# Patient Record
Sex: FEMALE | Race: WHITE | Employment: UNEMPLOYED | URBAN - METROPOLITAN AREA
[De-identification: names, ages, dates, MRNs, and addresses within clinical notes are randomized per-mention and may not be internally consistent; named-entity substitution may affect disease eponyms.]

---

## 2022-10-14 PROBLEM — E66.01 MORBID OBESITY (HCC): Status: ACTIVE | Noted: 2022-10-14

## 2022-10-14 PROBLEM — E11.9 DM (DIABETES MELLITUS) (HCC): Status: ACTIVE | Noted: 2022-10-14

## 2022-10-27 ENCOUNTER — HOSPITAL ENCOUNTER (INPATIENT)
Age: 52
LOS: 1 days | Discharge: HOME | DRG: 951 | End: 2022-10-29
Attending: EMERGENCY MEDICINE | Admitting: INTERNAL MEDICINE
Payer: MEDICAID

## 2022-10-27 DIAGNOSIS — E66.01 CLASS 3 SEVERE OBESITY WITH SERIOUS COMORBIDITY AND BODY MASS INDEX (BMI) OF 40.0 TO 44.9 IN ADULT, UNSPECIFIED OBESITY TYPE (HCC): ICD-10-CM

## 2022-10-27 DIAGNOSIS — L73.2 HIDRADENITIS SUPPURATIVA: Primary | ICD-10-CM

## 2022-10-27 DIAGNOSIS — L02.91 ABSCESS: ICD-10-CM

## 2022-10-27 DIAGNOSIS — L02.214 GROIN ABSCESS: ICD-10-CM

## 2022-10-27 DIAGNOSIS — E11.649 UNCONTROLLED TYPE 2 DIABETES MELLITUS WITH HYPOGLYCEMIA WITHOUT COMA (HCC): ICD-10-CM

## 2022-10-27 PROCEDURE — 99285 EMERGENCY DEPT VISIT HI MDM: CPT

## 2022-10-27 ASSESSMENT — PAIN DESCRIPTION - PAIN TYPE: TYPE: ACUTE PAIN

## 2022-10-27 ASSESSMENT — PAIN - FUNCTIONAL ASSESSMENT: PAIN_FUNCTIONAL_ASSESSMENT: 0-10

## 2022-10-27 ASSESSMENT — PAIN DESCRIPTION - FREQUENCY: FREQUENCY: CONTINUOUS

## 2022-10-27 ASSESSMENT — PAIN SCALES - GENERAL: PAINLEVEL_OUTOF10: 9

## 2022-10-27 ASSESSMENT — PAIN DESCRIPTION - DESCRIPTORS: DESCRIPTORS: SORE

## 2022-10-28 ENCOUNTER — ANESTHESIA EVENT (OUTPATIENT)
Dept: OPERATING ROOM | Age: 52
DRG: 951 | End: 2022-10-28
Payer: MEDICAID

## 2022-10-28 ENCOUNTER — APPOINTMENT (OUTPATIENT)
Dept: GENERAL RADIOLOGY | Age: 52
DRG: 951 | End: 2022-10-28
Payer: MEDICAID

## 2022-10-28 ENCOUNTER — ANESTHESIA (OUTPATIENT)
Dept: OPERATING ROOM | Age: 52
DRG: 951 | End: 2022-10-28
Payer: MEDICAID

## 2022-10-28 ENCOUNTER — APPOINTMENT (OUTPATIENT)
Dept: CT IMAGING | Age: 52
DRG: 951 | End: 2022-10-28
Payer: MEDICAID

## 2022-10-28 PROBLEM — D72.9 NEUTROPHILIC LEUKOCYTOSIS: Status: ACTIVE | Noted: 2022-10-28

## 2022-10-28 PROBLEM — L73.2 HIDRADENITIS SUPPURATIVA: Status: ACTIVE | Noted: 2022-10-28

## 2022-10-28 LAB
A/G RATIO: 1 (ref 1.1–2.2)
ALBUMIN SERPL-MCNC: 4 G/DL (ref 3.4–5)
ALP BLD-CCNC: 126 U/L (ref 40–129)
ALT SERPL-CCNC: 13 U/L (ref 10–40)
ANION GAP SERPL CALCULATED.3IONS-SCNC: 12 MMOL/L (ref 3–16)
AST SERPL-CCNC: 9 U/L (ref 15–37)
BASE EXCESS VENOUS: -0.5 MMOL/L (ref -3–3)
BASOPHILS ABSOLUTE: 0.1 K/UL (ref 0–0.2)
BASOPHILS RELATIVE PERCENT: 0.4 %
BETA-HYDROXYBUTYRATE: 0.08 MMOL/L (ref 0–0.27)
BILIRUB SERPL-MCNC: 0.4 MG/DL (ref 0–1)
BILIRUBIN URINE: NEGATIVE
BLOOD, URINE: NEGATIVE
BUN BLDV-MCNC: 10 MG/DL (ref 7–20)
CALCIUM SERPL-MCNC: 9.3 MG/DL (ref 8.3–10.6)
CARBOXYHEMOGLOBIN: 1.9 % (ref 0–1.5)
CHLORIDE BLD-SCNC: 97 MMOL/L (ref 99–110)
CLARITY: CLEAR
CO2: 23 MMOL/L (ref 21–32)
COLOR: YELLOW
CREAT SERPL-MCNC: 0.7 MG/DL (ref 0.6–1.1)
EKG ATRIAL RATE: 111 BPM
EKG DIAGNOSIS: NORMAL
EKG P AXIS: 50 DEGREES
EKG P-R INTERVAL: 128 MS
EKG Q-T INTERVAL: 334 MS
EKG QRS DURATION: 78 MS
EKG QTC CALCULATION (BAZETT): 454 MS
EKG R AXIS: -51 DEGREES
EKG T AXIS: 57 DEGREES
EKG VENTRICULAR RATE: 111 BPM
EOSINOPHILS ABSOLUTE: 0.1 K/UL (ref 0–0.6)
EOSINOPHILS RELATIVE PERCENT: 0.4 %
GFR SERPL CREATININE-BSD FRML MDRD: >60 ML/MIN/{1.73_M2}
GLUCOSE BLD-MCNC: 174 MG/DL (ref 70–99)
GLUCOSE BLD-MCNC: 241 MG/DL (ref 70–99)
GLUCOSE BLD-MCNC: 253 MG/DL (ref 70–99)
GLUCOSE BLD-MCNC: 265 MG/DL (ref 70–99)
GLUCOSE BLD-MCNC: 338 MG/DL (ref 70–99)
GLUCOSE URINE: >=1000 MG/DL
HCG(URINE) PREGNANCY TEST: NEGATIVE
HCO3 VENOUS: 23.7 MMOL/L (ref 23–29)
HCT VFR BLD CALC: 40.3 % (ref 36–48)
HEMOGLOBIN: 13.3 G/DL (ref 12–16)
KETONES, URINE: NEGATIVE MG/DL
LACTIC ACID: 1.7 MMOL/L (ref 0.4–2)
LEUKOCYTE ESTERASE, URINE: NEGATIVE
LIPASE: 25 U/L (ref 13–60)
LYMPHOCYTES ABSOLUTE: 2.4 K/UL (ref 1–5.1)
LYMPHOCYTES RELATIVE PERCENT: 18 %
MCH RBC QN AUTO: 26.5 PG (ref 26–34)
MCHC RBC AUTO-ENTMCNC: 33 G/DL (ref 31–36)
MCV RBC AUTO: 80.1 FL (ref 80–100)
METHEMOGLOBIN VENOUS: 0.3 %
MICROSCOPIC EXAMINATION: ABNORMAL
MONOCYTES ABSOLUTE: 1 K/UL (ref 0–1.3)
MONOCYTES RELATIVE PERCENT: 7.2 %
NEUTROPHILS ABSOLUTE: 9.8 K/UL (ref 1.7–7.7)
NEUTROPHILS RELATIVE PERCENT: 74 %
NITRITE, URINE: NEGATIVE
O2 SAT, VEN: 78 %
O2 THERAPY: ABNORMAL
PCO2, VEN: 37.5 MMHG (ref 40–50)
PDW BLD-RTO: 15 % (ref 12.4–15.4)
PERFORMED ON: ABNORMAL
PH UA: 6 (ref 5–8)
PH VENOUS: 7.42 (ref 7.35–7.45)
PLATELET # BLD: 387 K/UL (ref 135–450)
PMV BLD AUTO: 7.4 FL (ref 5–10.5)
PO2, VEN: 41.6 MMHG (ref 25–40)
POTASSIUM REFLEX MAGNESIUM: 3.8 MMOL/L (ref 3.5–5.1)
PROCALCITONIN: 0.07 NG/ML (ref 0–0.15)
PROTEIN UA: NEGATIVE MG/DL
RBC # BLD: 5.03 M/UL (ref 4–5.2)
SARS-COV-2, NAAT: NOT DETECTED
SODIUM BLD-SCNC: 132 MMOL/L (ref 136–145)
SPECIFIC GRAVITY UA: 1.01 (ref 1–1.03)
TCO2 CALC VENOUS: 25 MMOL/L
TOTAL PROTEIN: 7.9 G/DL (ref 6.4–8.2)
TROPONIN: <0.01 NG/ML
URINE REFLEX TO CULTURE: ABNORMAL
URINE TYPE: ABNORMAL
UROBILINOGEN, URINE: 0.2 E.U./DL
WBC # BLD: 13.3 K/UL (ref 4–11)

## 2022-10-28 PROCEDURE — 87076 CULTURE ANAEROBE IDENT EACH: CPT

## 2022-10-28 PROCEDURE — 6370000000 HC RX 637 (ALT 250 FOR IP): Performed by: INTERNAL MEDICINE

## 2022-10-28 PROCEDURE — 87040 BLOOD CULTURE FOR BACTERIA: CPT

## 2022-10-28 PROCEDURE — 70450 CT HEAD/BRAIN W/O DYE: CPT

## 2022-10-28 PROCEDURE — 87077 CULTURE AEROBIC IDENTIFY: CPT

## 2022-10-28 PROCEDURE — 6360000002 HC RX W HCPCS: Performed by: INTERNAL MEDICINE

## 2022-10-28 PROCEDURE — 1200000000 HC SEMI PRIVATE

## 2022-10-28 PROCEDURE — 87635 SARS-COV-2 COVID-19 AMP PRB: CPT

## 2022-10-28 PROCEDURE — 96365 THER/PROPH/DIAG IV INF INIT: CPT

## 2022-10-28 PROCEDURE — 2580000003 HC RX 258: Performed by: SURGERY

## 2022-10-28 PROCEDURE — 56405 I&D VULVA/PERINEAL ABSCESS: CPT | Performed by: SURGERY

## 2022-10-28 PROCEDURE — 2580000003 HC RX 258: Performed by: INTERNAL MEDICINE

## 2022-10-28 PROCEDURE — 3700000000 HC ANESTHESIA ATTENDED CARE: Performed by: SURGERY

## 2022-10-28 PROCEDURE — 87075 CULTR BACTERIA EXCEPT BLOOD: CPT

## 2022-10-28 PROCEDURE — 7100000000 HC PACU RECOVERY - FIRST 15 MIN: Performed by: SURGERY

## 2022-10-28 PROCEDURE — 84703 CHORIONIC GONADOTROPIN ASSAY: CPT

## 2022-10-28 PROCEDURE — 74176 CT ABD & PELVIS W/O CONTRAST: CPT

## 2022-10-28 PROCEDURE — 80053 COMPREHEN METABOLIC PANEL: CPT

## 2022-10-28 PROCEDURE — 2580000003 HC RX 258: Performed by: NURSE ANESTHETIST, CERTIFIED REGISTERED

## 2022-10-28 PROCEDURE — 87015 SPECIMEN INFECT AGNT CONCNTJ: CPT

## 2022-10-28 PROCEDURE — 2500000003 HC RX 250 WO HCPCS: Performed by: NURSE ANESTHETIST, CERTIFIED REGISTERED

## 2022-10-28 PROCEDURE — 96366 THER/PROPH/DIAG IV INF ADDON: CPT

## 2022-10-28 PROCEDURE — 2580000003 HC RX 258: Performed by: PHYSICIAN ASSISTANT

## 2022-10-28 PROCEDURE — 99223 1ST HOSP IP/OBS HIGH 75: CPT | Performed by: SURGERY

## 2022-10-28 PROCEDURE — 85025 COMPLETE CBC W/AUTO DIFF WBC: CPT

## 2022-10-28 PROCEDURE — 0J9M0ZZ DRAINAGE OF LEFT UPPER LEG SUBCUTANEOUS TISSUE AND FASCIA, OPEN APPROACH: ICD-10-PCS | Performed by: SURGERY

## 2022-10-28 PROCEDURE — A4217 STERILE WATER/SALINE, 500 ML: HCPCS | Performed by: SURGERY

## 2022-10-28 PROCEDURE — 3700000001 HC ADD 15 MINUTES (ANESTHESIA): Performed by: SURGERY

## 2022-10-28 PROCEDURE — 87205 SMEAR GRAM STAIN: CPT

## 2022-10-28 PROCEDURE — 71045 X-RAY EXAM CHEST 1 VIEW: CPT

## 2022-10-28 PROCEDURE — 84145 PROCALCITONIN (PCT): CPT

## 2022-10-28 PROCEDURE — 82803 BLOOD GASES ANY COMBINATION: CPT

## 2022-10-28 PROCEDURE — 81003 URINALYSIS AUTO W/O SCOPE: CPT

## 2022-10-28 PROCEDURE — 2500000003 HC RX 250 WO HCPCS: Performed by: INTERNAL MEDICINE

## 2022-10-28 PROCEDURE — 93005 ELECTROCARDIOGRAM TRACING: CPT | Performed by: PHYSICIAN ASSISTANT

## 2022-10-28 PROCEDURE — 87070 CULTURE OTHR SPECIMN AEROBIC: CPT

## 2022-10-28 PROCEDURE — 87102 FUNGUS ISOLATION CULTURE: CPT

## 2022-10-28 PROCEDURE — 3600000012 HC SURGERY LEVEL 2 ADDTL 15MIN: Performed by: SURGERY

## 2022-10-28 PROCEDURE — 6360000002 HC RX W HCPCS: Performed by: ANESTHESIOLOGY

## 2022-10-28 PROCEDURE — 2709999900 HC NON-CHARGEABLE SUPPLY: Performed by: SURGERY

## 2022-10-28 PROCEDURE — 87206 SMEAR FLUORESCENT/ACID STAI: CPT

## 2022-10-28 PROCEDURE — 83690 ASSAY OF LIPASE: CPT

## 2022-10-28 PROCEDURE — 2500000003 HC RX 250 WO HCPCS: Performed by: PHYSICIAN ASSISTANT

## 2022-10-28 PROCEDURE — 87116 MYCOBACTERIA CULTURE: CPT

## 2022-10-28 PROCEDURE — 83605 ASSAY OF LACTIC ACID: CPT

## 2022-10-28 PROCEDURE — 6360000002 HC RX W HCPCS: Performed by: NURSE ANESTHETIST, CERTIFIED REGISTERED

## 2022-10-28 PROCEDURE — 93010 ELECTROCARDIOGRAM REPORT: CPT | Performed by: INTERNAL MEDICINE

## 2022-10-28 PROCEDURE — 3600000002 HC SURGERY LEVEL 2 BASE: Performed by: SURGERY

## 2022-10-28 PROCEDURE — 82010 KETONE BODYS QUAN: CPT

## 2022-10-28 PROCEDURE — 84484 ASSAY OF TROPONIN QUANT: CPT

## 2022-10-28 PROCEDURE — 99221 1ST HOSP IP/OBS SF/LOW 40: CPT | Performed by: INTERNAL MEDICINE

## 2022-10-28 PROCEDURE — 7100000001 HC PACU RECOVERY - ADDTL 15 MIN: Performed by: SURGERY

## 2022-10-28 RX ORDER — SODIUM CHLORIDE 9 MG/ML
25 INJECTION, SOLUTION INTRAVENOUS PRN
Status: DISCONTINUED | OUTPATIENT
Start: 2022-10-28 | End: 2022-10-28 | Stop reason: HOSPADM

## 2022-10-28 RX ORDER — ATORVASTATIN CALCIUM 10 MG/1
10 TABLET, FILM COATED ORAL NIGHTLY
Status: DISCONTINUED | OUTPATIENT
Start: 2022-10-28 | End: 2022-10-29 | Stop reason: HOSPADM

## 2022-10-28 RX ORDER — SODIUM CHLORIDE, SODIUM LACTATE, POTASSIUM CHLORIDE, CALCIUM CHLORIDE 600; 310; 30; 20 MG/100ML; MG/100ML; MG/100ML; MG/100ML
INJECTION, SOLUTION INTRAVENOUS CONTINUOUS PRN
Status: DISCONTINUED | OUTPATIENT
Start: 2022-10-28 | End: 2022-10-30 | Stop reason: SDUPTHER

## 2022-10-28 RX ORDER — OXYCODONE HYDROCHLORIDE 5 MG/1
5 TABLET ORAL PRN
Status: DISCONTINUED | OUTPATIENT
Start: 2022-10-28 | End: 2022-10-28 | Stop reason: HOSPADM

## 2022-10-28 RX ORDER — ACETAMINOPHEN 650 MG/1
650 SUPPOSITORY RECTAL EVERY 4 HOURS PRN
Status: DISCONTINUED | OUTPATIENT
Start: 2022-10-28 | End: 2022-10-29 | Stop reason: HOSPADM

## 2022-10-28 RX ORDER — ONDANSETRON 2 MG/ML
INJECTION INTRAMUSCULAR; INTRAVENOUS PRN
Status: DISCONTINUED | OUTPATIENT
Start: 2022-10-28 | End: 2022-10-30 | Stop reason: SDUPTHER

## 2022-10-28 RX ORDER — SODIUM CHLORIDE 0.9 % (FLUSH) 0.9 %
5-40 SYRINGE (ML) INJECTION PRN
Status: DISCONTINUED | OUTPATIENT
Start: 2022-10-28 | End: 2022-10-28 | Stop reason: HOSPADM

## 2022-10-28 RX ORDER — SODIUM CHLORIDE 9 MG/ML
INJECTION, SOLUTION INTRAVENOUS PRN
Status: DISCONTINUED | OUTPATIENT
Start: 2022-10-28 | End: 2022-10-29 | Stop reason: HOSPADM

## 2022-10-28 RX ORDER — FENTANYL CITRATE 50 UG/ML
INJECTION, SOLUTION INTRAMUSCULAR; INTRAVENOUS PRN
Status: DISCONTINUED | OUTPATIENT
Start: 2022-10-28 | End: 2022-10-30 | Stop reason: SDUPTHER

## 2022-10-28 RX ORDER — SODIUM CHLORIDE 0.9 % (FLUSH) 0.9 %
5-40 SYRINGE (ML) INJECTION EVERY 12 HOURS SCHEDULED
Status: DISCONTINUED | OUTPATIENT
Start: 2022-10-28 | End: 2022-10-28 | Stop reason: HOSPADM

## 2022-10-28 RX ORDER — ONDANSETRON 2 MG/ML
4 INJECTION INTRAMUSCULAR; INTRAVENOUS
Status: DISCONTINUED | OUTPATIENT
Start: 2022-10-28 | End: 2022-10-28 | Stop reason: HOSPADM

## 2022-10-28 RX ORDER — MEPERIDINE HYDROCHLORIDE 50 MG/ML
12.5 INJECTION INTRAMUSCULAR; INTRAVENOUS; SUBCUTANEOUS EVERY 5 MIN PRN
Status: DISCONTINUED | OUTPATIENT
Start: 2022-10-28 | End: 2022-10-28 | Stop reason: HOSPADM

## 2022-10-28 RX ORDER — PROPOFOL 10 MG/ML
INJECTION, EMULSION INTRAVENOUS PRN
Status: DISCONTINUED | OUTPATIENT
Start: 2022-10-28 | End: 2022-10-30 | Stop reason: SDUPTHER

## 2022-10-28 RX ORDER — SODIUM CHLORIDE, SODIUM LACTATE, POTASSIUM CHLORIDE, AND CALCIUM CHLORIDE .6; .31; .03; .02 G/100ML; G/100ML; G/100ML; G/100ML
30 INJECTION, SOLUTION INTRAVENOUS ONCE
Status: COMPLETED | OUTPATIENT
Start: 2022-10-28 | End: 2022-10-28

## 2022-10-28 RX ORDER — CLINDAMYCIN PHOSPHATE 600 MG/50ML
600 INJECTION INTRAVENOUS EVERY 6 HOURS
Status: DISCONTINUED | OUTPATIENT
Start: 2022-10-28 | End: 2022-10-28

## 2022-10-28 RX ORDER — ACETAMINOPHEN 325 MG/1
650 TABLET ORAL EVERY 4 HOURS PRN
Status: DISCONTINUED | OUTPATIENT
Start: 2022-10-28 | End: 2022-10-29 | Stop reason: HOSPADM

## 2022-10-28 RX ORDER — ASPIRIN 81 MG/1
81 TABLET ORAL DAILY
Status: DISCONTINUED | OUTPATIENT
Start: 2022-10-28 | End: 2022-10-29 | Stop reason: HOSPADM

## 2022-10-28 RX ORDER — CEFTRIAXONE 1 G/1
INJECTION, POWDER, FOR SOLUTION INTRAMUSCULAR; INTRAVENOUS PRN
Status: DISCONTINUED | OUTPATIENT
Start: 2022-10-28 | End: 2022-10-30 | Stop reason: SDUPTHER

## 2022-10-28 RX ORDER — LABETALOL HYDROCHLORIDE 5 MG/ML
10 INJECTION, SOLUTION INTRAVENOUS
Status: DISCONTINUED | OUTPATIENT
Start: 2022-10-28 | End: 2022-10-28 | Stop reason: HOSPADM

## 2022-10-28 RX ORDER — POLYETHYLENE GLYCOL 3350 17 G/17G
17 POWDER, FOR SOLUTION ORAL DAILY PRN
Status: DISCONTINUED | OUTPATIENT
Start: 2022-10-28 | End: 2022-10-29 | Stop reason: HOSPADM

## 2022-10-28 RX ORDER — CLINDAMYCIN PHOSPHATE 600 MG/50ML
600 INJECTION INTRAVENOUS ONCE
Status: COMPLETED | OUTPATIENT
Start: 2022-10-28 | End: 2022-10-28

## 2022-10-28 RX ORDER — SODIUM CHLORIDE 0.9 % (FLUSH) 0.9 %
10 SYRINGE (ML) INJECTION PRN
Status: DISCONTINUED | OUTPATIENT
Start: 2022-10-28 | End: 2022-10-29 | Stop reason: HOSPADM

## 2022-10-28 RX ORDER — DIPHENHYDRAMINE HYDROCHLORIDE 50 MG/ML
12.5 INJECTION INTRAMUSCULAR; INTRAVENOUS
Status: DISCONTINUED | OUTPATIENT
Start: 2022-10-28 | End: 2022-10-28 | Stop reason: HOSPADM

## 2022-10-28 RX ORDER — LIDOCAINE HYDROCHLORIDE 20 MG/ML
INJECTION, SOLUTION INFILTRATION; PERINEURAL PRN
Status: DISCONTINUED | OUTPATIENT
Start: 2022-10-28 | End: 2022-10-30 | Stop reason: SDUPTHER

## 2022-10-28 RX ORDER — SODIUM CHLORIDE 0.9 % (FLUSH) 0.9 %
10 SYRINGE (ML) INJECTION EVERY 12 HOURS SCHEDULED
Status: DISCONTINUED | OUTPATIENT
Start: 2022-10-28 | End: 2022-10-29 | Stop reason: HOSPADM

## 2022-10-28 RX ORDER — INSULIN LISPRO 100 [IU]/ML
0-8 INJECTION, SOLUTION INTRAVENOUS; SUBCUTANEOUS
Status: DISCONTINUED | OUTPATIENT
Start: 2022-10-28 | End: 2022-10-29 | Stop reason: HOSPADM

## 2022-10-28 RX ORDER — ONDANSETRON 2 MG/ML
4 INJECTION INTRAMUSCULAR; INTRAVENOUS EVERY 4 HOURS PRN
Status: DISCONTINUED | OUTPATIENT
Start: 2022-10-28 | End: 2022-10-29 | Stop reason: HOSPADM

## 2022-10-28 RX ORDER — ENOXAPARIN SODIUM 100 MG/ML
40 INJECTION SUBCUTANEOUS DAILY
Status: DISCONTINUED | OUTPATIENT
Start: 2022-10-28 | End: 2022-10-29 | Stop reason: HOSPADM

## 2022-10-28 RX ORDER — MAGNESIUM HYDROXIDE 1200 MG/15ML
LIQUID ORAL CONTINUOUS PRN
Status: DISCONTINUED | OUTPATIENT
Start: 2022-10-28 | End: 2022-10-28 | Stop reason: HOSPADM

## 2022-10-28 RX ORDER — DEXTROSE MONOHYDRATE 100 MG/ML
INJECTION, SOLUTION INTRAVENOUS CONTINUOUS PRN
Status: DISCONTINUED | OUTPATIENT
Start: 2022-10-28 | End: 2022-10-29 | Stop reason: HOSPADM

## 2022-10-28 RX ORDER — INSULIN LISPRO 100 [IU]/ML
0-4 INJECTION, SOLUTION INTRAVENOUS; SUBCUTANEOUS NIGHTLY
Status: DISCONTINUED | OUTPATIENT
Start: 2022-10-28 | End: 2022-10-29 | Stop reason: HOSPADM

## 2022-10-28 RX ORDER — INSULIN GLARGINE 100 [IU]/ML
15 INJECTION, SOLUTION SUBCUTANEOUS 2 TIMES DAILY
Status: DISCONTINUED | OUTPATIENT
Start: 2022-10-28 | End: 2022-10-29 | Stop reason: HOSPADM

## 2022-10-28 RX ORDER — OXYCODONE HYDROCHLORIDE 5 MG/1
10 TABLET ORAL PRN
Status: DISCONTINUED | OUTPATIENT
Start: 2022-10-28 | End: 2022-10-28 | Stop reason: HOSPADM

## 2022-10-28 RX ADMIN — INSULIN GLARGINE 15 UNITS: 100 INJECTION, SOLUTION SUBCUTANEOUS at 08:56

## 2022-10-28 RX ADMIN — CLINDAMYCIN PHOSPHATE 600 MG: 600 INJECTION, SOLUTION INTRAVENOUS at 13:33

## 2022-10-28 RX ADMIN — CEFTRIAXONE SODIUM 2 G: 1 INJECTION, POWDER, FOR SOLUTION INTRAMUSCULAR; INTRAVENOUS at 16:49

## 2022-10-28 RX ADMIN — PROPOFOL 200 MG: 10 INJECTION, EMULSION INTRAVENOUS at 16:40

## 2022-10-28 RX ADMIN — PHENYLEPHRINE HYDROCHLORIDE 50 MCG: 10 INJECTION INTRAVENOUS at 16:50

## 2022-10-28 RX ADMIN — INSULIN LISPRO 4 UNITS: 100 INJECTION, SOLUTION INTRAVENOUS; SUBCUTANEOUS at 08:55

## 2022-10-28 RX ADMIN — MICONAZOLE NITRATE: 2 POWDER TOPICAL at 21:51

## 2022-10-28 RX ADMIN — CLINDAMYCIN PHOSPHATE 600 MG: 600 INJECTION, SOLUTION INTRAVENOUS at 08:54

## 2022-10-28 RX ADMIN — ACETAMINOPHEN 650 MG: 325 TABLET ORAL at 21:42

## 2022-10-28 RX ADMIN — PHENYLEPHRINE HYDROCHLORIDE 50 MCG: 10 INJECTION INTRAVENOUS at 16:47

## 2022-10-28 RX ADMIN — LIDOCAINE HYDROCHLORIDE 60 MG: 20 INJECTION, SOLUTION INFILTRATION; PERINEURAL at 16:40

## 2022-10-28 RX ADMIN — VANCOMYCIN HYDROCHLORIDE 1750 MG: 1 INJECTION, POWDER, LYOPHILIZED, FOR SOLUTION INTRAVENOUS at 16:45

## 2022-10-28 RX ADMIN — DEXTROSE MONOHYDRATE 2000 MG: 5 INJECTION INTRAVENOUS at 15:00

## 2022-10-28 RX ADMIN — Medication 10 ML: at 08:51

## 2022-10-28 RX ADMIN — SODIUM CHLORIDE, SODIUM LACTATE, POTASSIUM CHLORIDE, AND CALCIUM CHLORIDE: .6; .31; .03; .02 INJECTION, SOLUTION INTRAVENOUS at 16:27

## 2022-10-28 RX ADMIN — ONDANSETRON 4 MG: 2 INJECTION INTRAMUSCULAR; INTRAVENOUS at 16:50

## 2022-10-28 RX ADMIN — ATORVASTATIN CALCIUM 10 MG: 10 TABLET, FILM COATED ORAL at 21:42

## 2022-10-28 RX ADMIN — ASPIRIN 81 MG: 81 TABLET, COATED ORAL at 08:48

## 2022-10-28 RX ADMIN — Medication 10 ML: at 21:51

## 2022-10-28 RX ADMIN — SODIUM CHLORIDE, POTASSIUM CHLORIDE, SODIUM LACTATE AND CALCIUM CHLORIDE 1434 ML: 600; 310; 30; 20 INJECTION, SOLUTION INTRAVENOUS at 01:49

## 2022-10-28 RX ADMIN — INSULIN LISPRO 2 UNITS: 100 INJECTION, SOLUTION INTRAVENOUS; SUBCUTANEOUS at 18:59

## 2022-10-28 RX ADMIN — PHENYLEPHRINE HYDROCHLORIDE 50 MCG: 10 INJECTION INTRAVENOUS at 16:52

## 2022-10-28 RX ADMIN — INSULIN GLARGINE 15 UNITS: 100 INJECTION, SOLUTION SUBCUTANEOUS at 21:52

## 2022-10-28 RX ADMIN — Medication 10 ML: at 13:24

## 2022-10-28 RX ADMIN — MICONAZOLE NITRATE: 2 POWDER TOPICAL at 08:55

## 2022-10-28 RX ADMIN — CLINDAMYCIN PHOSPHATE 600 MG: 600 INJECTION, SOLUTION INTRAVENOUS at 01:49

## 2022-10-28 RX ADMIN — ENOXAPARIN SODIUM 40 MG: 100 INJECTION SUBCUTANEOUS at 08:49

## 2022-10-28 RX ADMIN — INSULIN LISPRO 4 UNITS: 100 INJECTION, SOLUTION INTRAVENOUS; SUBCUTANEOUS at 11:38

## 2022-10-28 RX ADMIN — FENTANYL CITRATE 50 MCG: 50 INJECTION INTRAMUSCULAR; INTRAVENOUS at 16:40

## 2022-10-28 RX ADMIN — ACETAMINOPHEN 650 MG: 325 TABLET ORAL at 08:48

## 2022-10-28 RX ADMIN — FENTANYL CITRATE 50 MCG: 50 INJECTION INTRAMUSCULAR; INTRAVENOUS at 16:47

## 2022-10-28 ASSESSMENT — ENCOUNTER SYMPTOMS
NAUSEA: 1
EYE REDNESS: 0
RHINORRHEA: 0
EYE DISCHARGE: 0
SINUS PRESSURE: 0
DIARRHEA: 0
CHEST TIGHTNESS: 0
SORE THROAT: 0
COUGH: 0
SHORTNESS OF BREATH: 0
SINUS PAIN: 0
CONSTIPATION: 0
VOMITING: 1
ABDOMINAL PAIN: 0

## 2022-10-28 ASSESSMENT — PAIN DESCRIPTION - LOCATION
LOCATION: VAGINA
LOCATION: LEG;GROIN
LOCATION: GROIN
LOCATION: GROIN;LEG
LOCATION: INCISION

## 2022-10-28 ASSESSMENT — PAIN DESCRIPTION - ORIENTATION
ORIENTATION: LEFT

## 2022-10-28 ASSESSMENT — PAIN DESCRIPTION - DESCRIPTORS
DESCRIPTORS: ACHING;DULL
DESCRIPTORS: ACHING;BURNING

## 2022-10-28 ASSESSMENT — PAIN DESCRIPTION - FREQUENCY: FREQUENCY: CONTINUOUS

## 2022-10-28 ASSESSMENT — PAIN SCALES - GENERAL
PAINLEVEL_OUTOF10: 7
PAINLEVEL_OUTOF10: 8
PAINLEVEL_OUTOF10: 5
PAINLEVEL_OUTOF10: 9
PAINLEVEL_OUTOF10: 5

## 2022-10-28 ASSESSMENT — PAIN - FUNCTIONAL ASSESSMENT
PAIN_FUNCTIONAL_ASSESSMENT: 0-10
PAIN_FUNCTIONAL_ASSESSMENT: PREVENTS OR INTERFERES SOME ACTIVE ACTIVITIES AND ADLS

## 2022-10-28 ASSESSMENT — PAIN DESCRIPTION - PAIN TYPE: TYPE: ACUTE PAIN

## 2022-10-28 NOTE — CONSULTS
Infectious Diseases   Consult Note      Reason for Consult:  ST abscess, concern for Mohamud    Requesting Physician:  Dr. Boris Ceballos      Date of Admission: 10/27/2022  Subjective:   CHIEF COMPLAINT:   none given       HPI:    Cabrera Parrish is a 48yoF with history of DM, HLD, TIA, s/p cholecystectomy, obesity                  ED 10/28/22 - 2d history of L groin pain and swelling, abscess noted. Low grade fever present   WBC was 13  CT showed ST inflammation. Was seen by Kellie Villar, plan for operative I&D noted    She is AF    BC collected, negative to date     She reports axillary boils associated with menstrual cycles. Otherwise without history of SSTI. No history of MRSA infection. She denies dysuria, abd pain, N/V/D       Current abx:  Clinda 600 q6       Past Surgical History:       Diagnosis Date    Diabetes mellitus ([address]     Headache     Hyperlipidemia     TIA (transient ischemic attack)     ,  per pt         Procedure Laterality Date     SECTION      CHOLECYSTECTOMY         Social History:    TOBACCO:   reports that she has never smoked. She has never used smokeless tobacco.  ETOH:   has no history on file for alcohol use. There is no history of illicit drug use or other significant epidemiologic exposures. Family History:   History reviewed. No pertinent family history. There is no family history of autoimmune diseases or significant infectious diseases.       Current Medications:    Current Facility-Administered Medications: insulin glargine (LANTUS) injection vial 15 Units, 15 Units, SubCUTAneous, BID  atorvastatin (LIPITOR) tablet 10 mg, 10 mg, Oral, Nightly  aspirin EC tablet 81 mg, 81 mg, Oral, Daily  glucose chewable tablet 16 g, 4 tablet, Oral, PRN  dextrose bolus 10% 125 mL, 125 mL, IntraVENous, PRN **OR** dextrose bolus 10% 250 mL, 250 mL, IntraVENous, PRN  glucagon (rDNA) injection 1 mg, 1 mg, SubCUTAneous, PRN  dextrose 10 % infusion, , IntraVENous, Continuous PRN  sodium chloride flush 0.9 % injection 10 mL, 10 mL, IntraVENous, 2 times per day  sodium chloride flush 0.9 % injection 10 mL, 10 mL, IntraVENous, PRN  0.9 % sodium chloride infusion, , IntraVENous, PRN  enoxaparin (LOVENOX) injection 40 mg, 40 mg, SubCUTAneous, Daily  ondansetron (ZOFRAN) injection 4 mg, 4 mg, IntraVENous, Q4H PRN  polyethylene glycol (GLYCOLAX) packet 17 g, 17 g, Oral, Daily PRN  acetaminophen (TYLENOL) tablet 650 mg, 650 mg, Oral, Q4H PRN **OR** acetaminophen (TYLENOL) suppository 650 mg, 650 mg, Rectal, Q4H PRN  insulin lispro (HUMALOG) injection vial 0-8 Units, 0-8 Units, SubCUTAneous, TID WC  insulin lispro (HUMALOG) injection vial 0-4 Units, 0-4 Units, SubCUTAneous, Nightly  clindamycin (CLEOCIN) 600 mg in dextrose 5 % 50 mL IVPB, 600 mg, IntraVENous, Q6H  miconazole (MICOTIN) 2 % powder, , Topical, BID      Allergies   Allergen Reactions    Iv Contrast [Iodides]         REVIEW OF SYSTEMS:    CONSTITUTIONAL:   per HPI    EYES:  negative for blurred vision, eye discharge, visual disturbance and icterus  HEENT:  negative for hearing loss, tinnitus, ear drainage, sinus pressure, nasal congestion, epistaxis and snoring  RESPIRATORY:  No cough, shortness of breath, hemoptysis  CARDIOVASCULAR:  negative for chest pain, palpitations, exertional chest pressure/discomfort, edema, syncope  GASTROINTESTINAL:  negative for nausea, vomiting, diarrhea, constipation, blood in stool and abdominal pain  GENITOURINARY:  negative for frequency, dysuria, urinary incontinence, decreased urine volume, and hematuria  HEMATOLOGIC/LYMPHATIC:  negative for easy bruising, bleeding and lymphadenopathy  ALLERGIC/IMMUNOLOGIC:  negative for recurrent infections, angioedema, anaphylaxis and drug reactions  ENDOCRINE:  negative for weight changes and diabetic symptoms including polyuria, polydipsia and polyphagia  MUSCULOSKELETAL:  negative for acute joint swelling, decreased range of motion and muscle weakness  NEUROLOGICAL:  negative for headaches, slurred speech, unilateral weakness  PSYCHIATRIC/BEHAVIORAL: negative for hallucinations, behavioral problems, confusion and agitation. Objective:   PHYSICAL EXAM:      VITALS:  BP (!) 93/59   Pulse 97   Temp 98.1 °F (36.7 °C) (Oral)   Resp 16   Ht 5' 1\" (1.549 m)   Wt 242 lb 1 oz (109.8 kg)   LMP 09/27/2022   SpO2 94%   BMI 45.74 kg/m²      24HR INTAKE/OUTPUT:    Intake/Output Summary (Last 24 hours) at 10/28/2022 1257  Last data filed at 10/28/2022 0845  Gross per 24 hour   Intake 240 ml   Output --   Net 240 ml     CONSTITUTIONAL:  Awake, alert, cooperative, no apparent distress, and appears stated age  Obese  HEENT: NCAT, PERRL, EOMI. Sclera white, conjunctiva full. OP with moist mucosal membranes, no thrush, tongue protrudes midline  NECK:  Supple, symmetrical, trachea midline, no adenopathy  LUNGS:  no increased work of breathing   ABDOMEN:  normal bowel sounds, soft, NT   L inner thigh with tender fluctuant abscess  PSYCHIATRIC: Oriented to person place and time. No obvious depression or anxiety. MUSCULOSKELETAL: No obvious misalignment or effusion of the joints. No clubbing, cyanosis of the digits. NEUROLOGIC: nonfocal exam  ACCESS:   hospitals site ok       DATA:    Old records have been reviewed    CBC:  Recent Labs     10/28/22  0126   WBC 13.3*   RBC 5.03   HGB 13.3   HCT 40.3      MCV 80.1   MCH 26.5   MCHC 33.0   RDW 15.0      BMP:  Recent Labs     10/28/22  0126   *   K 3.8   CL 97*   CO2 23   BUN 10   CREATININE 0.7   CALCIUM 9.3   GLUCOSE 338*        Cultures:   10/28 [facility] x2 NGTD       Radiology Review:  All pertinent images / reports were reviewed as a part of this visit. CT abd/pelvis   1. Fat stranding and inflammation centered in the medial upper left thigh   bordering the perineum. There is minimal stranding in the labia and left   inguinal canal but not the primary site.   There may be a small phlegmon just   under the skin in the medial left upper thigh. There is no abscess at the   labia or inguinal canal.       2.  No fat stranding or inflammation within the internal abdomen, pelvis, and   retroperitoneum. 3.  Fat containing ventral upper abdominal hernia and left lower abdominal   hernia without acute complication. 4.  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 Assessment:     Patient Active Problem List   Diagnosis    TIA (transient ischemic attack)    Morbid obesity due to excess calories (HCC)    DMII (diabetes mellitus, type 2) (HCC)    Abscess of left thigh    Sepsis ([address]    Tachycardia    Tachypnea    Neutrophilic leukocytosis    Hidradenitis suppurativa       Skin and soft tissue abscess, L thigh/groin   Plan for I&D  May be MRSA though she has no history of MRSA   No evidence of necrotizing infection       Recs:  Pending I&D and cultures, change to vanc and rocephin   Pharmacy to dose the vanc   Po abx based on culture at MD   MRSA nasal screen        Néstor Gibbs M.D. Thank you for the opportunity to participate in the care of your patient.     Please do not hesitate to contact me:   [phone] office

## 2022-10-28 NOTE — ED PROVIDER NOTES
I independently examined and evaluated Linda Reddy. All diagnostic, treatment, and disposition decisions were made by myself in conjunction with the [employer]. For all further details of the patient's emergency department visit, please see their documentation. 70-year-old female with a history of poorly controlled diabetes presents with a painful area on her left inner thigh. She states she squeezed it and it has been draining some malodorous fluid. She states the pain is worsening. Her blood sugars are not controlled at home. She apparently had a syncopal event in her kitchen this morning as well. She is unsure if she hit her head. Vitals:    10/28/22 0530   BP: 118/65   Pulse: (!) 106   Resp: 18   Temp: 98.1 °F (36.7 °C)   SpO2: 94%   Here she is morbidly obese. She has an area of induration with very minimal central fluctuance in the left inner thigh. This does not involve the labia. It is spontaneously draining purulent material. she is tachycardic, no respiratory distress.     Results for orders placed or performed during the hospital encounter of 10/27/22   COVID-19, Rapid    Specimen: Nasopharyngeal Swab   Result Value Ref Range    SARS-CoV-2, NAAT Not Detected Not Detected   CBC with Auto Differential   Result Value Ref Range    WBC 13.3 (H) 4.0 - 11.0 K/uL    RBC 5.03 4.00 - 5.20 M/uL    Hemoglobin 13.3 12.0 - 16.0 g/dL    Hematocrit 40.3 36.0 - 48.0 %    MCV 80.1 80.0 - 100.0 fL    MCH 26.5 26.0 - 34.0 pg    MCHC 33.0 31.0 - 36.0 g/dL    RDW 15.0 12.4 - 15.4 %    Platelets 347 861 - 612 K/uL    MPV 7.4 5.0 - 10.5 fL    Neutrophils % 74.0 %    Lymphocytes % 18.0 %    Monocytes % 7.2 %    Eosinophils % 0.4 %    Basophils % 0.4 %    Neutrophils Absolute 9.8 (H) 1.7 - 7.7 K/uL    Lymphocytes Absolute 2.4 1.0 - 5.1 K/uL    Monocytes Absolute 1.0 0.0 - 1.3 K/uL    Eosinophils Absolute 0.1 0.0 - 0.6 K/uL    Basophils Absolute 0.1 0.0 - 0.2 K/uL   CMP w/ Reflex to MG   Result Value Ref Range    Sodium 132 (L) 136 - 145 mmol/L    Potassium reflex Magnesium 3.8 3.5 - 5.1 mmol/L    Chloride 97 (L) 99 - 110 mmol/L    CO2 23 21 - 32 mmol/L    Anion Gap 12 3 - 16    Glucose 338 (H) 70 - 99 mg/dL    BUN 10 7 - 20 mg/dL    Creatinine 0.7 0.6 - 1.1 mg/dL    Est, Glom Filt Rate >60 >60    Calcium 9.3 8.3 - 10.6 mg/dL    Total Protein 7.9 6.4 - 8.2 g/dL    Albumin 4.0 3.4 - 5.0 g/dL    Albumin/Globulin Ratio 1.0 (L) 1.1 - 2.2    Total Bilirubin 0.4 0.0 - 1.0 mg/dL    Alkaline Phosphatase 126 40 - 129 U/L    ALT 13 10 - 40 U/L    AST 9 (L) 15 - 37 U/L   Procalcitonin   Result Value Ref Range    Procalcitonin 0.07 0.00 - 0.15 ng/mL   Troponin   Result Value Ref Range    Troponin <0.01 <0.01 ng/mL   Lipase   Result Value Ref Range    Lipase 25.0 13.0 - 60.0 U/L   Blood gas, venous   Result Value Ref Range    pH, Wilfrido 7.418 7.350 - 7.450    pCO2, Wilfrido 37.5 (L) 40.0 - 50.0 mmHg    pO2, Wilfrido 41.6 (H) 25.0 - 40.0 mmHg    HCO3, Venous 23.7 23.0 - 29.0 mmol/L    Base Excess, Wilfrido -0.5 -3.0 - 3.0 mmol/L    O2 Sat, Wilfrido 78 Not Established %    Carboxyhemoglobin 1.9 (H) 0.0 - 1.5 %    MetHgb, Wilfrido 0.3 <1.5 %    TC02 (Calc), Wilfrido 25 Not Established mmol/L    O2 Therapy Unknown    Urinalysis with Reflex to Culture    Specimen: Urine   Result Value Ref Range    Color, UA Yellow Straw/Yellow    Clarity, UA Clear Clear    Glucose, Ur >=1000 (A) Negative mg/dL    Bilirubin Urine Negative Negative    Ketones, Urine Negative Negative mg/dL    Specific Gravity, UA 1.015 1.005 - 1.030    Blood, Urine Negative Negative    pH, UA 6.0 5.0 - 8.0    Protein, UA Negative Negative mg/dL    Urobilinogen, Urine 0.2 <2.0 E.U./dL    Nitrite, Urine Negative Negative    Leukocyte Esterase, Urine Negative Negative    Microscopic Examination Not Indicated     Urine Type NotGiven     Urine Reflex to Culture Not Indicated    Pregnancy, Urine   Result Value Ref Range    HCG(Urine) Pregnancy Test Negative Detects HCG level >20 MIU/mL   Lactic Acid Result Value Ref Range    Lactic Acid 1.7 0.4 - 2.0 mmol/L   EKG 12 Lead   Result Value Ref Range    Ventricular Rate 111 BPM    Atrial Rate 111 BPM    P-R Interval 128 ms    QRS Duration 78 ms    Q-T Interval 334 ms    QTc Calculation (Bazett) 454 ms    P Axis 50 degrees    R Axis -51 degrees    T Axis 57 degrees    Diagnosis       Sinus tachycardiaLeft anterior fascicular blockCannot rule out Anterior infarct , age undeterminedAbnormal ECGWhen compared with ECG of 14-OCT-2022 05:56,Vent. rate has increased BY  46 BPMT wave inversion no longer evident in Inferior leadsT wave inversion no longer evident in Anterior leads         CT ABDOMEN PELVIS WO CONTRAST   Final Result   1. Fat stranding and inflammation centered in the medial upper left thigh   bordering the perineum. There is minimal stranding in the labia and left   inguinal canal but not the primary site. There may be a small phlegmon just   under the skin in the medial left upper thigh. There is no abscess at the   labia or inguinal canal.      2.  No fat stranding or inflammation within the internal abdomen, pelvis, and   retroperitoneum. 3.  Fat containing ventral upper abdominal hernia and left lower abdominal   hernia without acute complication. 4.  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 CT HEAD WO CONTRAST   Final Result   No acute intracranial hemorrhage, mass effect, midline shift, or   hydrocephalus. Stable appearance of the brain from recent prior exam..         XR CHEST PORTABLE   Final Result   No acute cardiopulmonary disease. EKG  The Ekg interpreted by myself  sinus tachycardia, unsc=857  with a rate of 111  Axis is   Normal  QTc is  normal  Left anterior fascicular block  No specific ST-T wave changes appreciated. No evidence of acute ischemia. When compared to the EKG from October 14, 2022, sinus tachycardia has replaced normal sinus rhythm.         I personally saw and performed a substantive portion of the visit including all aspects of the medical decision making. MDM:        Here the patient appears to have cellulitis with possible mild central abscess in the left inner thigh. She is an obese poorly controlled diabetic. Lab work does show leukocytosis but lactic acid is normal.  CT shows fat stranding and inflammation in the medial left upper thigh consistent with her exam.  We have given her IV antibiotics here. She does meet SIRS criteria and we have given IV fluids as well. We will admit her to the hospitalist for further treatment. No evidence of Mohamud's gangrene at this time but she is certainly at risk for it.         Kat Bond MD  10/30/22 [id number]

## 2022-10-28 NOTE — CARE COORDINATION
CASE MANAGEMENT INITIAL ASSESSMENT      Reviewed chart and completed assessment with patient:bedside  Family present: none  Explained Case Management role/services. Primary contact information:Chloé    Health Care Decision Maker :   Primary Decision Maker: chloé chavez - Spouse - [phone]          Can this person be reached and be able to respond quickly, such as within a few minutes or hours? Yes    Admit date/status:10/28/22  Diagnosis:abscess left thigh   Is this a Readmission?:  No      Insurance:medicaid   Precert required for SNF: Yes       3 night stay required: No    Living arrangements, Adls, care needs, prior to admission:lives in apartment with disabled . 0STE    Durable Medical Equipment at home:  Walker__Cane__RTS__ BSC__Shower Chair__  02__ HHN__ CPAP__  BiPap__  Hospital Bed__ W/C___ Other_____    Services in the home and/or outpatient, prior to admission:none    Current PCP:none, provided PCP list. And care clinic information. Medications Prescription coverage? yes    Transportation needs: will need cab. PT/OT recs:none    Hospital Exemption Notification (HEN):needed for SNF    Barriers to discharge:none    Plan/comments:spoke with patient. Plans on returning home at discharge. IPTA, uses no DME. Will need cab home.  Natan Hunt RN      ECOC on chart for MD signature

## 2022-10-28 NOTE — PROGRESS NOTES
Patients IV infiltrated prior to transport arriving. Several attempts at placing a new one. Anesthesia  Was able to place ultrasound guided 20 to the RAC. Patient tolerated well.

## 2022-10-28 NOTE — CONSULTS
Consult placed via [employer] to ID    Who: Dr. Seema Bueno  Date:10/28/2022,  Time:8:48 AM        Electronically signed by Praveen Knight on 10/28/2022 at 8:48 AM

## 2022-10-28 NOTE — ED PROVIDER NOTES
[facility] C3 TELE/MED SURG/ONC  EMERGENCY DEPARTMENT ENCOUNTER        Pt Name: Ghanshyam Velasquez  MRN: [id number]  [place] 1970  Date of evaluation: 10/27/2022  Provider: Kamaljit Bingham PA-C  PCP: No primary care provider on file. ED Attending: Stacie Gunn      This patient was seen and evaluated by the attending physician   I have not independently evaluated this patient. CHIEF COMPLAINT       Chief Complaint   Patient presents with    Other     Patient reports \"I have a red boil the size of an egg on my vagina/ L inner thigh\" noticed it 2 days ago. Abscess     Large abcess on the left inner thigh       HISTORY OF PRESENT ILLNESS   (Location/Symptom, Timing/Onset, Context/Setting, Quality, Duration, Modifying Factors, Severity)  Note limiting factors. Ghanshyam Velasquez is a 50 y.o. female with a past medical history of DM 2 uncontrolled, morbid obesity, TIA Lashae Saas via private vehicle for evaluation of a 1 week history of pain and swelling to her left groin area secondary to what she feels like is a boil. Patient advised she squeezed it without any improvement in the wound. Patient is uncertain if it is drained from the area. Patient advised that she has uncontrolled diabetes she is not on insulin although it is prescribed. She is at there were some confusion regarding the prescribing of it when she was discharged from the hospital the last time. Patient advised she has not established with a primary care provider at this time. Patient advised that her symptoms are associated with heart palpitations nausea and vomiting. She also experienced an episode of syncope where she passed out in her kitchen today. Nursing Notes were all reviewed and agreed with or any disagreements were addressed  in the HPI. REVIEW OF SYSTEMS  (2-9 systems for level 4, 10 or more for level 5)     Review of Systems   Constitutional:  Negative for chills and fever. HENT: Negative.   Negative for congestion, rhinorrhea, sinus pressure, sinus pain and sore throat. Eyes:  Negative for discharge, redness and visual disturbance. Respiratory:  Negative for cough, chest tightness and shortness of breath. Cardiovascular:  Positive for palpitations. Negative for chest pain. Gastrointestinal:  Positive for nausea and vomiting. Negative for abdominal pain, constipation and diarrhea. Genitourinary:  Negative for difficulty urinating, dysuria and frequency. Musculoskeletal: Negative. Skin:  Positive for wound. Neurological:  Positive for syncope. Negative for dizziness, weakness, numbness and headaches. Psychiatric/Behavioral: Negative. All other systems reviewed and are negative. Positivesand Pertinent negatives as per HPI. Except as noted above in the ROS, all other systems were reviewed and negative. PAST MEDICAL HISTORY     Past Medical History:   Diagnosis Date    Diabetes mellitus ([facility] [address]     Headache     Hyperlipidemia     TIA (transient ischemic attack)     ,  per pt         SURGICAL HISTORY       Past Surgical History:   Procedure Laterality Date     SECTION      CHOLECYSTECTOMY           CURRENT MEDICATIONS       Current Discharge Medication List        CONTINUE these medications which have NOT CHANGED    Details   atorvastatin (LIPITOR) 10 MG tablet Take 10 mg by mouth nightly      cyclobenzaprine (FLEXERIL) 10 MG tablet Take 10 mg by mouth at bedtime      aspirin 81 MG EC tablet Take 1 tablet by mouth daily  Qty: 30 tablet, Refills: 3      insulin glargine (LANTUS) 100 UNIT/ML injection vial Inject 15 Units into the skin 2 times daily  Qty: 10 mL, Refills: 0      insulin regular (HUMULIN R;NOVOLIN R) 100 UNIT/ML injection Sliding scale, use as previously instructed  Qty: 10 mL, Refills: 0               ALLERGIES     Iv contrast [iodides]    FAMILY HISTORY     History reviewed. No pertinent family history.       SOCIAL HISTORY       Social History     Socioeconomic History Findings: Abscess present. Neurological:      General: No focal deficit present. Mental Status: She is alert. Psychiatric:         Mood and Affect: Mood normal.         Behavior: Behavior normal.       DIAGNOSTIC RESULTS   LABS:    Labs Reviewed   CBC WITH AUTO DIFFERENTIAL - Abnormal; Notable for the following components:       Result Value    WBC 13.3 (*)     Neutrophils Absolute 9.8 (*)     All other components within normal limits   COMPREHENSIVE METABOLIC PANEL W/ REFLEX TO MG FOR LOW K - Abnormal; Notable for the following components:    Sodium 132 (*)     Chloride 97 (*)     Glucose 338 (*)     Albumin/Globulin Ratio 1.0 (*)     AST 9 (*)     All other components within normal limits   BLOOD GAS, VENOUS - Abnormal; Notable for the following components:    pCO2, [place] 37.5 (*)     pO2, [place] 41.6 (*)     Carboxyhemoglobin 1.9 (*)     All other components within normal limits   URINALYSIS WITH REFLEX TO CULTURE - Abnormal; Notable for the following components:    Glucose, Ur >=1000 (*)     All other components within normal limits   POCT GLUCOSE - Abnormal; Notable for the following components:    POC Glucose 253 (*)     All other components within normal limits   POCT GLUCOSE - Abnormal; Notable for the following components:    POC Glucose 265 (*)     All other components within normal limits   POCT GLUCOSE - Abnormal; Notable for the following components:    POC Glucose 241 (*)     All other components within normal limits   POCT GLUCOSE - Abnormal; Notable for the following components:    POC Glucose 174 (*)     All other components within normal limits   COVID-19, RAPID   CULTURE, BLOOD 1    Narrative:     ORDER#: [id number]                          ORDERED BY: AMBER RESTREPO  SOURCE: Blood                              COLLECTED:  10/28/22 01:46  ANTIBIOTICS AT JOSIE.:                      RECEIVED :  10/28/22 07:57  If child <=2 yrs old please draw pediatric bottle. ~Blood Culture 1   CULTURE, BLOOD 2    Narrative:     ORDER#: [id number]                          ORDERED BY: AMBER RESTREPO  SOURCE: Blood                              COLLECTED:  10/28/22 01:26  ANTIBIOTICS AT JOSIE.:                      RECEIVED :  10/28/22 07:57  If child <=2 yrs old please draw pediatric bottle. ~Blood Culture #2   CULTURE, SURGICAL    Narrative:     ORDER#: [id number]                          ORDERED BY: WOLFGANG STYLES  SOURCE: Groin                              COLLECTED:  10/28/22 16:51  ANTIBIOTICS AT JOSIE.:                      RECEIVED :  10/28/22 16:57   MRSA DNA PROBE, NASAL   CULTURE, FUNGUS   CULTURE WITH SMEAR, ACID FAST BACILLIUS   PROCALCITONIN   TROPONIN   LIPASE   BETA-HYDROXYBUTYRATE   PREGNANCY, URINE   LACTIC ACID   BASIC METABOLIC PANEL W/ REFLEX TO MG FOR LOW K   CBC WITH AUTO DIFFERENTIAL   POCT GLUCOSE   POCT GLUCOSE   POCT GLUCOSE   POCT GLUCOSE   POCT GLUCOSE   POCT GLUCOSE       All other labs were within normal range or notreturned as of this dictation. EKG: All EKG's are interpreted by the Emergency Department Physician who either signs or Co-signs this chart in the absence of a cardiologist.  Please see their note for interpretation of EKG. RADIOLOGY:     Interpretation per the Radiologist below, if available at the time of this note:    CT ABDOMEN PELVIS WO CONTRAST   Final Result   1. Fat stranding and inflammation centered in the medial upper left thigh   bordering the perineum. There is minimal stranding in the labia and left   inguinal canal but not the primary site. There may be a small phlegmon just   under the skin in the medial left upper thigh. There is no abscess at the   labia or inguinal canal.      2.  No fat stranding or inflammation within the internal abdomen, pelvis, and   retroperitoneum. 3.  Fat containing ventral upper abdominal hernia and left lower abdominal   hernia without acute complication. 4.  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 CT HEAD WO CONTRAST   Final Result   No acute intracranial hemorrhage, mass effect, midline shift, or   hydrocephalus. Stable appearance of the brain from recent prior exam..         XR CHEST PORTABLE   Final Result   No acute cardiopulmonary disease. XR CHEST PORTABLE    Result Date: 10/28/2022  EXAMINATION: ONE XRAY VIEW OF THE CHEST 10/28/2022 12:37 am COMPARISON: 10/13/2022 HISTORY: ORDERING SYSTEM PROVIDED HISTORY: Sepsis TECHNOLOGIST PROVIDED HISTORY: Reason for exam:->Sepsis Reason for Exam: SEPSIS FINDINGS: Portable study is limited by body habitus. Portable study was obtained at low lung volumes with crowding of lung markings at the bases. No acute airspace disease, pneumothorax or pleural effusion. Heart size and configuration are normal.  No acute bone finding. No acute cardiopulmonary disease. PROCEDURES        Procedures    CRITICAL CARE TIME   N/A    Is this patient to be included in the SEP-1 Core Measure due to severe sepsis or septic shock?    No   Exclusion criteria - the patient is NOT to be included for SEP-1 Core Measure due to:  2+ SIRS criteria are not met     CONSULTS:        EMERGENCY DEPARTMENT COURSE and DIFFERENTIAL DIAGNOSIS/MDM:   Vitals:    Vitals:    10/28/22 1845 10/28/22 2132 10/28/22 2135 10/29/22 0004   BP: 117/78 106/70  98/62   Pulse: 79 77 80 89   Resp: 16 18  18   Temp: 98.1 °F (36.7 °C) 98.3 °F (36.8 °C)  98.2 °F (36.8 °C)   TempSrc: Oral Oral  Oral   SpO2: 98% 97%  95%   Weight:       Height:           Patient was given the following medications:  Medications   insulin glargine (LANTUS) injection vial 15 Units (15 Units SubCUTAneous Given 10/28/22 2152)   atorvastatin (LIPITOR) tablet 10 mg (10 mg Oral Given 10/28/22 2142)   aspirin EC tablet 81 mg (81 mg Oral Given 10/28/22 0848)   glucose chewable tablet 16 g (has no administration in time range)   dextrose bolus 10% 125 mL (has no administration in time range)     Or   dextrose bolus 10% 250 mL (has no administration in time range)   glucagon (rDNA) injection 1 mg (has no administration in time range)   dextrose 10 % infusion (has no administration in time range)   sodium chloride flush 0.9 % injection 10 mL (10 mLs IntraVENous Given 10/28/22 2151)   sodium chloride flush 0.9 % injection 10 mL (10 mLs IntraVENous Given 10/28/22 1324)   0.9 % sodium chloride infusion (has no administration in time range)   enoxaparin (LOVENOX) injection 40 mg (40 mg SubCUTAneous Given 10/28/22 0849)   ondansetron (ZOFRAN) injection 4 mg (has no administration in time range)   polyethylene glycol (GLYCOLAX) packet 17 g (has no administration in time range)   acetaminophen (TYLENOL) tablet 650 mg (650 mg Oral Given 10/28/22 2142)     Or   acetaminophen (TYLENOL) suppository 650 mg ( Rectal See Alternative 10/28/22 2142)   insulin lispro (HUMALOG) injection vial 0-8 Units (2 Units SubCUTAneous Given 10/28/22 1859)   insulin lispro (HUMALOG) injection vial 0-4 Units (0 Units SubCUTAneous Not Given 10/28/22 2151)   miconazole (MICOTIN) 2 % powder ( Topical Given 10/28/22 2151)   cefTRIAXone (ROCEPHIN) 2,000 mg in dextrose 5 % 50 mL IVPB mini-bag (0 mg IntraVENous Stopped 10/28/22 1530)   vancomycin 1000 mg IVPB in 250 mL D5W addavial (has no administration in time range)   oxyCODONE (ROXICODONE) immediate release tablet 5 mg (has no administration in time range)     Or   oxyCODONE (ROXICODONE) immediate release tablet 10 mg (has no administration in time range)   HYDROmorphone (DILAUDID) injection 0.25 mg (has no administration in time range)     Or   HYDROmorphone (DILAUDID) injection 0.5 mg (has no administration in time range)   acetaminophen (TYLENOL) tablet 1,000 mg (1,000 mg Oral Given 10/29/22 0325)   lactated ringers bolus (0 mLs IntraVENous Stopped 10/28/22 0416)   clindamycin (CLEOCIN) 600 mg in dextrose 5 % 50 mL IVPB (0 mg IntraVENous Stopped 10/28/22 0416)   vancomycin (VANCOCIN) 1,750 mg in dextrose 5 % 500 mL IVPB (1,750 mg IntraVENous New Bag 10/28/22 3667)         Afebrile, stable, patient presents to the ED for evaluation. Nontoxic patient heart rate of 105 temperature of 99.4 on initial assessment. Patient with his wound to her left inguinal region with no active drainage. We discussed incision and drainage however it does appear to be starting to just with tunneling. As patient advised that she is noncompliant with taking care of her diabetes at all. I feel that probably getting imaging to evaluate the extent of this wound is warranted. Patient also has numerous other vague complaints in addition to this wound to her left inguinal region. It is actually on her medial aspect of her left leg  It appears to be hidradentitis supprativa,  with a abscess formation. I initiated antibiotic treatment in addition to IV fluids. Attempted to order a CT of the abdomen and pelvis with IV contrast however patient advised that she has anaphylaxis from receiving IV contrast.  Discussed the risk versus the benefit. I guess we will obtain a CT Noncon and see what we can see on it. Also will evaluate labs to rule out that she is septic or that she is in DKA. Patient has a normal neurological examination on my assessment. No evidence of trauma. Patient is signed out in stable condition to attending ED provider awaiting labs and imaging results. All questions are answered. The patient tolerated their visit well. They were seen and evaluated by the attendingphysician, No att. providers found who agreed with the assessment and plan. The patient and / or the family were informed of the results of any tests, a time was given to answer questions, a plan was proposed and they agreed Lyn Vincent. FINAL IMPRESSION      1. Hidradenitis suppurativa    2. Abscess    3. Class 3 severe obesity with serious comorbidity and body mass index (BMI) of 40.0 to 44.9 in adult, unspecified obesity type ([address]    4.  Uncontrolled type 2 diabetes mellitus with hypoglycemia without coma [facility])          DISPOSITION/PLAN   DISPOSITION Admitted 10/28/2022 04:31:41 AM      PATIENT REFERRED TO:  Afshan Calvo  [address] [place] [address] , [facility]   [phone]      to follow up on abscess    Arleen Covarrubias DO  [address] [phone]      to establish a PCP    DISCHARGE MEDICATIONS:  Current Discharge Medication List          DISCONTINUED MEDICATIONS:  Current Discharge Medication List                 Pt was seen during the [place] 19 pandemic. Appropriate PPE worn by ME during patient encounters. Pt seen during a time with constrained hospital bed capacity and other potential inpatient and outpatient resources were constrained due to the viral pandemic.    Please note that portions of this note were completed with a voice recognition program.  Efforts were made to edit the dictations but occasionally words are mis-transcribed.)    Siddharth Thacker PA-C (electronically signed)       Siddharth Thacker PA-C  10/29/22 7034

## 2022-10-28 NOTE — PROGRESS NOTES
Patient returned to floor from PACU, alert and oriented, VSS, evening insulin administered per order. Patient denies any pain at this time. Resting quietly in bed on her phone. Safety maintained, call light within reach and denies any needs at this time.

## 2022-10-28 NOTE — H&P
Hospital Medicine History & Physical      PCP: No primary care provider on file. Date of Admission: 10/27/2022    Date of Service: Pt seen/examined on 10/28/2022 and Admitted to Inpatient with expected LOS greater than two midnights due to medical therapy. Chief Complaint:      Left leg swelling and pain    History Of Present Illness:   50 y.o. female who presented to [facility] with left leg/groin pain. Patient also with increased swelling. Patient states she had a boil that got progressively worse. Patient with increasing pain and swelling over 2 days. Patient states she has had some boils in her axilla during her periods but they have never progressed beyond papules. Patient states her left groin is painful with leg movement. Patient with history of diabetes. No previous skin infections. Past Medical History:          Diagnosis Date    Diabetes mellitus ([address]     Headache     Hyperlipidemia     TIA (transient ischemic attack)     ,  per pt       Past Surgical History:          Procedure Laterality Date     SECTION      CHOLECYSTECTOMY         Medications Prior to Admission:      Prior to Admission medications    Medication Sig Start Date End Date Taking? Authorizing Provider   atorvastatin (LIPITOR) 10 MG tablet Take 10 mg by mouth nightly  Patient not taking: Reported on 10/28/2022    Historical Provider, MD   cyclobenzaprine (FLEXERIL) 10 MG tablet Take 10 mg by mouth at bedtime  Patient not taking: Reported on 10/28/2022    Historical Provider, MD   aspirin 81 MG EC tablet Take 1 tablet by mouth daily 10/15/22   Dmitry Love MD   insulin glargine (LANTUS) 100 UNIT/ML injection vial Inject 15 Units into the skin 2 times daily  Patient not taking: Reported on 10/28/2022 10/14/22   Adamaris Dean MD   insulin regular (HUMULIN R;NOVOLIN R) 100 UNIT/ML injection Sliding scale, use as previously instructed 10/14/22   Adamaris Dean MD       Allergies:   Iv contrast [iodides]    Social History:      The patient currently lives at home    TOBACCO:   reports that she has never smoked. She has never used smokeless tobacco.  ETOH:   has no history on file for alcohol use. E-cigarette/Vaping       Questions Responses    E-cigarette/Vaping Use     Start Date     Passive Exposure     Quit Date     Counseling Given     Comments               Family History:      Reviewed and negative in regards to presenting illness/complaint. History reviewed. No pertinent family history. REVIEW OF SYSTEMS COMPLETED:   Pertinent positives as noted in the HPI. All other systems reviewed and negative. PHYSICAL EXAM PERFORMED:    /68   Pulse 79   Temp (!) 96.1 °F (35.6 °C) (Temporal)   Resp 15   Ht 5' 1\" (1.549 m)   Wt 242 lb 1 oz (109.8 kg)   LMP 09/27/2022   SpO2 97%   BMI 45.74 kg/m²     General appearance:  No apparent distress, appears stated age and cooperative. HEENT:  Normal cephalic, atraumatic without obvious deformity. Pupils equal, round, and reactive to light. Extra ocular muscles intact. Conjunctivae/corneas clear. Neck: Supple, with full range of motion. No jugular venous distention. Trachea midline. Respiratory:  Normal respiratory effort. Clear to auscultation, bilaterally without Rales/Wheezes/Rhonchi. Cardiovascular:  Regular rate and rhythm with normal S1/S2 without murmurs, rubs or gallops. Abdomen: Soft, non-tender, non-distended with normal bowel sounds. Musculoskeletal: Left medial thigh with edema and warmth. Skin: Skin color, texture, turgor normal.  No rashes or lesions. Neurologic:  Neurovascularly intact without any focal sensory/motor deficits.  Cranial nerves: II-XII intact, grossly non-focal.  Psychiatric:  Alert and oriented, thought content appropriate, normal insight  Capillary Refill: Brisk,3 seconds, normal  Peripheral Pulses: +2 palpable, equal bilaterally       Labs:     Recent Labs     10/28/22  0126   WBC 13.3*   HGB 13.3   HCT 40.3        Recent Labs     10/28/22  0126   *   K 3.8   CL 97*   CO2 23   BUN 10   CREATININE 0.7   CALCIUM 9.3     Recent Labs     10/28/22  0126   AST 9*   ALT 13   BILITOT 0.4   ALKPHOS 126     No results for input(s): INR in the last 72 hours. Recent Labs     10/28/22  0126   TROPONINI <0.01       Urinalysis:      Lab Results   Component Value Date/Time    NITRU Negative 10/28/2022 01:33 AM    BLOODU Negative 10/28/2022 01:33 AM    SPECGRAV 1.015 10/28/2022 01:33 AM    GLUCOSEU >=1000 10/28/2022 01:33 AM       Radiology:     CXR: I have reviewed the CXR with the following interpretation: None  EKG:  I have reviewed the EKG with the following interpretation: Sinus rhythm with left anterior fascicular block    CT ABDOMEN PELVIS WO CONTRAST   Final Result   1. Fat stranding and inflammation centered in the medial upper left thigh   bordering the perineum. There is minimal stranding in the labia and left   inguinal canal but not the primary site. There may be a small phlegmon just   under the skin in the medial left upper thigh. There is no abscess at the   labia or inguinal canal.      2.  No fat stranding or inflammation within the internal abdomen, pelvis, and   retroperitoneum. 3.  Fat containing ventral upper abdominal hernia and left lower abdominal   hernia without acute complication. 4.  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 CT HEAD WO CONTRAST   Final Result   No acute intracranial hemorrhage, mass effect, midline shift, or   hydrocephalus. Stable appearance of the brain from recent prior exam..         XR CHEST PORTABLE   Final Result   No acute cardiopulmonary disease.              Consults:    IP CONSULT TO HOSPITALIST  IP CONSULT TO GENERAL SURGERY  IP CONSULT TO INFECTIOUS DISEASES  IP CONSULT TO PHARMACY    ASSESSMENT:    Active Hospital Problems    Diagnosis Date Noted    Abscess of left thigh [L02.416] 10/28/2022     Priority: Medium    Sepsis ([facility] [A41.9] 10/28/2022     Priority: Medium    Tachycardia [R00.0] 10/28/2022     Priority: Medium    Tachypnea [R06.82] 10/28/2022     Priority: Medium    Neutrophilic leukocytosis [W16.2] 10/28/2022     Priority: Medium    Hidradenitis suppurativa [L73.2] 10/28/2022     Priority: Medium    Groin abscess [L02.214] 10/28/2022     Priority: Medium    Uncontrolled type 2 diabetes mellitus with hypoglycemia without coma [facility]) [E11.649] 10/14/2022     Priority: Medium    Class 3 severe obesity with serious comorbidity and body mass index (BMI) of 40.0 to 44.9 in adult ([facility] [E66.01, Z68.41] 10/14/2022     Priority: Medium         PLAN:    1. Left groin abscess versus necrotizing fasciitis. Patient started on broad-spectrum antibiotics. ID consulted. General surgery consulted for I&D. Plan for I&D today. 2.  Type 2 diabetes. We will continue basal bolus correction. Monitor blood sugars closely. 3.  TIA. Patient with previous history of TIA. Will monitor neuro status. Continue aspirin and statins. 4.  Acute pain. Patient will be treated with as needed Dilaudid as needed. May need to adjust pain medications following surgery. DVT Prophylaxis: Lovenox  Diet: Diet NPO  Code Status: Full Code    PT/OT Eval Status: Pending    Dispo -Home when stable       Joe Perry MD    Thank you No primary care provider on file. for the opportunity to be involved in this patient's care. If you have any questions or concerns please feel free to contact me at [phone].

## 2022-10-28 NOTE — PROGRESS NOTES
Patient to PACU from OR. Report received from [facility] and [address] [place] [address] with anesthesia. Patient with eyes closed, alert to voice and placed on 4L O2 per NC. Patient without s/s of pain/distress noted when assessed. SCD's in place; ICE applied. VS obtained and filed. Will continue to monitor.

## 2022-10-28 NOTE — CONSULTS
Pharmacy Note  Vancomycin Consult    Lisa Bethea is a 50 y.o. female started on Vancomycin X 7 days  for SSTI; consult received from Dr. Ollie Adkins  to manage therapy. Also receiving the following antibiotics:     - Clinda 600mg q8h ( d/c'd on 10/28/22)  - Rocephin 2gm daily ( started on 10/28 X 7 d)    Allergies: Iv contrast [iodides]     Tmax: 98    Recent Labs     10/28/22  0126   CREATININE 0.7       Recent Labs     10/28/22  0126   WBC 13.3*       Estimated Creatinine Clearance: 110 mL/min (based on SCr of 0.7 mg/dL). Intake/Output Summary (Last 24 hours) at 10/28/2022 1518  Last data filed at 10/28/2022 1324  Gross per 24 hour   Intake 250 ml   Output --   Net 250 ml       Wt Readings from Last 1 Encounters:   10/28/22 242 lb 1 oz (109.8 kg)         Body mass index is 45.74 kg/m². Culture Date      Source                       Results  10/28/22              Blood                       N/A    Loading dose (critically ill or in ICU, require dialysis or renal replacement therapy): Vancomycin 25 mg/kg IVPB x 1 (maximum 2500 mg). Maintenance dose: 15 mg/kg (maximum: 2000 mg/dose and 4500 mg/day) starting at the next dosing interval determined by renal function  Pulse dose: fluctuating renal function, CHI, ESRD   Goal Vancomycin trough: 10-15 mcg/mL or 15-20 mcg/mL   Goal Vancomycin AUC: 400-600     Assessment/Plan:  Will initiate Vancomycin with a one time loading dose of 1750 mg x1, followed by 1000 mg IV every 12 hours. Regimen 1000 mg IV every 12 hours. Start time 05:00 on 10/29/2022  Predictions  AUC24,ss 483 mg/L.hr  Probability of AUC24 > 400 64 %  Ctrough,ss 13.8 mg/L     Vancomycin level ordered for 10/31 ~5 am. Timing of trough level will be determined based on culture results, renal function, and clinical response. Thank you for the consult. Day 2  Estimated Creatinine Clearance: 154 mL/min (based on SCr of 0.5 mg/dL).   Current regimen: vancomycin 1000 mg q12h  Calc AUC: 349  Will increase dose to 1250 mg q12h  Check vanc trough tomorrow 10/30 @ 0500    Candice Grimm, PharmD 10/29/2022 1:45 PM

## 2022-10-28 NOTE — PROGRESS NOTES
OR nurse called this nurse and requested this nurse take the schedules rocephin to the OR. This nurse pulled it from [address] and took it to the OR, handed it to [address] [place] [address].

## 2022-10-28 NOTE — PROGRESS NOTES
4 Eyes Skin Assessment     The patient is being assess for  Admission    I agree that 2 RN's have performed a thorough Head to Toe Skin Assessment on the patient. ALL assessment sites listed below have been assessed. Areas assessed by both nurses:   [x]   Head, Face, and Ears   [x]   Shoulders, Back, and Chest  [x]   Arms, Elbows, and Hands   [x]   Coccyx, Sacrum, and Ischum  [x]   Legs, Feet, and Heels        Does the Patient have Skin Breakdown? Yes a wound was noted on the Admission Assessment and an WOUND LDA was Initiated documentation include the Roselyn-wound, Wound Assessment, Measurements, Dressing Treatment, Drainage, and Color\",        Fold in mid abdomen is red and excoriated with foul odor. Left inner thigh: 1.5cm non blanching pustule with purulent tan drainage and foul odor.      Georgi Prevention initiated:  NA   Wound Care Orders initiated:  Yes      Juanjo Wynn consulted for Pressure Injury (Stage 3,4, Unstageable, DTI, NWPT, and Complex wounds):  NA      Nurse 1 eSignature: Electronically signed by Wade Albarado RN on 10/28/22 at 5:39 AM EDT    **SHARE this note so that the co-signing nurse is able to place an eSignature**    Nurse 2 eSignature: Electronically signed by Jenna Looney RN on 10/28/22 at 6:36 AM EDT

## 2022-10-28 NOTE — OP NOTE
Operative Note      Patient: Linda Reddy  YOB: 1970  MRN: [id number]    Date of Procedure: 10/28/2022    Pre-Op Diagnosis: Left Leg Abscess    Post-Op Diagnosis: Same       Procedure(s):  LEFT VANI  DEBRIDEMENT INCISION AND DRAINAGE    Surgeon(s):  Miya Goins MD    Resident:  Mralon Hahn MD    Assistant:   Surgical Assistant: Charleen Portillo    Anesthesia: General    Estimated Blood Loss (mL): Minimal    Complications: None    Specimens:   ID Type Source Tests Collected by Time Destination   1 : SWAB WOUND LEFT GROIN Specimen Groin CULTURE, FUNGUS, CULTURE, SURGICAL, CULTURE WITH SMEAR, ACID FAST Pancho Gray MD 10/28/2022 9935      Indication:  Linda Reddy is a 50year old female with DM who presented with a left thigh abscess with no clear rim enhancing lesion on CT, but rather a large amount of fat stranding and changes more consistent with phlegmon. Nevertheless, physical exam is consistent with thigh abscess; therefore, the patient was recommended to undergo incision and drainage in the OR. After discussion of the risks, benefits, and alternatives, the patient wished to proceed. Findings: Left upper thigh abscess with surrounding edematous tissue with no tissue necrosis. Detailed Description of Procedure: The patient was brought to the operating room and placed in lithotomy position. After induction of general anesthesia the patient was prepped and draped in sterile fashion and antibiotics were given. A pre-procedural time out was called and all present were in agreement. A #15 blade scalpel was used to make an incision directly over the area of greatest fluctuance of the upper left thigh with immediate return of purulent fluid which was swabbed for culture. The incision was extended and blunt dissection was used to carry it down to and including subcutaneous tissue.  There was an area of induration a few centimeters lateral to the initial incision, which was incised with a #15 blade and used as a counter-incision. Blunt dissection was used to disrupt septations and ensure that adequate drainage was achieved. Next the wound was copiously irrigated with sterile saline until the return of clear and colorless fluid. Electrocautery was used to achieve hemostasis. Both incision were then used to pack the cavity with iodoform packing. After which a dressing was applied. The patient tolerated the procedure well and was taken to the recovery room in stable condition. Dr. Eduardo Garcia was scrubbed and present for the entire case. Electronically signed by Dulce Navarro MD on 10/28/2022 at 5:11 PM      Surgery Staff    I have examined this patient, and read and agree with the note by Dulce Navarro MD  from today; more than half of the total time was spent by me on the encounter.      Nabor Dumont MD

## 2022-10-28 NOTE — PLAN OF CARE
Problem: Pain  Goal: Verbalizes/displays adequate comfort level or baseline comfort level  10/28/2022 1326 by Milly Luis RN  Flowsheets (Taken 10/28/2022 1326)  Verbalizes/displays adequate comfort level or baseline comfort level:   Encourage patient to monitor pain and request assistance   Assess pain using appropriate pain scale   Administer analgesics based on type and severity of pain and evaluate response   Implement non-pharmacological measures as appropriate and evaluate response  10/28/2022 1325 by Milly Luis RN  Flowsheets (Taken 10/28/2022 1325)  Verbalizes/displays adequate comfort level or baseline comfort level:   Encourage patient to monitor pain and request assistance   Assess pain using appropriate pain scale   Administer analgesics based on type and severity of pain and evaluate response   Implement non-pharmacological measures as appropriate and evaluate response     Problem: Safety - Adult  Goal: Free from fall injury  Flowsheets (Taken 10/28/2022 1326)  Free From Fall Injury:   Instruct family/caregiver on patient safety   Based on caregiver fall risk screen, instruct family/caregiver to ask for assistance with transferring infant if caregiver noted to have fall risk factors

## 2022-10-28 NOTE — CONSULTS
Department of General Surgery Consult    PATIENT NAME: Donta Conde   YOB: 1970    ADMISSION DATE: 10/27/2022 11:25 PM      TODAY'S DATE: 10/28/2022    Reason for Consult:  left groin abscess    Chief Complaint: L groin pain/swelling    Requesting Physician:  Polina Perez    HISTORY OF PRESENT ILLNESS:              The patient is a 50 y.o. female who presents with painful swelling in the left groin, adjacent to the labia, which began developing ~1 week ago. Notes pain, no spontaneous drainage, no fever/chills. No prior h/o similar lesions in groin but notes what sounds like symptomatic axillary hidradenitis in the past.  Also notes h/o poorly-controlled DM.     Past Medical History:        Diagnosis Date    Diabetes mellitus ([address]     Headache     Hyperlipidemia     TIA (transient ischemic attack)     ,  per pt       Past Surgical History:        Procedure Laterality Date     SECTION      CHOLECYSTECTOMY         Current Medications:   Current Facility-Administered Medications: insulin glargine (LANTUS) injection vial 15 Units, 15 Units, SubCUTAneous, BID  atorvastatin (LIPITOR) tablet 10 mg, 10 mg, Oral, Nightly  aspirin EC tablet 81 mg, 81 mg, Oral, Daily  glucose chewable tablet 16 g, 4 tablet, Oral, PRN  dextrose bolus 10% 125 mL, 125 mL, IntraVENous, PRN **OR** dextrose bolus 10% 250 mL, 250 mL, IntraVENous, PRN  glucagon (rDNA) injection 1 mg, 1 mg, SubCUTAneous, PRN  dextrose 10 % infusion, , IntraVENous, Continuous PRN  sodium chloride flush 0.9 % injection 10 mL, 10 mL, IntraVENous, 2 times per day  sodium chloride flush 0.9 % injection 10 mL, 10 mL, IntraVENous, PRN  0.9 % sodium chloride infusion, , IntraVENous, PRN  enoxaparin (LOVENOX) injection 40 mg, 40 mg, SubCUTAneous, Daily  ondansetron (ZOFRAN) injection 4 mg, 4 mg, IntraVENous, Q4H PRN  polyethylene glycol (GLYCOLAX) packet 17 g, 17 g, Oral, Daily PRN  acetaminophen (TYLENOL) tablet 650 mg, 650 mg, Oral, Q4H PRN **OR** acetaminophen (TYLENOL) suppository 650 mg, 650 mg, Rectal, Q4H PRN  insulin lispro (HUMALOG) injection vial 0-8 Units, 0-8 Units, SubCUTAneous, TID WC  insulin lispro (HUMALOG) injection vial 0-4 Units, 0-4 Units, SubCUTAneous, Nightly  clindamycin (CLEOCIN) 600 mg in dextrose 5 % 50 mL IVPB, 600 mg, IntraVENous, Q6H  miconazole (MICOTIN) 2 % powder, , Topical, BID  Prior to Admission medications    Medication Sig Start Date End Date Taking? Authorizing Provider   atorvastatin (LIPITOR) 10 MG tablet Take 10 mg by mouth nightly  Patient not taking: Reported on 10/28/2022    Historical Provider, MD   cyclobenzaprine (FLEXERIL) 10 MG tablet Take 10 mg by mouth at bedtime  Patient not taking: Reported on 10/28/2022    Historical Provider, MD   aspirin 81 MG EC tablet Take 1 tablet by mouth daily 10/15/22   Kristian Schirmer, MD   insulin glargine (LANTUS) 100 UNIT/ML injection vial Inject 15 Units into the skin 2 times daily  Patient not taking: Reported on 10/28/2022 10/14/22   Abi Aranda MD   insulin regular (HUMULIN R;NOVOLIN R) 100 UNIT/ML injection Sliding scale, use as previously instructed 10/14/22   Abi Aranda MD        Allergies: Iv contrast [iodides]    Social History:   TOBACCO:   reports that she has never smoked. She has never used smokeless tobacco.  ETOH:   has no history on file for alcohol use. DRUGS:   has no history on file for drug use. OCCUPATION:  @[employer]  Patient currently lives with family      Family History:    History reviewed. No pertinent family history. REVIEW OF SYSTEMS:  CONSTITUTIONAL:  positive for  malaise  HEENT:  negative  RESPIRATORY:  negative  CARDIOVASCULAR:  negative  GASTROINTESTINAL:  negative except for    GENITOURINARY:  negative  HEMATOLOGIC/LYMPHATIC:  negative  NEUROLOGICAL:  Negative  * All other ROS reviewed and negative.        PHYSICAL EXAM:  VITALS:  BP (!) 93/59   Pulse 97   Temp 98.1 °F (36.7 °C) (Oral)   Resp 16   Ht 5' 1\" (1.549 m) Wt 242 lb 1 oz (109.8 kg)   LMP 09/27/2022   SpO2 94%   BMI 45.74 kg/m²   24HR INTAKE/OUTPUT:    No intake/output data recorded. I/O this shift:  In: 240 [P.O.:240]  Out: -       CONSTITUTIONAL:  alert, no apparent distress and morbidly obese  EYES:  PERRL, sclera clear  ENT:  Normocephalic,atraumatic, without obvious abnormality  NECK:  supple, symmetrical, trachea midline  LUNGS: Resp effort easy and unlabored,    CARDIOVASCULAR:  NO JVD, regular rate and rhythm and    ABDOMEN:   ,  , soft, non-distended,  , involuntary guarding absent,   and    MUSCULOSKELETAL: No clubbing or cyanosis, 0+ pitting edema lower extremities  NEUROLOGIC:  Mental Status Exam:  Level of Alertness:   awake  PSYCHIATRIC:   person, place, time  SKIN:  L groin  - ~1.5x2cm area of ulceration/granulation tissue w/ surrounding induration, tenderness to minimal palpation, no overt drainage, mild surrounding erythema    DATA:    CBC:   Recent Labs     10/28/22  0126   WBC 13.3*   HGB 13.3   HCT 40.3        BMP:    Recent Labs     10/28/22  0126   *   K 3.8   CL 97*   CO2 23   BUN 10   CREATININE 0.7   GLUCOSE 338*     Hepatic:   Recent Labs     10/28/22  0126   AST 9*   ALT 13   BILITOT 0.4   ALKPHOS 126     Mag:    No results for input(s): MG in the last 72 hours. Phos:   No results for input(s): PHOS in the last 72 hours. INR: No results for input(s): INR in the last 72 hours. Radiology Review: Images personally reviewed by me. CT abd/pelvis    1. Fat stranding and inflammation centered in the medial upper left thigh   bordering the perineum. There is minimal stranding in the labia and left   inguinal canal but not the primary site. There may be a small phlegmon just   under the skin in the medial left upper thigh. There is no abscess at the   labia or inguinal canal.       2.  No fat stranding or inflammation within the internal abdomen, pelvis, and   retroperitoneum.        3.  Fat containing ventral upper abdominal hernia and left lower abdominal   hernia without acute complication. 4.  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 IMPRESSION/RECOMMENDATIONS:    1) L groin abscess - I think this would best be managed by incision/drainage under anesthesia in OR. Will make pt NPO now, and look for OR time later this afternoon. I discussed this plan in detail with the patient, including risks such as injury to surrounding structures and possible need for additional procedures to drain. Patient indicates she understands, asks appropriate questions, and agrees with the plan. 2) Abdominal wall hernia - based on location, this could actually be a Spigelian hernia, but also could be related to prior . Also has smaller primary epigastric hernia. Neither of these hernias are acute issues, and we could discuss, in the office, eventual repair at a later date.       Electronically signed by Rg Walter MD     [address] [facility]  [id number]

## 2022-10-28 NOTE — ANESTHESIA PRE PROCEDURE
Department of Anesthesiology  Preprocedure Note       Name:  Fayette Castleman   Age:  50 y.o.  :  1970                                          MRN:  [id number]         Date:  10/28/2022      Surgeon: Alissa Sánchez):  Chani Ortiz MD    Procedure: Procedure(s):  LEFT LEG DEBRIDEMENT INCISION AND DRAINAGE    Medications prior to admission:   Prior to Admission medications    Medication Sig Start Date End Date Taking?  Authorizing Provider   atorvastatin (LIPITOR) 10 MG tablet Take 10 mg by mouth nightly  Patient not taking: Reported on 10/28/2022    Historical Provider, MD   cyclobenzaprine (FLEXERIL) 10 MG tablet Take 10 mg by mouth at bedtime  Patient not taking: Reported on 10/28/2022    Historical Provider, MD   aspirin 81 MG EC tablet Take 1 tablet by mouth daily 10/15/22   Petr Sampson MD   insulin glargine (LANTUS) 100 UNIT/ML injection vial Inject 15 Units into the skin 2 times daily  Patient not taking: Reported on 10/28/2022 10/14/22   Rianna Stock MD   insulin regular (HUMULIN R;NOVOLIN R) 100 UNIT/ML injection Sliding scale, use as previously instructed 10/14/22   Rianna Stock MD       Current medications:    Current Facility-Administered Medications   Medication Dose Route Frequency Provider Last Rate Last Admin    insulin glargine (LANTUS) injection vial 15 Units  15 Units SubCUTAneous BID Neal Lima MD   15 Units at 10/28/22 0856    atorvastatin (LIPITOR) tablet 10 mg  10 mg Oral Nightly Neal Lima MD        aspirin EC tablet 81 mg  81 mg Oral Daily Neal Lima MD   81 mg at 10/28/22 0848    glucose chewable tablet 16 g  4 tablet Oral PRN Neal Lima MD        dextrose bolus 10% 125 mL  125 mL IntraVENous PRN Neal Lima MD        Or    dextrose bolus 10% 250 mL  250 mL IntraVENous PRN Neal Lima MD        glucagon (rDNA) injection 1 mg  1 mg SubCUTAneous PRN Neal Lima MD        dextrose 10 % infusion   IntraVENous Continuous PRN Genevieve Tej Baugh MD        sodium chloride flush 0.9 % injection 10 mL  10 mL IntraVENous 2 times per day Duyen Karimi MD   10 mL at 10/28/22 0851    sodium chloride flush 0.9 % injection 10 mL  10 mL IntraVENous PRN Duyen Karimi MD   10 mL at 10/28/22 1324    0.9 % sodium chloride infusion   IntraVENous PRN Duyen Karimi MD        enoxaparin (LOVENOX) injection 40 mg  40 mg SubCUTAneous Daily Duyen Karimi MD   40 mg at 10/28/22 0849    ondansetron (ZOFRAN) injection 4 mg  4 mg IntraVENous Q4H PRN Duyen Karimi MD        polyethylene glycol [facility]) packet 17 g  17 g Oral Daily PRN Duyen Karimi MD        acetaminophen (TYLENOL) tablet 650 mg  650 mg Oral Q4H PRN Dueyn Karimi MD   650 mg at 10/28/22 0848    Or    acetaminophen (TYLENOL) suppository 650 mg  650 mg Rectal Q4H PRN Duyen Karimi MD        insulin lispro (HUMALOG) injection vial 0-8 Units  0-8 Units SubCUTAneous TID WC Duyen Karimi MD   4 Units at 10/28/22 1138    insulin lispro (HUMALOG) injection vial 0-4 Units  0-4 Units SubCUTAneous Nightly Duyen Karimi MD        miconazole (MICOTIN) 2 % powder   Topical BID Duyen Karimi MD   Given at 10/28/22 0855    cefTRIAXone (ROCEPHIN) 2,000 mg in dextrose 5 % 50 mL IVPB mini-bag  2,000 mg IntraVENous Q24H Melissa Solomon MD        vancomycin (VANCOCIN) 1,750 mg in dextrose 5 % 500 mL IVPB  1,750 mg IntraVENous Once Melissa Solomon MD        [START ON 10/29/2022] vancomycin 1000 mg IVPB in 250 mL D5W addavial  1,000 mg IntraVENous Q12H Melissa Solomon MD           Allergies:     Allergies   Allergen Reactions    Iv Contrast [Iodides]        Problem List:    Patient Active Problem List   Diagnosis Code    TIA (transient ischemic attack) G45.9    Class 3 severe obesity with serious comorbidity and body mass index (BMI) of 40.0 to 44.9 in adult ([email or web address] E66.01, Z68.41    Uncontrolled type 2 diabetes mellitus with hypoglycemia without coma ([facility] E11.649    Abscess of left thigh L02.416    Sepsis (HCC) A41.9    Tachycardia R00.0    Tachypnea V07.04    Neutrophilic leukocytosis B83.4    Hidradenitis suppurativa L73.2    Groin abscess L02.214       Past Medical History:        Diagnosis Date    Diabetes mellitus ([address]     Headache     Hyperlipidemia     TIA (transient ischemic attack)     , 2017 per pt       Past Surgical History:        Procedure Laterality Date     SECTION      CHOLECYSTECTOMY         Social History:    Social History     Tobacco Use    Smoking status: Never    Smokeless tobacco: Never   Substance Use Topics    Alcohol use: Not on file                                Counseling given: Not Answered      Vital Signs (Current):   Vitals:    10/28/22 0424 10/28/22 0530 10/28/22 0845 10/28/22 1430   BP: 109/60 118/65 (!) 93/59 95/67   Pulse: 97 (!) 106 97 81   Resp: 18 18 16 16   Temp:  98.1 °F (36.7 °C) 98.1 °F (36.7 °C) 98 °F (36.7 °C)   TempSrc:  Oral Oral Oral   SpO2: 95% 94% 94%    Weight:  242 lb 1 oz (109.8 kg)     Height:                                                  BP Readings from Last 3 Encounters:   10/28/22 95/67   10/14/22 126/74   09/10/22 112/72       NPO Status: Time of last liquid consumption: 0700                        Time of last solid consumption: 0700                        Date of last liquid consumption: 10/28/22                        Date of last solid food consumption: 10/28/22    BMI:   Wt Readings from Last 3 Encounters:   10/28/22 242 lb 1 oz (109.8 kg)   10/14/22 228 lb 4.8 oz (103.6 kg)   09/10/22 238 lb (108 kg)     Body mass index is 45.74 kg/m².     CBC:   Lab Results   Component Value Date/Time    WBC 13.3 10/28/2022 01:26 AM    RBC 5.03 10/28/2022 01:26 AM    HGB 13.3 10/28/2022 01:26 AM    HCT 40.3 10/28/2022 01:26 AM    MCV 80.1 10/28/2022 01:26 AM    RDW 15.0 10/28/2022 01:26 AM     10/28/2022 01:26 AM       CMP:   Lab Results   Component Value Date/Time     10/28/2022 01:26 AM    K 3.8 10/28/2022 01:26 AM    CL 97 10/28/2022 01:26 AM    CO2 23 10/28/2022 01:26 AM    BUN 10 10/28/2022 01:26 AM    CREATININE 0.7 10/28/2022 01:26 AM    GFRAA >60 10/13/2022 11:28 PM    AGRATIO 1.0 10/28/2022 01:26 AM    LABGLOM >60 10/28/2022 01:26 AM    GLUCOSE 338 10/28/2022 01:26 AM    PROT 7.9 10/28/2022 01:26 AM    CALCIUM 9.3 10/28/2022 01:26 AM    BILITOT 0.4 10/28/2022 01:26 AM    ALKPHOS 126 10/28/2022 01:26 AM    AST 9 10/28/2022 01:26 AM    ALT 13 10/28/2022 01:26 AM       POC Tests:   Recent Labs     10/28/22  1117   POCGLU 265*       Coags: No results found for: PROTIME, INR, APTT    HCG (If Applicable):   Lab Results   Component Value Date    PREGTESTUR Negative 10/28/2022        ABGs: No results found for: PHART, PO2ART, PDU5LNY, NWV2CRB, BEART, X2MXJHWZ     Type & Screen (If Applicable):  No results found for: LABABO, LABRH    Drug/Infectious Status (If Applicable):  No results found for: HIV, HEPCAB    COVID-19 Screening (If Applicable):   Lab Results   Component Value Date/Time    COVID19 Not Detected 10/28/2022 01:26 AM           Anesthesia Evaluation   no history of anesthetic complications:   Airway: Mallampati: II  TM distance: >3 FB   Neck ROM: limited  Mouth opening: > = 3 FB   Dental:          Pulmonary:Negative Pulmonary ROS                              Cardiovascular:    (+) hyperlipidemia                  Neuro/Psych:   (+) TIA, headaches:,             GI/Hepatic/Renal:   (+) GERD: well controlled,           Endo/Other:    (+) DiabetesType II DM, , .                 Abdominal:             Vascular: negative vascular ROS. Other Findings:           Anesthesia Plan      general     ASA 3     (Risks, benefits and alternatives of GA discussed with pt. Questions answered. Willing to proceed.)  Induction: intravenous. Anesthetic plan and risks discussed with patient.                         Davion Love MD   10/28/2022

## 2022-10-28 NOTE — CONSULTS
Consult placed to General Sx    Who:  Dr. Damir Doshi AM        Electronically signed by Desire Urban on 10/28/2022 at 8:37 AM

## 2022-10-29 VITALS
OXYGEN SATURATION: 96 % | RESPIRATION RATE: 18 BRPM | TEMPERATURE: 97.9 F | BODY MASS INDEX: 45.7 KG/M2 | HEIGHT: 61 IN | SYSTOLIC BLOOD PRESSURE: 104 MMHG | HEART RATE: 93 BPM | WEIGHT: 242.06 LBS | DIASTOLIC BLOOD PRESSURE: 57 MMHG

## 2022-10-29 LAB
ANION GAP SERPL CALCULATED.3IONS-SCNC: 11 MMOL/L (ref 3–16)
BASOPHILS ABSOLUTE: 0 K/UL (ref 0–0.2)
BASOPHILS RELATIVE PERCENT: 0.4 %
BUN BLDV-MCNC: 7 MG/DL (ref 7–20)
CALCIUM SERPL-MCNC: 8.4 MG/DL (ref 8.3–10.6)
CHLORIDE BLD-SCNC: 103 MMOL/L (ref 99–110)
CO2: 22 MMOL/L (ref 21–32)
CREAT SERPL-MCNC: <0.5 MG/DL (ref 0.6–1.1)
EOSINOPHILS ABSOLUTE: 0.2 K/UL (ref 0–0.6)
EOSINOPHILS RELATIVE PERCENT: 2.1 %
GFR SERPL CREATININE-BSD FRML MDRD: >60 ML/MIN/{1.73_M2}
GLUCOSE BLD-MCNC: 214 MG/DL (ref 70–99)
GLUCOSE BLD-MCNC: 238 MG/DL (ref 70–99)
GLUCOSE BLD-MCNC: 241 MG/DL (ref 70–99)
GLUCOSE BLD-MCNC: 249 MG/DL (ref 70–99)
HCT VFR BLD CALC: 35.8 % (ref 36–48)
HEMOGLOBIN: 11.9 G/DL (ref 12–16)
LYMPHOCYTES ABSOLUTE: 2.4 K/UL (ref 1–5.1)
LYMPHOCYTES RELATIVE PERCENT: 21.7 %
MCH RBC QN AUTO: 26.6 PG (ref 26–34)
MCHC RBC AUTO-ENTMCNC: 33.1 G/DL (ref 31–36)
MCV RBC AUTO: 80.4 FL (ref 80–100)
MONOCYTES ABSOLUTE: 0.8 K/UL (ref 0–1.3)
MONOCYTES RELATIVE PERCENT: 7.4 %
NEUTROPHILS ABSOLUTE: 7.6 K/UL (ref 1.7–7.7)
NEUTROPHILS RELATIVE PERCENT: 68.4 %
PDW BLD-RTO: 14.9 % (ref 12.4–15.4)
PERFORMED ON: ABNORMAL
PLATELET # BLD: 347 K/UL (ref 135–450)
PMV BLD AUTO: 7.6 FL (ref 5–10.5)
POTASSIUM REFLEX MAGNESIUM: 3.6 MMOL/L (ref 3.5–5.1)
RBC # BLD: 4.46 M/UL (ref 4–5.2)
SODIUM BLD-SCNC: 136 MMOL/L (ref 136–145)
WBC # BLD: 11.1 K/UL (ref 4–11)

## 2022-10-29 PROCEDURE — 6360000002 HC RX W HCPCS: Performed by: SURGERY

## 2022-10-29 PROCEDURE — 6370000000 HC RX 637 (ALT 250 FOR IP): Performed by: SURGERY

## 2022-10-29 PROCEDURE — 85025 COMPLETE CBC W/AUTO DIFF WBC: CPT

## 2022-10-29 PROCEDURE — 80048 BASIC METABOLIC PNL TOTAL CA: CPT

## 2022-10-29 PROCEDURE — 36415 COLL VENOUS BLD VENIPUNCTURE: CPT

## 2022-10-29 PROCEDURE — 99024 POSTOP FOLLOW-UP VISIT: CPT | Performed by: SURGERY

## 2022-10-29 PROCEDURE — 2580000003 HC RX 258: Performed by: SURGERY

## 2022-10-29 RX ORDER — OXYCODONE HYDROCHLORIDE 5 MG/1
5 TABLET ORAL EVERY 4 HOURS PRN
Status: DISCONTINUED | OUTPATIENT
Start: 2022-10-29 | End: 2022-10-29 | Stop reason: HOSPADM

## 2022-10-29 RX ORDER — OXYCODONE HYDROCHLORIDE 5 MG/1
5 TABLET ORAL EVERY 4 HOURS PRN
Qty: 12 TABLET | Refills: 0 | Status: SHIPPED | OUTPATIENT
Start: 2022-10-29 | End: 2022-11-01

## 2022-10-29 RX ORDER — ACETAMINOPHEN 500 MG
1000 TABLET ORAL EVERY 6 HOURS
Status: DISCONTINUED | OUTPATIENT
Start: 2022-10-29 | End: 2022-10-29 | Stop reason: HOSPADM

## 2022-10-29 RX ORDER — OXYCODONE HYDROCHLORIDE 5 MG/1
10 TABLET ORAL EVERY 4 HOURS PRN
Status: DISCONTINUED | OUTPATIENT
Start: 2022-10-29 | End: 2022-10-29 | Stop reason: HOSPADM

## 2022-10-29 RX ORDER — AMOXICILLIN AND CLAVULANATE POTASSIUM 875; 125 MG/1; MG/1
1 TABLET, FILM COATED ORAL 2 TIMES DAILY
Qty: 20 TABLET | Refills: 0 | Status: SHIPPED | OUTPATIENT
Start: 2022-10-29 | End: 2022-11-08

## 2022-10-29 RX ADMIN — INSULIN GLARGINE 15 UNITS: 100 INJECTION, SOLUTION SUBCUTANEOUS at 09:58

## 2022-10-29 RX ADMIN — ASPIRIN 81 MG: 81 TABLET, COATED ORAL at 09:55

## 2022-10-29 RX ADMIN — INSULIN LISPRO 2 UNITS: 100 INJECTION, SOLUTION INTRAVENOUS; SUBCUTANEOUS at 09:59

## 2022-10-29 RX ADMIN — VANCOMYCIN HYDROCHLORIDE 1000 MG: 1 INJECTION, POWDER, LYOPHILIZED, FOR SOLUTION INTRAVENOUS at 06:28

## 2022-10-29 RX ADMIN — ACETAMINOPHEN 1000 MG: 500 TABLET ORAL at 03:25

## 2022-10-29 RX ADMIN — ENOXAPARIN SODIUM 40 MG: 100 INJECTION SUBCUTANEOUS at 09:54

## 2022-10-29 RX ADMIN — MICONAZOLE NITRATE: 2 POWDER TOPICAL at 09:54

## 2022-10-29 RX ADMIN — INSULIN LISPRO 2 UNITS: 100 INJECTION, SOLUTION INTRAVENOUS; SUBCUTANEOUS at 12:10

## 2022-10-29 RX ADMIN — ACETAMINOPHEN 1000 MG: 500 TABLET ORAL at 09:55

## 2022-10-29 RX ADMIN — ACETAMINOPHEN 1000 MG: 500 TABLET ORAL at 16:02

## 2022-10-29 ASSESSMENT — PAIN DESCRIPTION - ORIENTATION
ORIENTATION: LEFT

## 2022-10-29 ASSESSMENT — PAIN SCALES - GENERAL
PAINLEVEL_OUTOF10: 2
PAINLEVEL_OUTOF10: 3
PAINLEVEL_OUTOF10: 2
PAINLEVEL_OUTOF10: 6

## 2022-10-29 ASSESSMENT — PAIN DESCRIPTION - LOCATION
LOCATION: INCISION;GROIN
LOCATION: INCISION
LOCATION: INCISION
LOCATION: INCISION;GROIN

## 2022-10-29 ASSESSMENT — PAIN DESCRIPTION - DESCRIPTORS
DESCRIPTORS: ACHING;DULL
DESCRIPTORS: ACHING
DESCRIPTORS: DISCOMFORT;ACHING
DESCRIPTORS: DISCOMFORT

## 2022-10-29 NOTE — PROGRESS NOTES
Physician Progress Note      PATIENT:               Armando Sesay  CSN #:                  [id number]  :                       1970  ADMIT DATE:       10/27/2022 11:25 PM  [address] [place] DATE:  RESPONDING  PROVIDER #:        Aleksandar Blank MD          QUERY TEXT:    Pt admitted with left groin abscess. Pt noted to have leukocytosis and   tachycardia. If possible, please document in the progress notes and discharge   summary if you are evaluating and /or treating any of the following: The medical record reflects the following:  Risk Factors: Obesity, diabetes  Clinical Indicators: Wbc 13.3, pulse up to 113, temp 99.4 down to 96.1, left   groin abscess, s/p I&D  Treatment: IV Vancomycin, IV Rocephin, blood cultures, wound culture, I&D,   serial labs, supportive care    Thank you,  [email or web address]  Options provided:  -- Sepsis, present on arrival  -- Left groin abscess without Sepsis  -- Other - I will add my own diagnosis  -- Disagree - Not applicable / Not valid  -- Disagree - Clinically unable to determine / Unknown  -- Refer to Clinical Documentation Reviewer    PROVIDER RESPONSE TEXT:    This patient has left groin abscess without Sepsis.     Query created by: Olga Hector on 10/29/2022 5:58 AM      Electronically signed by:  Aleksandar Blank MD 10/29/2022 2:40 PM

## 2022-10-29 NOTE — PLAN OF CARE
Discharge to home or other facility with appropriate resources  Outcome: Progressing  Flowsheets (Taken 10/28/2022 2135)  Discharge to home or other facility with appropriate resources:   Identify barriers to discharge with patient and caregiver   Arrange for needed discharge resources and transportation as appropriate   Identify discharge learning needs (meds, wound care, etc)   Arrange for interpreters to assist at discharge as needed   Refer to discharge planning if patient needs post-hospital services based on physician order or complex needs related to functional status, cognitive ability or social support system

## 2022-10-29 NOTE — DISCHARGE INSTRUCTIONS
Remove wound packing tomorrow. Clean wound daily with soap and water. Cover wound daily with a dry dressing to contain drainage. Take all your antibiotics until they are finished. Call for follow-up with Dr. Analisa Whiting in 10 to 14 days, [phone]    Return to [facility] with new or worsening symptoms.

## 2022-10-29 NOTE — PROGRESS NOTES
Hospitalist Progress Note      PCP: No primary care provider on file. Date of Admission: 10/27/2022    Chief Complaint:   Left leg pain    Hospital Course:      50 y.o. female who presented to [address] [place] [address] with left leg/groin pain. Patient also with increased swelling. Patient states she had a boil that got progressively worse. Patient with increasing pain and swelling over 2 days. Patient states she has had some boils in her axilla during her periods but they have never progressed beyond papules. Patient states her left groin is painful with leg movement. Patient with history of diabetes. No previous skin infections. Subjective:   Patient states left groin pain is controlled. Patient states she wants to try to leave today. She states her  is debilitated and she is the primary caregiver.     Medications:  Reviewed    Infusion Medications    dextrose      sodium chloride       Scheduled Medications    acetaminophen  1,000 mg Oral Q6H    vancomycin  1,250 mg IntraVENous Q12H    insulin glargine  15 Units SubCUTAneous BID    atorvastatin  10 mg Oral Nightly    aspirin  81 mg Oral Daily    sodium chloride flush  10 mL IntraVENous 2 times per day    enoxaparin  40 mg SubCUTAneous Daily    insulin lispro  0-8 Units SubCUTAneous TID WC    insulin lispro  0-4 Units SubCUTAneous Nightly    miconazole   Topical BID    cefTRIAXone (ROCEPHIN) IV  2,000 mg IntraVENous Q24H     PRN Meds: oxyCODONE **OR** oxyCODONE, HYDROmorphone **OR** HYDROmorphone, glucose, dextrose bolus **OR** dextrose bolus, glucagon (rDNA), dextrose, sodium chloride flush, sodium chloride, ondansetron, polyethylene glycol, acetaminophen **OR** acetaminophen      Intake/Output Summary (Last 24 hours) at 10/29/2022 1441  Last data filed at 10/29/2022 0631  Gross per 24 hour   Intake 500 ml   Output --   Net 500 ml       Physical Exam Performed:    BP (!) 104/57   Pulse 93   Temp 97.9 °F (36.6 °C) (Oral)   Resp 18   Ht 5' 1\" (1.549 m)   Wt 242 lb 1 oz (109.8 kg)   LMP 09/27/2022   SpO2 96%   BMI 45.74 kg/m²     General appearance: No apparent distress, appears stated age and cooperative. HEENT: Pupils equal, round, and reactive to light. Conjunctivae/corneas clear. Neck: Supple, with full range of motion. No jugular venous distention. Trachea midline. Respiratory:  Normal respiratory effort. Clear to auscultation, bilaterally without Rales/Wheezes/Rhonchi. Cardiovascular: Regular rate and rhythm with normal S1/S2 without murmurs, rubs or gallops. Abdomen: Soft, non-tender, non-distended with normal bowel sounds. Musculoskeletal: No clubbing, cyanosis or edema bilaterally. Full range of motion without deformity. Skin: Wound dressed. Neurologic:  Neurovascularly intact without any focal sensory/motor deficits. Cranial nerves: II-XII intact, grossly non-focal.  Psychiatric: Alert and oriented, thought content appropriate, normal insight  Capillary Refill: Brisk, 3 seconds, normal   Peripheral Pulses: +2 palpable, equal bilaterally       Labs:   Recent Labs     10/28/22  0126 10/29/22  0650   WBC 13.3* 11.1*   HGB 13.3 11.9*   HCT 40.3 35.8*    347     Recent Labs     10/28/22  0126 10/29/22  0650   * 136   K 3.8 3.6   CL 97* 103   CO2 23 22   BUN 10 7   CREATININE 0.7 <0.5*   CALCIUM 9.3 8.4     Recent Labs     10/28/22  0126   AST 9*   ALT 13   BILITOT 0.4   ALKPHOS 126     No results for input(s): INR in the last 72 hours. Recent Labs     10/28/22  0126   TROPONINI <0.01       Urinalysis:      Lab Results   Component Value Date/Time    NITRU Negative 10/28/2022 01:33 AM    BLOODU Negative 10/28/2022 01:33 AM    SPECGRAV 1.015 10/28/2022 01:33 AM    GLUCOSEU >=1000 10/28/2022 01:33 AM       Radiology:  CT ABDOMEN PELVIS WO CONTRAST   Final Result   1. Fat stranding and inflammation centered in the medial upper left thigh   bordering the perineum.   There is minimal stranding in the labia and left   inguinal canal but not the primary site. There may be a small phlegmon just   under the skin in the medial left upper thigh. There is no abscess at the   labia or inguinal canal.      2.  No fat stranding or inflammation within the internal abdomen, pelvis, and   retroperitoneum. 3.  Fat containing ventral upper abdominal hernia and left lower abdominal   hernia without acute complication. 4.  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 CT HEAD WO CONTRAST   Final Result   No acute intracranial hemorrhage, mass effect, midline shift, or   hydrocephalus. Stable appearance of the brain from recent prior exam..         XR CHEST PORTABLE   Final Result   No acute cardiopulmonary disease. Assessment/Plan:    Active Hospital Problems    Diagnosis     Abscess of left thigh [L02.416]      Priority: Medium    Sepsis ([facility] [A41.9]      Priority: Medium    Tachycardia [R00.0]      Priority: Medium    Tachypnea [R06.82]      Priority: Medium    Neutrophilic leukocytosis [Y70.2]      Priority: Medium    Hidradenitis suppurativa [L73.2]      Priority: Medium    Groin abscess [L02.214]      Priority: Medium    Uncontrolled type 2 diabetes mellitus with hypoglycemia without coma ([facility] [E11.649]      Priority: Medium    Class 3 severe obesity with serious comorbidity and body mass index (BMI) of 40.0 to 44.9 in adult ([facility] [E66.01, Z68.41]      Priority: Medium     1. Left groin abscess versus necrotizing fasciitis. Patient switched to ceftriaxone and vancomycin. Status post I&D. Surgery is following. Patient will need continued IV antibiotics until cultures are available. 2.  Type 2 diabetes. We will continue basal bolus correction. Monitor blood sugars closely. 3.  TIA. Patient with previous history of TIA. Will monitor neuro status. Continue aspirin and statins. 4.  Acute pain. Patient will be treated with as needed Dilaudid as needed.   May need to adjust pain medications following surgery. DVT Prophylaxis: Lovenox  Diet: ADULT DIET; Regular; 3 carb choices (45 gm/meal)  Code Status: Full Code  PT/OT Eval Status: Pending    Dispo - Home when stable. I do not believe patient can be discharged today unless ID agreeable to IV antibiotics at home. If that is the case patient will need PICC line placement.     Appropriate for A1 Discharge Unit: Leann Jauregui MD

## 2022-10-29 NOTE — PROGRESS NOTES
Left groin cleansed with bath wipes. Patted dry. Iodoform gauze in place. Small amount of burgundy drainage. Painful to touch.

## 2022-10-29 NOTE — PROGRESS NOTES
Patient and given discharge instructions. All questions and concerns were addressed. Patient dressing was changed. Wound clean dry and intact. Taxi cab called for the patient and Patient wheeled to car with all patient belongings.

## 2022-10-29 NOTE — PLAN OF CARE
Problem: Pain  Goal: Verbalizes/displays adequate comfort level or baseline comfort level  10/29/2022 1312 by Lawrence Vega RN  Outcome: Progressing  Flowsheets (Taken 10/29/2022 1312)  Verbalizes/displays adequate comfort level or baseline comfort level:   Encourage patient to monitor pain and request assistance   Assess pain using appropriate pain scale   Administer analgesics based on type and severity of pain and evaluate response   Implement non-pharmacological measures as appropriate and evaluate response  10/29/2022 0311 by Maile Farmer RN  Outcome: Progressing  Flowsheets (Taken 10/28/2022 2132)  Verbalizes/displays adequate comfort level or baseline comfort level:   Encourage patient to monitor pain and request assistance   Assess pain using appropriate pain scale   Administer analgesics based on type and severity of pain and evaluate response   Implement non-pharmacological measures as appropriate and evaluate response   Consider cultural and social influences on pain and pain management   Notify Licensed Independent Practitioner if interventions unsuccessful or patient reports new pain     Problem: Safety - Adult  Goal: Free from fall injury  10/29/2022 1312 by Lawrence Vega RN  Outcome: Progressing  Flowsheets (Taken 10/29/2022 1312)  Free From Fall Injury:   Instruct family/caregiver on patient safety   Based on caregiver fall risk screen, instruct family/caregiver to ask for assistance with transferring infant if caregiver noted to have fall risk factors  10/29/2022 0311 by Maile Farmer RN  Outcome: Progressing     Problem: Chronic Conditions and Co-morbidities  Goal: Patient's chronic conditions and co-morbidity symptoms are monitored and maintained or improved  10/29/2022 1312 by Lawrence Vega RN  Outcome: Progressing  Flowsheets (Taken 10/29/2022 1312)  Care Plan - Patient's Chronic Conditions and Co-Morbidity Symptoms are Monitored and Maintained or Improved:   Monitor and assess patient's chronic conditions and comorbid symptoms for stability, deterioration, or improvement   Collaborate with multidisciplinary team to address chronic and comorbid conditions and prevent exacerbation or deterioration   Update acute care plan with appropriate goals if chronic or comorbid symptoms are exacerbated and prevent overall improvement and discharge  10/29/2022 0311 by Eugenie Tucker RN  Outcome: Progressing  Flowsheets (Taken 10/28/2022 2135)  Care Plan - Patient's Chronic Conditions and Co-Morbidity Symptoms are Monitored and Maintained or Improved:   Monitor and assess patient's chronic conditions and comorbid symptoms for stability, deterioration, or improvement   Collaborate with multidisciplinary team to address chronic and comorbid conditions and prevent exacerbation or deterioration   Update acute care plan with appropriate goals if chronic or comorbid symptoms are exacerbated and prevent overall improvement and discharge

## 2022-10-29 NOTE — DISCHARGE INSTR - COC
Continuity of Care Form    Patient Name: Aminta Barrios   :  1970  MRN:  [id number]    Admit date:  10/27/2022  Discharge date:  10/108460    Code Status Order: Full Code   Advance Directives:   [facility] Documentation       Date/Time Healthcare Directive Type of Healthcare Directive Copy in [facility] [address] Agent's Name Healthcare Agent's Phone Number    10/28/22 1536 No, patient does not have an advance directive for healthcare treatment -- -- -- -- --            Admitting Physician:  Bijan Montero MD  PCP: No primary care provider on file. Discharging Nurse: [phone]  [facility] Unit/Room#: 0337/0337-01  Discharging Unit Phone Number: [phone]    Emergency Contact:   Extended Emergency Contact Information  Primary Emergency Contact: chloé chavez  Mobile Phone: [phone]  Relation: Spouse    Past Surgical History:  Past Surgical History:   Procedure Laterality Date     SECTION      CHOLECYSTECTOMY         Immunization History: There is no immunization history on file for this patient.     Active Problems:  Patient Active Problem List   Diagnosis Code    TIA (transient ischemic attack) G45.9    Class 3 severe obesity with serious comorbidity and body mass index (BMI) of 40.0 to 44.9 in adult [facility]) E66.01, Z68.41    Uncontrolled type 2 diabetes mellitus with hypoglycemia without coma ([facility] [address] E11.649    Abscess of left thigh L02.416    Sepsis (HCC) A41.9    Tachycardia R00.0    Tachypnea G35.06    Neutrophilic leukocytosis P07.5    Hidradenitis suppurativa L73.2    Groin abscess L02.214       Isolation/Infection:   Isolation            No Isolation          Patient Infection Status       Infection Onset Added Last Indicated Last Indicated By Review Planned Expiration Resolved Resolved By    None active    Resolved    COVID-19 (Rule Out) 10/28/22 10/28/22 10/28/22 COVID-19, Rapid (Ordered)   10/28/22 Rule-Out Test Resulted    COVID-19 (Rule Out) 09/10/22 09/10/22 09/10/22 COVID-19, Rapid (Ordered)   09/10/22 Rule-Out Test Resulted            Nurse Assessment:  Last Vital Signs: BP (!) 104/57   Pulse 93   Temp 97.9 °F (36.6 °C) (Oral)   Resp 18   Ht 5' 1\" (1.549 m)   Wt 242 lb 1 oz (109.8 kg)   LMP 09/27/2022   SpO2 96%   BMI 45.74 kg/m²     Last documented pain score (0-10 scale): Pain Level: 2  Last Weight:   Wt Readings from Last 1 Encounters:   10/28/22 242 lb 1 oz (109.8 kg)     Mental Status:  alert    IV Access:  - None    Nursing Mobility/ADLs:  Walking   Independent  Transfer  Independent  Bathing  Independent  Dressing  Independent  Toileting  Independent  Feeding  Independent  Med Admin  Independent  Med Delivery   whole    Wound Care Documentation and Therapy:  Incision 10/28/22 Groin Inner (Active)   Dressing Status Clean;Dry; Intact 10/29/22 0950   Closure Other (Comment) 10/29/22 0950   Incision Assessment Other (Comment) 10/28/22 2135   Drainage Amount Small 10/29/22 0950   Drainage Description Serosanguinous 10/29/22 0950   Odor Mild 10/29/22 0950   Number of days: 0        Elimination:  Continence: Bowel: Yes  Bladder: Yes  Urinary Catheter: None   Colostomy/Ileostomy/Ileal Conduit: No       Date of Last BM:     Intake/Output Summary (Last 24 hours) at 10/29/2022 1616  Last data filed at 10/29/2022 0631  Gross per 24 hour   Intake 500 ml   Output --   Net 500 ml     I/O last 3 completed shifts: In: 750 [P.O.:240; I.V.:10; IV Piggyback:500]  Out: -     Safety Concerns:     None    Impairments/Disabilities:      None    Nutrition Therapy:  Current Nutrition Therapy:   - Oral Diet:  Carb Control 4 carbs/meal (1800kcals/day)    Routes of Feeding: Oral  Liquids: Thin Liquids  Daily Fluid Restriction: no  Last Modified Barium Swallow with Video (Video Swallowing Test): not done    Treatments at the Time of Hospital Discharge:   Respiratory Treatments:   Oxygen Therapy:  is not on home oxygen therapy.   Ventilator:    - No ventilator support    Rehab Therapies:   Weight Bearing Status/Restrictions: No weight bearing restrictions  Other Medical Equipment (for information only, NOT a DME order): Other Treatments:     Patient's personal belongings (please select all that are sent with patient):  Glasses    RN SIGNATURE:  Electronically signed by Joann Wright RN on 10/29/22 at 4:20 PM EDT    CASE MANAGEMENT/SOCIAL WORK SECTION    Inpatient Status Date:     Readmission Risk Assessment Score:  Readmission Risk              Risk of Unplanned Readmission:  14           Discharging to Facility/ Agency   Name:   Address:  Phone:  Fax:    Dialysis Facility (if applicable)   Name:  Address:  Dialysis Schedule:  Phone:  Fax:    / signature: {Esignature:[id number]}    PHYSICIAN SECTION    Prognosis: {Prognosis:2050150473}    Condition at Discharge: [address] [place] [address] Patient Condition:[id number]}    Rehab Potential (if transferring to Rehab): {Prognosis:5921370567}    Recommended Labs or Other Treatments After Discharge: ***    Physician Certification: I certify the above information and transfer of Chavez Booker  is necessary for the continuing treatment of the diagnosis listed and that she requires {Admit to Appropriate Level of Care:71067} for {GREATER/LESS:216757295} 30 days.      Update Admission H&P: {CHP DME Changes in JCOEQ:500000969}    PHYSICIAN SIGNATURE:  {Esignature:211932022}

## 2022-10-29 NOTE — PROGRESS NOTES
Shift assessment completed. A/O x4. VSS. Dressing intact to L inner thigh/groin. PRN tylenol given for pain 5/10. HS snack given. Denies any needs.

## 2022-10-29 NOTE — DISCHARGE SUMMARY
Hospital Medicine Discharge Summary    Patient ID: Theresanataly Hutton      Patient's PCP: No primary care provider on file. Admit Date: 10/27/2022     Discharge Date:   10/29/2022    Admitting Provider: Tru Garcia MD     Discharge Provider: Mariia Morris MD     Discharge Diagnoses: Active Hospital Problems    Diagnosis     Abscess of left thigh [L02.416]      Priority: Medium    Sepsis ([facility] [address] [address] [A41.9]      Priority: Medium    Tachycardia [R00.0]      Priority: Medium    Tachypnea [R06.82]      Priority: Medium    Neutrophilic leukocytosis [F37.6]      Priority: Medium    Hidradenitis suppurativa [L73.2]      Priority: Medium    Groin abscess [L02.214]      Priority: Medium    Uncontrolled type 2 diabetes mellitus with hypoglycemia without coma ([facility] [E11.649]      Priority: Medium    Class 3 severe obesity with serious comorbidity and body mass index (BMI) of 40.0 to 44.9 in adult ([facility] [E66.01, Z68.41]      Priority: Medium       The patient was seen and examined on day of discharge and this discharge summary is in conjunction with any daily progress note from day of discharge. Hospital Course:   50 y.o. female who presented to [address] [place] [address] with left leg/groin pain. Patient also with increased swelling. Patient states she had a boil that got progressively worse. Patient with increasing pain and swelling over 2 days. Patient states she has had some boils in her axilla during her periods but they have never progressed beyond papules. Patient states her left groin is painful with leg movement. Patient with history of diabetes. No previous skin infections. Patient treated for:       1. Left groin abscess versus necrotizing fasciitis. Patient switched to ceftriaxone and vancomycin. Status post I&D. Group B strep grew out of her culture. Discussed with ID. They are okay with patient discharging on Augmentin. Surgery was okay with discharge.   Patient will follow with surgery in 2 weeks. 2.  Type 2 diabetes. We will continue basal bolus correction. Monitor blood sugars closely. Patient will resume her home regimen. 3.  TIA. Patient with previous history of TIA. Will monitor neuro status. Continue aspirin and statins. 4.  Acute pain. Patient discharged with oxycodone. Physical Exam Performed:     BP (!) 104/57   Pulse 93   Temp 97.9 °F (36.6 °C) (Oral)   Resp 18   Ht 5' 1\" (1.549 m)   Wt 242 lb 1 oz (109.8 kg)   LMP 09/27/2022   SpO2 96%   BMI 45.74 kg/m²       General appearance:  No apparent distress, appears stated age and cooperative. HEENT:  Normal cephalic, atraumatic without obvious deformity. Pupils equal, round, and reactive to light. Extra ocular muscles intact. Conjunctivae/corneas clear. Neck: Supple, with full range of motion. No jugular venous distention. Trachea midline. Respiratory:  Normal respiratory effort. Clear to auscultation, bilaterally without Rales/Wheezes/Rhonchi. Cardiovascular:  Regular rate and rhythm with normal S1/S2 without murmurs, rubs or gallops. Abdomen: Soft, non-tender, non-distended with normal bowel sounds. Musculoskeletal:  No clubbing, cyanosis or edema bilaterally. Full range of motion without deformity. Skin: Left groin wound dressed  Neurologic:  Neurovascularly intact without any focal sensory/motor deficits. Cranial nerves: II-XII intact, grossly non-focal.  Psychiatric:  Alert and oriented, thought content appropriate, normal insight  Capillary Refill: Brisk,< 3 seconds   Peripheral Pulses: +2 palpable, equal bilaterally       Labs:  For convenience and continuity at follow-up the following most recent labs are provided:      CBC:    Lab Results   Component Value Date/Time    WBC 11.1 10/29/2022 06:50 AM    HGB 11.9 10/29/2022 06:50 AM    HCT 35.8 10/29/2022 06:50 AM     10/29/2022 06:50 AM       Renal:    Lab Results   Component Value Date/Time     10/29/2022 06:50 AM    K 3.6 10/29/2022 06:50 AM  10/29/2022 06:50 AM    CO2 22 10/29/2022 06:50 AM    BUN 7 10/29/2022 06:50 AM    CREATININE <0.5 10/29/2022 06:50 AM    CALCIUM 8.4 10/29/2022 06:50 AM         Significant Diagnostic Studies    Radiology:   CT ABDOMEN PELVIS WO CONTRAST   Final Result   1. Fat stranding and inflammation centered in the medial upper left thigh   bordering the perineum. There is minimal stranding in the labia and left   inguinal canal but not the primary site. There may be a small phlegmon just   under the skin in the medial left upper thigh. There is no abscess at the   labia or inguinal canal.      2.  No fat stranding or inflammation within the internal abdomen, pelvis, and   retroperitoneum. 3.  Fat containing ventral upper abdominal hernia and left lower abdominal   hernia without acute complication. 4.  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 CT HEAD WO CONTRAST   Final Result   No acute intracranial hemorrhage, mass effect, midline shift, or   hydrocephalus. Stable appearance of the brain from recent prior exam..         XR CHEST PORTABLE   Final Result   No acute cardiopulmonary disease.                 Consults:     IP CONSULT TO HOSPITALIST  IP CONSULT TO GENERAL SURGERY  IP CONSULT TO INFECTIOUS DISEASES  IP CONSULT TO PHARMACY    Disposition: Home    Condition at Discharge: Stable    Discharge Instructions/Follow-up:   Follow up with Dr. Tho Thorpe  Specialty: Dermatology  to follow up on [facility] [facility] , [address] [place] [address] [phone]          Follow up with Dr. Rafael Lua DO  Specialty: [facility] Medicine, Sports Medicine  to establish a PCP [address] [place] [address] [phone]          Follow up with Dr. Mandi Jones MD in 2 week(s)  Specialty: General Surgery  Post hospital follow up [address] [facility]: [address] [facility] [address]   [id number] ECHOCARDIOGRAM  Monday Jan 16, 2023 10:30 AM  PREP:  * Arrive 30 minutes prior to exam     [facility] Echocardiogram  [id number]  [phone]     Code Status:  Full Code    Activity: activity as tolerated    Diet: Resume previous home diet      Discharge Medications:     Discharge Medication List as of 10/29/2022  4:20 PM             Details   oxyCODONE (ROXICODONE) 5 MG immediate release tablet Take 1 tablet by mouth every 4 hours as needed for Pain for up to 3 days. , Disp-12 tablet, R-0Normal      amoxicillin-clavulanate (AUGMENTIN) 875-125 MG per tablet Take 1 tablet by mouth 2 times daily for 10 days, Disp-20 tablet, R-0Normal                Details   aspirin 81 MG EC tablet Take 1 tablet by mouth daily, Disp-30 tablet, R-3Normal      insulin glargine (LANTUS) 100 UNIT/ML injection vial Inject 15 Units into the skin 2 times daily, Disp-10 mL, R-0Normal      insulin regular (HUMULIN R;NOVOLIN R) 100 UNIT/ML injection Sliding scale, use as previously instructed, Disp-10 mL, R-0Normal             Time Spent on discharge is more than 35 minutes in the examination, evaluation, counseling and review of medications and discharge plan. Signed:    Muna Kimble MD   10/29/2022      Thank you No primary care provider on file. for the opportunity to be involved in this patient's care. If you have any questions or concerns, please feel free to contact me at [phone].

## 2022-10-30 NOTE — ANESTHESIA POSTPROCEDURE EVALUATION
Department of Anesthesiology  Postprocedure Note    Patient: Lisa Bethea  MRN: [id number]  [place]: 1970  Date of evaluation: 10/30/2022      Procedure Summary     Date: 10/28/22 Room / Location: [address]    Anesthesia Start: 1630 Anesthesia Stop:     Procedure: LEFT VANI  DEBRIDEMENT INCISION AND DRAINAGE (Left: Groin) Diagnosis:       Abscess      (Left Leg Abscess)    Surgeons: Min Marie MD Responsible Provider: Cindy Hunter MD    Anesthesia Type: general ASA Status: 3          Anesthesia Type: No value filed. Samy Phase I: Samy Score: 9    Samy Phase II:        Anesthesia Post Evaluation    Patient location during evaluation: PACU  Patient participation: complete - patient participated  Level of consciousness: awake and alert  Airway patency: patent  Nausea & Vomiting: no nausea and no vomiting  Complications: no  Cardiovascular status: blood pressure returned to baseline  Respiratory status: acceptable  Hydration status: euvolemic  Comments: VSS on transfer to phase 2 recovery. No anesthetic complications.

## 2022-10-31 LAB
ANAEROBIC CULTURE: ABNORMAL
CULTURE SURGICAL: ABNORMAL
GRAM STAIN RESULT: ABNORMAL
ORGANISM: ABNORMAL
ORGANISM: ABNORMAL

## 2022-11-01 LAB
BLOOD CULTURE, ROUTINE: NORMAL
CULTURE, BLOOD 2: NORMAL

## 2022-11-03 NOTE — PROGRESS NOTES
Physician Progress Note      PATIENT:               Chacha Gao  CSN #:                  [id number]  :                       1970  ADMIT DATE:       10/27/2022 11:25 PM  [address] [place] DATE:        10/29/2022 6:00 PM  RESPONDING  PROVIDER #:        Verena Haley MD          QUERY TEXT:    Pt admitted with left groin and thigh cellulitis and abscess. Pt noted to have   DM. If possible, please document in progress notes and discharge summary the   relationship, if any, between cellulitis and DM. The medical record reflects the following:  Risk Factors: Obesity, abscess  Clinical Indicators: Per ED consult note \"Uncontrolled type 2 diabetes   mellitus with hypoglycemia without coma\". Per discharge summary \"Type 2   diabetes. We will continue basal bolus correction. Monitor blood sugars   closely\"  Treatment: SSI, I&D, serial labs, supportive care. Thank you,  [email or web address]  Options provided:  -- Left groin cellulitis and abscess associated with Diabetes  -- Left groin cellulitis and abscess unrelated to Diabetes  -- Other - I will add my own diagnosis  -- Disagree - Not applicable / Not valid  -- Disagree - Clinically unable to determine / Unknown  -- Refer to Clinical Documentation Reviewer    PROVIDER RESPONSE TEXT:    Left groin cellulitis and abscess associated with Diabetes. Query created by: Kylie Pastrana on 2022 12:53 PM      QUERY TEXT:    Patient admitted with abscess and cellulitis of groin. Noted documentation of   necrotizing fasciitis per discharge summary and \"no evidence of necrotizing   infection\" per ID note. If possible, please document in progress notes and discharge summary if you   are evaluating and /or treating any of the following:       The medical record reflects the following:  Risk Factors: Diabetes, obesity, groin abscess  Clinical Indicators: Per discharge summary \" Left groin abscess versus   necrotizing fasciitis\", Per ID consult note \"No evidence of necrotizing   infection\". Per Op note \"Left upper thigh abscess with surrounding edematous   tissue with no tissue necrosis\". Treatment: IV antibiotics, I&D, ID consult, serial labs, supportive care. Thank you,  [email or web address]  Options provided:  -- Necrotizing fasciitis ruled out  -- Necrotizing fasciitis confirmed  -- Other - I will add my own diagnosis  -- Disagree - Not applicable / Not valid  -- Disagree - Clinically unable to determine / Unknown  -- Refer to Clinical Documentation Reviewer    PROVIDER RESPONSE TEXT:    After study, necrotizing fasciitis ruled out.     Query created by: Caroline Mcbride on 11/2/2022 1:01 PM      Electronically signed by:  Charlene Beyer MD 11/3/2022 7:37 AM

## 2022-11-10 ENCOUNTER — NURSE TRIAGE (OUTPATIENT)
Dept: OTHER | Facility: CLINIC | Age: 52
End: 2022-11-10

## 2022-11-10 NOTE — TELEPHONE ENCOUNTER
Location of patient: [place]    Received call from [facility] [place]; Patient with Red Flag Complaint requesting to establish care with [facility]. Subjective: Caller states \"Hyperglycemia\"     Current Symptoms:   + FSBS 400's - recently moved to [place], unable to find doctor until now. Ran out of lantus, increased urine ouput, lightheaded, weakness     Onset:      More than a few months; worse the past two weeks     Pain Severity:   Tightness in chest     Temperature:    None     What has been tried:   Recent admission to hospital - d/c home     Pregnant: No     Recommended disposition: Call PCP now - Pt is un-established. RN advised to go to [facility]. Pt has establish care appt on 11/16 with [facility]. RN advised Pt she needs to be seen today. Care advice provided, patient verbalizes understanding; denies any other questions or concerns; instructed to call back for any new or worsening symptoms. Attention Provider: Thank you for allowing me to participate in the care of your patient. The patient was connected to triage in response to information provided to the [facility]. Please do not respond through this encounter as the response is not directed to a shared pool.     Reason for Disposition   Blood glucose > 400 mg/dL (22.2 mmol/L)    Protocols used: Diabetes - High Blood Sugar-ADULT-AH

## 2022-11-16 ENCOUNTER — OFFICE VISIT (OUTPATIENT)
Dept: FAMILY MEDICINE CLINIC | Age: 52
End: 2022-11-16
Payer: MEDICAID

## 2022-11-16 VITALS
WEIGHT: 233.4 LBS | HEART RATE: 96 BPM | SYSTOLIC BLOOD PRESSURE: 134 MMHG | OXYGEN SATURATION: 98 % | DIASTOLIC BLOOD PRESSURE: 78 MMHG | HEIGHT: 61 IN | RESPIRATION RATE: 18 BRPM | TEMPERATURE: 97.1 F | BODY MASS INDEX: 44.07 KG/M2

## 2022-11-16 DIAGNOSIS — Z00.00 ANNUAL PHYSICAL EXAM: Primary | ICD-10-CM

## 2022-11-16 DIAGNOSIS — E11.649 UNCONTROLLED TYPE 2 DIABETES MELLITUS WITH HYPOGLYCEMIA WITHOUT COMA (HCC): ICD-10-CM

## 2022-11-16 DIAGNOSIS — E66.01 CLASS 3 SEVERE OBESITY DUE TO EXCESS CALORIES WITH SERIOUS COMORBIDITY AND BODY MASS INDEX (BMI) OF 40.0 TO 44.9 IN ADULT (HCC): ICD-10-CM

## 2022-11-16 PROCEDURE — 99386 PREV VISIT NEW AGE 40-64: CPT | Performed by: PHYSICIAN ASSISTANT

## 2022-11-16 SDOH — ECONOMIC STABILITY: FOOD INSECURITY: WITHIN THE PAST 12 MONTHS, THE FOOD YOU BOUGHT JUST DIDN'T LAST AND YOU DIDN'T HAVE MONEY TO GET MORE.: NEVER TRUE

## 2022-11-16 SDOH — ECONOMIC STABILITY: FOOD INSECURITY: WITHIN THE PAST 12 MONTHS, YOU WORRIED THAT YOUR FOOD WOULD RUN OUT BEFORE YOU GOT MONEY TO BUY MORE.: NEVER TRUE

## 2022-11-16 ASSESSMENT — PATIENT HEALTH QUESTIONNAIRE - PHQ9
4. FEELING TIRED OR HAVING LITTLE ENERGY: 0
10. IF YOU CHECKED OFF ANY PROBLEMS, HOW DIFFICULT HAVE THESE PROBLEMS MADE IT FOR YOU TO DO YOUR WORK, TAKE CARE OF THINGS AT HOME, OR GET ALONG WITH OTHER PEOPLE: 0
2. FEELING DOWN, DEPRESSED OR HOPELESS: 0
SUM OF ALL RESPONSES TO PHQ9 QUESTIONS 1 & 2: 0
6. FEELING BAD ABOUT YOURSELF - OR THAT YOU ARE A FAILURE OR HAVE LET YOURSELF OR YOUR FAMILY DOWN: 0
3. TROUBLE FALLING OR STAYING ASLEEP: 0
SUM OF ALL RESPONSES TO PHQ QUESTIONS 1-9: 0
SUM OF ALL RESPONSES TO PHQ QUESTIONS 1-9: 0
8. MOVING OR SPEAKING SO SLOWLY THAT OTHER PEOPLE COULD HAVE NOTICED. OR THE OPPOSITE, BEING SO FIGETY OR RESTLESS THAT YOU HAVE BEEN MOVING AROUND A LOT MORE THAN USUAL: 0
SUM OF ALL RESPONSES TO PHQ QUESTIONS 1-9: 0
1. LITTLE INTEREST OR PLEASURE IN DOING THINGS: 0
9. THOUGHTS THAT YOU WOULD BE BETTER OFF DEAD, OR OF HURTING YOURSELF: 0
7. TROUBLE CONCENTRATING ON THINGS, SUCH AS READING THE NEWSPAPER OR WATCHING TELEVISION: 0
5. POOR APPETITE OR OVEREATING: 0
SUM OF ALL RESPONSES TO PHQ QUESTIONS 1-9: 0

## 2022-11-16 ASSESSMENT — SOCIAL DETERMINANTS OF HEALTH (SDOH): HOW HARD IS IT FOR YOU TO PAY FOR THE VERY BASICS LIKE FOOD, HOUSING, MEDICAL CARE, AND HEATING?: NOT HARD AT ALL

## 2022-11-16 NOTE — PROGRESS NOTES
[address] EXAMINATION       2022       CC:    Chief Complaint   Patient presents with    New Patient     Patient is here for establish care with [facility]       HPI: Theresa Hutton 1970 is a 54 y.o. who  NEW to provider  Moved from [place] 2022 and waited until obtained Medicaid Insurance. Out of meds x 1.5 months. Prior provider was [facility]. - Dr Kin Guerra. They moved her to be close to son. Medical issues include diabetes insuline dependent, hyperlipid,  Left inguinal hernia enlarging x 4 years. PREVENTIVE HEALTH:   Last eye exam:      Hearing concerns: Yes -decreased and tinnitus. Last Dental exam:     many years ago  Caffeine use:  1/ day  Exercise:  \" nothing\"  Diet: feels needs improvement on  \" everything\", has tried. Avoids bread and starches but candy is her downfall  Alcohol use: 0-1/ week  Tobacco/ Vapping/ marijuana use:  none  Mental Health; concerns of anxiety or depression? yes - \" every once in a while\". Has never been on meds  Perform routine skin checks? Yes  Perform monthly self breast exams? Yes  Last Mammo:   8 yrs ago ( )   Last gyn exam:   many years ago  Colonoscopy:  No prior colonoscopy  Advanced directives: no        REVIEW OF SYSTEMS:    Pertinent positive and negatives are in HPI. The remaining 14 ROS were reviewed and are unremarkable for other constitutional, EENT, cardiac, pulmonary, GI, , neurologic, musculoskeletal, or integumentary complaints      PAST MEDICAL HISTORY:      Diagnosis Date    Diabetes mellitus ([address]     Headache     Hyperlipidemia     TIA (transient ischemic attack)     ,  per pt     Past Surgical History:   Procedure Laterality Date     SECTION      CHOLECYSTECTOMY      LEG SURGERY Left 10/28/2022    LEFT VANI  DEBRIDEMENT INCISION AND DRAINAGE performed by Rd Sykes MD at [place] [address] and Family reviewed.     ALLERGIES:    Iv contrast [iodides]    MEDICATIONS:  Current Outpatient Medications   Medication Sig Dispense Refill    aspirin 81 MG EC tablet Take 1 tablet by mouth daily 30 tablet 3    insulin glargine (LANTUS) 100 UNIT/ML injection vial Inject 15 Units into the skin 2 times daily (Patient not taking: Reported on 11/16/2022) 10 mL 0    insulin regular (HUMULIN R;NOVOLIN R) 100 UNIT/ML injection Sliding scale, use as previously instructed (Patient not taking: Reported on 11/16/2022) 10 mL 0     No current facility-administered medications for this visit. PHYSICAL EXAM:   Vitals:    11/16/22 1300   BP: 134/78   Pulse: 96   Resp: 18   Temp: 97.1 °F (36.2 °C)   TempSrc: Temporal   SpO2: 98%   Weight: 233 lb 6.4 oz (105.9 kg)   Height: 5' 1\" (1.549 m)       Body mass index is 44.1 kg/m². GENERAL APPEARANCE:  Well nourished. No distress. Truncal obesity   HEENT: Normocephalic. Atraumatic. EYES: Vision intact. EOMI, PERRLA. Conjunctivae pink, moist. Sclera white. Cornea and lens without opacities. EARS: Auricles symmetrical without lesions or deformities. Canals are clear. TM's intact bilaterally. Hearing  intact. NOSE: patent. MOUTH:  Moist, teeth intact. Throat clear. NECK: No lymphadenopathy. Thyroid smooth, not enlarged. LUNGS: Clear to auscultation,. No wheezes, rales, or rhonci. Equal resonance to chest percussion. CHEST/SPINE: No skin changes or chest wall deformities. No CVAT. No spine or paraspinal muscle tenderness or deformities. Full range of motion in all planes. HEART:  Regular rate and rhythm. No murmurs, rubs, or gallops. VASCULAR:  No jugular venous distension or carotid bruits. Pulses equal and symmetrical upper and lower extremities. Capillary refill <3secs. ABDOMEN: Without scars. Soft, non-tender, normoactive bowel sounds. No pulsatile masses or hepatosplenomegaly. EXTREMITIES: No skin or bony deformities. Symmetrical strength. Full range of motion without eliciting pain.  Sensation and DTR's are equal and intact. NEUROLOGIC: Grossly non focal. Cranial Nerves II-XII intact. Negative Rhomberg. SKIN: Warm, dry and intact. No rashes, petechia, purpura. No suspicious lesions. No nail clubbing or cyanosis or edema. PSYCHIATRIC:  Mood, behavior, and judgement normal. Thought content normal.      ADDITIONAL DATA:  Prior clinic visit notes, labs, and imaging reports were reviewed. No orders of the defined types were placed in this encounter. ASSESSMENT & PLAN:  Annual physical exam  Uncontrolled type 2 diabetes mellitus    Class 3 severe obesity     Discussed with pt that she would be better served being followed by a physician. Personal call to Residency clinic . They will see her tomorrow 9:30am with  Dr Sohan Mccullough. Pt understands and agrees. All questions answered. Follow Up w residency clinic.     Electronically signed by SARIAH Aleman on 11/16/2022 at 1:30 PM

## 2022-11-16 NOTE — PROGRESS NOTES
[address] and [facility]                                             [facility], [address] [place] [address], [address] [place] [address]        Phone: [phone]                                     Name:  Yan Fajardo  :    1970      Consultants:   No care team member to display    Chief Complaint:     Yan Fajardo is a 54 y.o. female  who presents today for a New Patient care visit with [facility] as noted below. HPI:     Yan Fajardo is a 54year old female with history of T2DM on insulin, hyperlipidemia, class III obesity, hypertension, history of left leg abscess s/p debridement and I&D, history of TIAs. T2DM  -diagnosed 5 years ago  -patient taking insulin glargine 15 units subcutaneously twice daily  -not taking metformin due to diarrhea  -sugars 300-350, never lower than 200, every morning checks POC  -per chart last eye exam , need copy  -diabetic foot exam today  No results found for: LABA1C     Hypertension  /74  -lisinopril 2.5mg daily  Headaches, whoosing sound left ear  -occasional dizziness, light headedness  -proximal ICA plaques    Hyperlipidemia  -simvastatin 10 mg daily  -aspirin 81 mg    Class III Obesity  -stopped eating bread, limiting carbs  -1 soda a week, 1 cup coffee every other day  -constant thirst and urination    Left Leg Abscess s/p debridement and I&D  -patient had left thigh abscess with I&D and debridement on 10/28/22.  No tissue necrosis  -surgical culture revealing Group B strep and prevotella bivia  -patient was discharged with augmentin 875-125mg 10/27/22    Left sided hernia  -painful 10/10, occurs with eating  -denies nausea, vomiting    History of TIAs  -patient taking aspirin 81 mg daily    Health Maintenance  -last mammogram   -no past colonoscopy, turns 52 soon  -flu due today  -covid due        Patient Active Problem List   Diagnosis TIA (transient ischemic attack)    Class 3 severe obesity with serious comorbidity and body mass index (BMI) of 40.0 to 44.9 in adult [facility])    Uncontrolled type 2 diabetes mellitus with hypoglycemia without coma ([facility] [address]    Abscess of left thigh    Sepsis ([address]    Tachycardia    Tachypnea    Neutrophilic leukocytosis    Hidradenitis suppurativa    Groin abscess         Past Medical History:    Past Medical History:   Diagnosis Date    Diabetes mellitus ([facility] [address]     Headache     Hyperlipidemia     TIA (transient ischemic attack)     ,  per pt       Past Surgical History:  Past Surgical History:   Procedure Laterality Date     SECTION      CHOLECYSTECTOMY      LEG SURGERY Left 10/28/2022    LEFT VANI  DEBRIDEMENT INCISION AND DRAINAGE performed by Bret Hernadez MD at [address] [place] [address]:  Prior to Visit Medications    Medication Sig Taking? Authorizing Provider   aspirin 81 MG EC tablet Take 1 tablet by mouth daily  Lady Hernan MD   insulin glargine (LANTUS) 100 UNIT/ML injection vial Inject 15 Units into the skin 2 times daily  Patient not taking: Reported on 2022  Lefty Webber MD   insulin regular (HUMULIN R;NOVOLIN R) 100 UNIT/ML injection Sliding scale, use as previously instructed  Patient not taking: Reported on 2022  Lefty Webber MD       Allergies: Iv contrast [iodides]    Family History:   No family history on file.     Social History:  Social History       Tobacco History       Smoking Status  Never      Smokeless Tobacco Use  Never              Alcohol History       Alcohol Use Status  Not Asked              Drug Use       Drug Use Status  Not Asked              Sexual Activity       Sexually Active  Not Asked                       Health Maintenance Completed:  Health Maintenance   Topic Date Due    COVID-19 Vaccine (1) Never done    Pneumococcal 0-64 years Vaccine (1 - PCV) Never done    Diabetic foot exam  Never done    A1C test (Diabetic or Prediabetic) Never done    Lipids  Never done    HIV screen  Never done    Diabetic microalbuminuria test  Never done    Diabetic retinal exam  Never done    Hepatitis C screen  Never done    Hepatitis B vaccine (1 of 3 - Risk 3-dose series) Never done    DTaP/Tdap/Td vaccine (1 - Tdap) Never done    Cervical cancer screen  Never done    Colorectal Cancer Screen  Never done    Flu vaccine (1) Never done    Depression Screen  11/16/2023    Hepatitis A vaccine  Aged Out    Hib vaccine  Aged Out    Meningococcal (ACWY) vaccine  Aged Out            There is no immunization history on file for this patient. Review of Systems:  Review of Systems     Physical Exam:   There were no vitals filed for this visit. There is no height or weight on file to calculate BMI. Wt Readings from Last 3 Encounters:   11/16/22 233 lb 6.4 oz (105.9 kg)   10/28/22 242 lb 1 oz (109.8 kg)   10/14/22 228 lb 4.8 oz (103.6 kg)       BP Readings from Last 3 Encounters:   11/16/22 134/78   10/29/22 (!) 104/57   10/14/22 126/74       Physical Exam         Lab Review: {Recent FBQB:10264::\"[facility] applicable\"}       Assessment/Plan:  There are no diagnoses linked to this encounter.     Health Maintenance Due:  Health Maintenance Due   Topic Date Due    COVID-19 Vaccine (1) Never done    Pneumococcal 0-64 years Vaccine (1 - PCV) Never done    Diabetic foot exam  Never done    A1C test (Diabetic or Prediabetic)  Never done    Lipids  Never done    HIV screen  Never done    Diabetic microalbuminuria test  Never done    Diabetic retinal exam  Never done    Hepatitis C screen  Never done    Hepatitis B vaccine (1 of 3 - Risk 3-dose series) Never done    DTaP/Tdap/Td vaccine (1 - Tdap) Never done    Cervical cancer screen  Never done    Colorectal Cancer Screen  Never done    Flu vaccine (1) Never done        Health care decision maker:  {health care decision maker allegra:29788}  Vot-ER:  {vo+ER allegra:89964}      Health Maintenance: (USPSTF Recommendations)  (F) Breast Cancer Screen: (40-49 (C), 50-74 biennial screening mammogram (B))  (F) Cervical Cancer Screen: (21-29 q3yr cytology alone; 30-65 q3yr cytology alone, q5yr with hrHPV alone, or q5yr cytology+hrHPV (A))  (M) Prostate Cancer Screen: (54-79 yo discuss benefits/harm, does not recommend testing PSA in men >75 yo (D):   (M) AAA Screen: (men 73-69 yo who has ever smoked (B), consider in nonsmokers if high risk):  CRC/Colonoscopy Screening: (adults 39-53 (B), 50-75 (A))  Lung Ca Screening: Annual LDCT (+smoker age 49-80, smoked within 15 years, total of 20 pack yr history (B)):  DEXA Screen: (women >65 and older, <65 if at risk/postmenopausal (B))  HIV Screen: (16-65 yr old, and all pregnant patients (A)): Hep C Screen: (18-79 yr old (B)):  HCC Screen: (all pts with cirrhosis and high risk Hep B (US q6 mo)):  Immunizations:    RTC:  No follow-ups on file. EMR Dragon/transcription disclaimer:  Much of this encounter note is electronic transcription/translation of spoken language to printed texts. The electronic translation of spoken language may be erroneous, or at times, nonsensical words or phrases may be inadvertently transcribed.   Although I have reviewed the note for such errors, some may still exist.

## 2022-11-17 ENCOUNTER — HOSPITAL ENCOUNTER (OUTPATIENT)
Age: 52
Discharge: HOME | End: 2022-11-17
Payer: MEDICAID

## 2022-11-17 ENCOUNTER — TELEPHONE (OUTPATIENT)
Dept: PRIMARY CARE CLINIC | Age: 52
End: 2022-11-17

## 2022-11-17 ENCOUNTER — OFFICE VISIT (OUTPATIENT)
Dept: PRIMARY CARE CLINIC | Age: 52
End: 2022-11-17
Payer: MEDICAID

## 2022-11-17 VITALS
HEIGHT: 59 IN | TEMPERATURE: 97 F | WEIGHT: 233.8 LBS | BODY MASS INDEX: 47.13 KG/M2 | SYSTOLIC BLOOD PRESSURE: 132 MMHG | HEART RATE: 80 BPM | OXYGEN SATURATION: 96 % | RESPIRATION RATE: 18 BRPM | DIASTOLIC BLOOD PRESSURE: 74 MMHG

## 2022-11-17 DIAGNOSIS — E11.9 TYPE 2 DIABETES MELLITUS WITHOUT COMPLICATION, WITH LONG-TERM CURRENT USE OF INSULIN (HCC): ICD-10-CM

## 2022-11-17 DIAGNOSIS — K43.9 VENTRAL HERNIA WITHOUT OBSTRUCTION OR GANGRENE: ICD-10-CM

## 2022-11-17 DIAGNOSIS — Z79.4 TYPE 2 DIABETES MELLITUS WITHOUT COMPLICATION, WITH LONG-TERM CURRENT USE OF INSULIN (HCC): Primary | ICD-10-CM

## 2022-11-17 DIAGNOSIS — I65.23 INTERNAL CAROTID ARTERY STENOSIS, BILATERAL: ICD-10-CM

## 2022-11-17 DIAGNOSIS — Z86.73 HISTORY OF TIA (TRANSIENT ISCHEMIC ATTACK): ICD-10-CM

## 2022-11-17 DIAGNOSIS — Z12.11 SCREENING FOR COLON CANCER: ICD-10-CM

## 2022-11-17 DIAGNOSIS — E11.9 TYPE 2 DIABETES MELLITUS WITHOUT COMPLICATION, WITH LONG-TERM CURRENT USE OF INSULIN (HCC): Primary | ICD-10-CM

## 2022-11-17 DIAGNOSIS — D27.0 TERATOMA OF OVARY, RIGHT: ICD-10-CM

## 2022-11-17 DIAGNOSIS — I10 PRIMARY HYPERTENSION: ICD-10-CM

## 2022-11-17 DIAGNOSIS — E78.5 HYPERLIPIDEMIA, UNSPECIFIED HYPERLIPIDEMIA TYPE: ICD-10-CM

## 2022-11-17 DIAGNOSIS — Z79.4 TYPE 2 DIABETES MELLITUS WITHOUT COMPLICATION, WITH LONG-TERM CURRENT USE OF INSULIN (HCC): ICD-10-CM

## 2022-11-17 DIAGNOSIS — Z12.31 SCREENING MAMMOGRAM FOR BREAST CANCER: ICD-10-CM

## 2022-11-17 LAB
CHOLESTEROL, TOTAL: 231 MG/DL (ref 0–199)
CREATININE URINE: 100.4 MG/DL (ref 28–259)
HBA1C MFR BLD: 10.6 %
HDLC SERPL-MCNC: 34 MG/DL (ref 40–60)
LDL CHOLESTEROL CALCULATED: ABNORMAL MG/DL
LDL CHOLESTEROL DIRECT: 145 MG/DL
MICROALBUMIN UR-MCNC: <1.2 MG/DL
MICROALBUMIN/CREAT UR-RTO: NORMAL MG/G (ref 0–30)
TRIGL SERPL-MCNC: 415 MG/DL (ref 0–150)
VLDLC SERPL CALC-MCNC: ABNORMAL MG/DL

## 2022-11-17 PROCEDURE — 3078F DIAST BP <80 MM HG: CPT

## 2022-11-17 PROCEDURE — 82570 ASSAY OF URINE CREATININE: CPT

## 2022-11-17 PROCEDURE — 3074F SYST BP LT 130 MM HG: CPT

## 2022-11-17 PROCEDURE — 83036 HEMOGLOBIN GLYCOSYLATED A1C: CPT

## 2022-11-17 PROCEDURE — 82043 UR ALBUMIN QUANTITATIVE: CPT

## 2022-11-17 PROCEDURE — 36415 COLL VENOUS BLD VENIPUNCTURE: CPT

## 2022-11-17 PROCEDURE — 83721 ASSAY OF BLOOD LIPOPROTEIN: CPT

## 2022-11-17 PROCEDURE — 99204 OFFICE O/P NEW MOD 45 MIN: CPT

## 2022-11-17 PROCEDURE — 3046F HEMOGLOBIN A1C LEVEL >9.0%: CPT

## 2022-11-17 PROCEDURE — 80061 LIPID PANEL: CPT

## 2022-11-17 RX ORDER — CYCLOBENZAPRINE HCL 10 MG
10 TABLET ORAL NIGHTLY
Qty: 30 TABLET | Refills: 2 | Status: CANCELLED | OUTPATIENT
Start: 2022-11-17

## 2022-11-17 RX ORDER — BLOOD SUGAR DIAGNOSTIC
STRIP MISCELLANEOUS
Qty: 100 EACH | Refills: 2 | Status: CANCELLED | OUTPATIENT
Start: 2022-11-17

## 2022-11-17 RX ORDER — SIMVASTATIN 10 MG
TABLET ORAL
COMMUNITY
Start: 2021-03-05 | End: 2022-11-17

## 2022-11-17 RX ORDER — METFORMIN HYDROCHLORIDE 500 MG/1
500 TABLET, EXTENDED RELEASE ORAL
Qty: 90 TABLET | Refills: 1 | Status: SHIPPED | OUTPATIENT
Start: 2022-11-17

## 2022-11-17 RX ORDER — ASPIRIN 81 MG/1
81 TABLET ORAL DAILY
Qty: 30 TABLET | Refills: 3 | Status: SHIPPED | OUTPATIENT
Start: 2022-11-17

## 2022-11-17 RX ORDER — ATORVASTATIN CALCIUM 40 MG/1
40 TABLET, FILM COATED ORAL DAILY
Qty: 90 TABLET | Refills: 1 | Status: SHIPPED | OUTPATIENT
Start: 2022-11-17

## 2022-11-17 RX ORDER — INSULIN GLARGINE 100 [IU]/ML
15 INJECTION, SOLUTION SUBCUTANEOUS 2 TIMES DAILY
Qty: 10 ML | Refills: 0 | Status: CANCELLED | OUTPATIENT
Start: 2022-11-17

## 2022-11-17 RX ORDER — BLOOD SUGAR DIAGNOSTIC
STRIP MISCELLANEOUS
COMMUNITY
Start: 2021-05-01

## 2022-11-17 RX ORDER — LISINOPRIL 2.5 MG/1
TABLET ORAL
COMMUNITY
Start: 2021-03-24 | End: 2022-11-17 | Stop reason: SDUPTHER

## 2022-11-17 RX ORDER — LISINOPRIL 5 MG/1
TABLET ORAL
Qty: 30 TABLET | Refills: 2 | Status: SHIPPED | OUTPATIENT
Start: 2022-11-17

## 2022-11-17 RX ORDER — CYCLOBENZAPRINE HCL 10 MG
10 TABLET ORAL 3 TIMES DAILY PRN
COMMUNITY
Start: 2021-03-04

## 2022-11-17 ASSESSMENT — ANXIETY QUESTIONNAIRES
4. TROUBLE RELAXING: 2
IF YOU CHECKED OFF ANY PROBLEMS ON THIS QUESTIONNAIRE, HOW DIFFICULT HAVE THESE PROBLEMS MADE IT FOR YOU TO DO YOUR WORK, TAKE CARE OF THINGS AT HOME, OR GET ALONG WITH OTHER PEOPLE: SOMEWHAT DIFFICULT
5. BEING SO RESTLESS THAT IT IS HARD TO SIT STILL: 0
1. FEELING NERVOUS, ANXIOUS, OR ON EDGE: 2
6. BECOMING EASILY ANNOYED OR IRRITABLE: 1
7. FEELING AFRAID AS IF SOMETHING AWFUL MIGHT HAPPEN: 0
2. NOT BEING ABLE TO STOP OR CONTROL WORRYING: 2
3. WORRYING TOO MUCH ABOUT DIFFERENT THINGS: 2
GAD7 TOTAL SCORE: 9

## 2022-11-17 ASSESSMENT — ENCOUNTER SYMPTOMS
BLOOD IN STOOL: 0
VOMITING: 0
NAUSEA: 1
COLOR CHANGE: 0
SHORTNESS OF BREATH: 0
ABDOMINAL PAIN: 1
COUGH: 0
EYE PAIN: 0
DIARRHEA: 1

## 2022-11-17 NOTE — PROGRESS NOTES
[address], [facility], [address] [facility] [address]        Phone: [phone]    The following is written by a medical student, please see below for resident/attending attestation and plan. Name:  Vicki Naranjo  :    1970    Consultants:   Patient Care Team:  Phyllis Thomas DO as PCP - General (Family Medicine)    Chief Complaint:     Vicki Naranjo is a 54 y.o. female  who presents today for an established patient care visit with Personalized Prevention Plan Services as noted below. HPI:      Vicik Naranjo is a 54year old female with history of T2DM, HTN, hyperlipidemia with history of TIA, class III obesity, and left leg abscess s/p debridement and I&D    Abril Ashley recently moved here from [place]. She is a full time caregiver for her , Wanda Salomon, who is wheelchair bound. T2DM  Class III Obesity  Diagnosed five years ago. She had been on metformin in the past, but stopped two years ago due to diarrhea. She was on insulin until running out after moving to [place]. She takes her blood sugars every morning, which has never been below 200, but without her insulin has been 300-350. Patient also endorses polyuria and polydipsia. She had a UTI a few months ago without return of symptoms. Her last eye exam was in  and she has never had a foot exam. She does have tingling in her feet. She tries to eat vegetables daily, occasional fruits. She avoids salt and carbohydrates when possible. Drinks six water bottles each day, one cup of coffee every other morning, and soda once a week. She takes a 20 minute walk every few days. HTN  Abril Ashley is tolerating lisinopril 2.5mg without side effects. Her blood pressure cuff broke so she hasn't been taking daily measurements.  She reports daily headaches, a \"whooshing\" sound in her left ear, worsening vision, and occasional dizziness. She denies blurry vision, epistaxis. History of TIAs  Hyperlipidemia  Patient is tolerating simvastatin 10mg and aspirin 81mg without side effects. She has had two TIAs int he past, one this year, one >10 years ago. Carotid duplex study on 10/14/22 showed plaques in left and right pICA with <50% diameter reducing stenosis. Per patient, she was seen by a surgeon in [place] who recommended stent placement, but they did not proceed with the procedure. Left Leg Abscess s/p debridement and I&D  Patient had left thigh abscess with I&D and debridement on 10/28/22 that is healing well, no oozing or pain. No longer needing bandages. LLQ mass  She has a mass in her left lower quadrant that started as golf ball size and has grown to softball size in the last couple of years. After eating, she has bulging of the mass and her pain can be 12/10. There are no skin color changes, nausea, or vomiting with these episodes. The pain makes it hard for her to sleep. She uses flexeril at night to help sleep. Has taken tramadol in the past.     Health Maintenance  She is not up to date on her mammogram, pap, or colonoscopy due to life circumstances but would like to have these done soon. She requires medical transport so would like to schedule these later. OB/GYN history  She is still having periods, but has noticed they have become heavier for the first 2-3 days. She endorses hot flashes, fatigue, and trouble sleeping lately.      Patient Active Problem List   Diagnosis    TIA (transient ischemic attack)    Class 3 severe obesity with serious comorbidity and body mass index (BMI) of 40.0 to 44.9 in adult [facility])    Type 2 diabetes mellitus without complication, with long-term current use of insulin (HCC)    Abscess of left thigh    Sepsis ([facility]    Tachycardia    Tachypnea    Neutrophilic leukocytosis    Hidradenitis suppurativa    Groin abscess    Teratoma of ovary, right Ventral hernia without obstruction or gangrene         Past Medical History:    Past Medical History:   Diagnosis Date    Diabetes mellitus ([address]     Headache     Hyperlipidemia     TIA (transient ischemic attack)     , 2017 per pt       Past Surgical History:  Past Surgical History:   Procedure Laterality Date     SECTION      CHOLECYSTECTOMY      LEG SURGERY Left 10/28/2022    LEFT VANI  DEBRIDEMENT INCISION AND DRAINAGE performed by Yenny Morales MD at [address] [place] [address]:  Prior to Visit Medications    Medication Sig Taking? Authorizing Provider   atorvastatin (LIPITOR) 40 MG tablet Take 1 tablet by mouth daily Yes María Roy DO   cyclobenzaprine (FLEXERIL) 10 MG tablet Take 10 mg by mouth 3 times daily as needed Yes Historical Provider, MD   metFORMIN (GLUCOPHAGE) 500 MG tablet TAKE 1 TABLET BY MOUTH RIGHT BEFORE LUNCH AND DINNER DAILY Yes Historical Provider, MD   simvastatin (ZOCOR) 10 MG tablet TAKE 1 TABLET BY MOUTH EVERYDAY AT BEDTIME Yes Historical Provider, MD   Insulin Syringe-Needle U-100 31G X 5/16\" 0.3 ML MISC TO INJECT INSULIN PER SLIDING SCALE.  Yes Historical Provider, MD   aspirin 81 MG EC tablet Take 1 tablet by mouth daily Yes Adaline Alonzo, DO   lisinopril (PRINIVIL;ZESTRIL) 5 MG tablet TAKE 1 TABLET BY MOUTH EVERY DAY Yes Adaline Alonzo, DO   blood glucose test strips (ASCENSIA AUTODISC VI;ONE TOUCH ULTRA TEST VI) strip TEST BLOOD SUGARS ONCE A DAY EVERY MORNING Yes Adaline Alonzo, DO   empagliflozin (JARDIANCE) 25 MG tablet Take 1 tablet by mouth daily Yes Adaline Alonzo, DO   metFORMIN (GLUCOPHAGE-XR) 500 MG extended release tablet Take 1 tablet by mouth daily (with breakfast) Yes María Roy DO   Dulaglutide 0.75 MG/0.5ML SOPN Inject 0.75 mg into the skin once a week for 4 doses Yes María Roy DO   insulin glargine (LANTUS) 100 UNIT/ML injection vial Inject 15 Units into the skin 2 times daily Yes Carolina Damon MD   insulin regular Size: Large Adult   Pulse: 80   Resp: 18   Temp: 97 °F (36.1 °C)   TempSrc: Temporal   SpO2: 96%   Weight: 233 lb 12.8 oz (106.1 kg)   Height: 4' 11.25\" (1.505 m)     Body mass index is 46.82 kg/m². Wt Readings from Last 3 Encounters:   11/17/22 233 lb 12.8 oz (106.1 kg)   11/16/22 233 lb 6.4 oz (105.9 kg)   10/28/22 242 lb 1 oz (109.8 kg)       BP Readings from Last 3 Encounters:   11/17/22 132/74   11/16/22 134/78   10/29/22 (!) 104/57       Physical Exam  Constitutional:       Appearance: Normal appearance. HENT:      Head: Normocephalic. Eyes:      Conjunctiva/sclera: Conjunctivae normal.   Cardiovascular:      Rate and Rhythm: Normal rate and regular rhythm. Heart sounds: Normal heart sounds. No murmur heard. No friction rub. No gallop. Pulmonary:      Effort: Pulmonary effort is normal.      Breath sounds: Normal breath sounds. No wheezing, rhonchi or rales. Abdominal:      General: Bowel sounds are normal.      Palpations: Abdomen is soft. Tenderness: There is abdominal tenderness. Comments: Unable to appreciate hernia in left lower quadrant, but there is TTP    Musculoskeletal:         General: No swelling. Feet:      Right foot:      Protective Sensation: 8 sites tested. 8 sites sensed. Skin integrity: Dry skin present. No ulcer. Toenail Condition: Right toenails are abnormally thick. Left foot:      Protective Sensation: 8 sites tested. 8 sites sensed. Skin integrity: Dry skin present. No ulcer. Toenail Condition: Left toenails are abnormally thick. Neurological:      Mental Status: She is alert.        Lab Review:   Admission on 10/27/2022, Discharged on 10/29/2022   Component Date Value    WBC 10/28/2022 13.3 (A)     RBC 10/28/2022 5.03     Hemoglobin 10/28/2022 13.3     Hematocrit 10/28/2022 40.3     MCV 10/28/2022 80.1     MCH 10/28/2022 26.5     MCHC 10/28/2022 33.0     RDW 10/28/2022 15.0     Platelets 22/90/2243 387     MPV 10/28/2022 7.4 Neutrophils % 10/28/2022 74.0     Lymphocytes % 10/28/2022 18.0     Monocytes % 10/28/2022 7.2     Eosinophils % 10/28/2022 0.4     Basophils % 10/28/2022 0.4     Neutrophils Absolute 10/28/2022 9.8 (A)     Lymphocytes Absolute 10/28/2022 2.4     Monocytes Absolute 10/28/2022 1.0     Eosinophils Absolute 10/28/2022 0.1     Basophils Absolute 10/28/2022 0.1     Sodium 10/28/2022 132 (A)     Potassium reflex Magnesi* 10/28/2022 3.8     Chloride 10/28/2022 97 (A)     CO2 10/28/2022 23     Anion Gap 10/28/2022 12     Glucose 10/28/2022 338 (A)     BUN 10/28/2022 10     Creatinine 10/28/2022 0.7     Est, Glom Filt Rate 10/28/2022 >60     Calcium 10/28/2022 9.3     Total Protein 10/28/2022 7.9     Albumin 10/28/2022 4.0     Albumin/Globulin Ratio 10/28/2022 1.0 (A)     Total Bilirubin 10/28/2022 0.4     Alkaline Phosphatase 10/28/2022 126     ALT 10/28/2022 13     AST 10/28/2022 9 (A)     Ventricular Rate 10/28/2022 111     Atrial Rate 10/28/2022 111     P-R Interval 10/28/2022 128     QRS Duration 10/28/2022 78     Q-T Interval 10/28/2022 334     QTc Calculation (Bazett) 10/28/2022 454     P Axis 10/28/2022 50     R Axis 10/28/2022 -51     T Axis 10/28/2022 57     Diagnosis 10/28/2022                      Value:Sinus tachycardiaLeft anterior fascicular blockCannot rule out Anterior infarct , age undeterminedAbnormal ECGWhen compared with ECG of 14-OCT-2022 05:56,Vent.  rate has increased BY  46 BPMT wave inversion no longer evident in Inferior leadsT wave inversion no longer evident in Anterior leadsConfirmed by [address] [place] [address] ([facility]) on 10/28/2022 6:22:48 PM      Procalcitonin 10/28/2022 0.07     SARS-CoV-2, NAAT 10/28/2022 Not Detected     Troponin 10/28/2022 <0.01     Lipase 10/28/2022 25.0     pH, Wilfrido 10/28/2022 7.418     pCO2, Wilfrido 10/28/2022 37.5 (A)     pO2, Wilfrido 10/28/2022 41.6 (A)     HCO3, Venous 10/28/2022 23.7     Base Excess, Wilfrido 10/28/2022 -0.5     O2 Sat, Wilfrido 10/28/2022 78     Carboxyhemoglobin 10/28/2022 1.9 (A)     MetHgb, Wilfrido 10/28/2022 0.3     TC02 (Calc), Wilfrido 10/28/2022 25     O2 Therapy 10/28/2022 Unknown     Beta-Hydroxybutyrate 10/28/2022 0.08     Color, UA 10/28/2022 Yellow     Clarity, UA 10/28/2022 Clear     Glucose, Ur 10/28/2022 >=1000 (A)     Bilirubin Urine 10/28/2022 Negative     Ketones, Urine 10/28/2022 Negative     Specific Gravity, UA 10/28/2022 1.015     Blood, Urine 10/28/2022 Negative     pH, UA 10/28/2022 6.0     Protein, UA 10/28/2022 Negative     Urobilinogen, Urine 10/28/2022 0.2     Nitrite, Urine 10/28/2022 Negative     Leukocyte Esterase, Urine 10/28/2022 Negative     Microscopic Examination 10/28/2022 Not Indicated     Urine Type 10/28/2022 NotGiven     Urine Reflex to Culture 10/28/2022 Not Indicated     HCG(Urine) Pregnancy Test 10/28/2022 Negative     Lactic Acid 10/28/2022 1.7     Blood Culture, Routine 10/28/2022 No Growth after 4 days of incubation. Culture, Blood 2 10/28/2022 No Growth after 4 days of incubation. POC Glucose 10/28/2022 253 (A)     Performed on 10/28/2022 ACCU-CHEK     POC Glucose 10/28/2022 265 (A)     Performed on 10/28/2022 ACCU-CHEK     Fungus (Mycology) Culture 10/28/2022                      Value:No growth after 1 week/s of incubation. No growth after 2 week/s of incubation. Fungus Stain 10/28/2022 No Fungal elements seen     Gram Stain Result 10/28/2022  (A)                     Value:3+ WBC's (Polymorphonuclear)  2+ Gram positive cocci  2+ Gram negative rods      Organism 10/28/2022 Strep agalactiae (Beta Strep Group B) (A)     Culture Surgical 10/28/2022                      Value:Heavy growth  Susceptibility testing of penicillin and other beta lactams is  not necessary for beta hemolytic Streptococci since resistant  strains have not been identified.  (CLSI M100)      Organism 10/28/2022 Prevotella bivia (A)     Anaerobic Culture 10/28/2022 Heavy growth     AFB Culture (Mycobacteri* 10/28/2022                      Value:No growth after 1 week/s of incubation. No growth after 2 week/s of incubation.       AFB Smear 10/28/2022 No AFB observed by Fluorescent stain     POC Glucose 10/28/2022 241 (A)     Performed on 10/28/2022 ACCU-CHEK     Sodium 10/29/2022 136     Potassium reflex Magnesi* 10/29/2022 3.6     Chloride 10/29/2022 103     CO2 10/29/2022 22     Anion Gap 10/29/2022 11     Glucose 10/29/2022 241 (A)     BUN 10/29/2022 7     Creatinine 10/29/2022 <0.5 (A)     Est, Glom Filt Rate 10/29/2022 >60     Calcium 10/29/2022 8.4     WBC 10/29/2022 11.1 (A)     RBC 10/29/2022 4.46     Hemoglobin 10/29/2022 11.9 (A)     Hematocrit 10/29/2022 35.8 (A)     MCV 10/29/2022 80.4     MCH 10/29/2022 26.6     MCHC 10/29/2022 33.1     RDW 10/29/2022 14.9     Platelets 94/44/3784 347     MPV 10/29/2022 7.6     Neutrophils % 10/29/2022 68.4     Lymphocytes % 10/29/2022 21.7     Monocytes % 10/29/2022 7.4     Eosinophils % 10/29/2022 2.1     Basophils % 10/29/2022 0.4     Neutrophils Absolute 10/29/2022 7.6     Lymphocytes Absolute 10/29/2022 2.4     Monocytes Absolute 10/29/2022 0.8     Eosinophils Absolute 10/29/2022 0.2     Basophils Absolute 10/29/2022 0.0     POC Glucose 10/28/2022 174 (A)     Performed on 10/28/2022 ACCU-CHEK     POC Glucose 10/29/2022 238 (A)     Performed on 10/29/2022 ACCU-CHEK     POC Glucose 10/29/2022 214 (A)     Performed on 10/29/2022 ACCU-CHEK     POC Glucose 10/29/2022 249 (A)     Performed on 10/29/2022 ACCU-CHEK    Admission on 10/13/2022, Discharged on 10/14/2022   Component Date Value    Glucose 10/14/2022 264     QC OK? 10/14/2022 yes     WBC 10/13/2022 10.1     RBC 10/13/2022 5.00     Hemoglobin 10/13/2022 13.3     Hematocrit 10/13/2022 40.1     MCV 10/13/2022 80.2     MCH 10/13/2022 26.6     MCHC 10/13/2022 33.2     RDW 10/13/2022 14.8     Platelets 46/22/9602 401     MPV 10/13/2022 7.6     Neutrophils % 10/13/2022 62.3     Lymphocytes % 10/13/2022 28.4     Monocytes % 10/13/2022 7.0     Eosinophils % 10/13/2022 1.7 Basophils % 10/13/2022 0.6     Neutrophils Absolute 10/13/2022 6.3     Lymphocytes Absolute 10/13/2022 2.9     Monocytes Absolute 10/13/2022 0.7     Eosinophils Absolute 10/13/2022 0.2     Basophils Absolute 10/13/2022 0.1     Sodium 10/13/2022 132 (A)     Potassium reflex Magnesi* 10/13/2022 4.2     Chloride 10/13/2022 93 (A)     CO2 10/13/2022 22     Anion Gap 10/13/2022 17 (A)     Glucose 10/13/2022 377 (A)     BUN 10/13/2022 12     Creatinine 10/13/2022 0.7     GFR Non- 10/13/2022 >60     GFR  10/13/2022 >60     Calcium 10/13/2022 9.6     Total Protein 10/13/2022 8.0     Albumin 10/13/2022 3.9     Albumin/Globulin Ratio 10/13/2022 1.0 (A)     Total Bilirubin 10/13/2022 <0.2     Alkaline Phosphatase 10/13/2022 120     ALT 10/13/2022 13     AST 10/13/2022 23     Troponin 10/13/2022 <0.01     Specimen Status 10/13/2022 JOSIE     Ventricular Rate 10/14/2022 65     Atrial Rate 10/14/2022 65     P-R Interval 10/14/2022 126     QRS Duration 10/14/2022 88     Q-T Interval 10/14/2022 458     QTc Calculation (Bazett) 10/14/2022 476     P Axis 10/14/2022 47     R Axis 10/14/2022 -45     T Axis 10/14/2022 -29     Diagnosis 10/14/2022                      Value:Normal sinus rhythm with sinus arrhythmiaLeft axis deviationT wave abnormality, consider inferior ischemiaT wave abnormality, consider anterolateral ischemiaProlonged QTAbnormal ECGWhen compared with ECG of 10-SEP-2022 05:22,T wave inversion now evident in Inferior leadsT wave inversion now evident in Lateral leadsConfirmed by [address], [address] ([id number]) on 10/14/2022 2:16:25 PM      POC Glucose 10/14/2022 264 (A)     Performed on 10/14/2022 ACCU-CHEK     POC Glucose 10/14/2022 204 (A)     Performed on 10/14/2022 ACCU-CHEK     POC Glucose 10/14/2022 219 (A)     Performed on 10/14/2022 ACCU-CHEK           Assessment/Plan:    Matt Sanabria is a 54year old female with PMH of T2DM, HLD with history of TIA, HTN, obesity, left leg abscess who presents today to establish care    T2DM  - uncontrolled, POC A1C 10.6% today  - switch metformin to XR 500mg   - stop insulin  - start jardiance 25mg pill - take 12.5mg for the first week, then increase to 25mg daily. Discussed the risks and benefits of starting today including cleaning of vulva after urination and signs/symptoms of UTI or yeast infection. Patient voiced understanding  - start Trulicity 2.46TZ once weekly. If tolerating in one month, increase to 1.5mg. Discussed the risks and benefits of starting today including GI side effects. Patient voiced understanding.  - urine micro, and A1C ordered today  - diabetic foot exam normal today  - Last diabetic eye exam 2022, record release signed today  - RTC 2 months for med check and lab review      Hyperlipidemia, History of TIAs  - no lipid panel on file  - stop simvastatin and start atorvastatin 40mg  - continue aspirin 81mg daily  - lipid panel ordered today  - unable to calculate patient's ASCVD risk score due to no lipid panel  - referral to vascular today for evaluation of bilateral proximal ICA plaques  - RTC 4-6 for med check and lab review     HTN  - uncontrolled per EUGENIO guidelines, /78 today  - Increase lisinopril to 5 mg  - recommend home blood pressure readings. - urine micro ordered today  - Recommend diet and lifestyle modifications including DASH diet and 150 minutes of exercise weekly  - RTC 4-6 for med check and lab review     Left lower abdominal hernia  Chronic pain  - symptomatic, found on CT abdomen 10/28/2022  - she has been on flexeril and tramadol in the past for pain, but ran out of these prescriptions. We will hold off on a new prescription until her evaluation from GI  - referral to GI for further evaluation    Class III Obesity   - recommend diet and lifestyle modifications as above    Left Leg Abscess s/p debridement and I&D  - patient had left thigh abscess with I&D and debridement on 10/28/22.  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 Record release signed. Screening mammogram for breast cancer  -     LUPE DIGITAL SCREEN W OR WO CAD BILATERAL; Future    Screening for colon cancer  -     AFL - Naseem Pickering MD, Gastroenterology, [facility]    Ventral hernia without obstruction or gangrene  -     Court Jain MD, General Surgery, [facility]    Teratoma of ovary, right  -     [facility]    Other orders  -     atorvastatin (LIPITOR) 40 MG tablet; Take 1 tablet by mouth daily  -     aspirin 81 MG EC tablet; Take 1 tablet by mouth daily  -     lisinopril (PRINIVIL;ZESTRIL) 5 MG tablet; TAKE 1 TABLET BY MOUTH EVERY DAY  -     blood glucose test strips (ASCENSIA AUTODISC VI;ONE TOUCH ULTRA TEST VI) strip; TEST BLOOD SUGARS ONCE A DAY EVERY MORNING  -     empagliflozin (JARDIANCE) 25 MG tablet; Take 1 tablet by mouth daily  -     metFORMIN (GLUCOPHAGE-XR) 500 MG extended release tablet;  Take 1 tablet by mouth daily (with breakfast)  -     Dulaglutide 0.75 MG/0.5ML SOPN; Inject 0.75 mg into the skin once a week for 4 doses  Health Maintenance Due:  Health Maintenance Due   Topic Date Due    COVID-19 Vaccine (1) Never done    Pneumococcal 0-64 years Vaccine (1 - PCV) Never done    Lipids  Never done    HIV screen  Never done    Diabetic microalbuminuria test  Never done    Diabetic retinal exam  Never done    Hepatitis C screen  Never done    Hepatitis B vaccine (1 of 3 - Risk 3-dose series) Never done    DTaP/Tdap/Td vaccine (1 - Tdap) Never done    Cervical cancer screen Never done    Colorectal Cancer Screen  Never done    Flu vaccine (1) Never done     Health Maintenance: (USPSTF Recommendations)  (F) Breast Cancer Screen: (40-49 (C), 50-74 biennial screening mammogram (B))  (F) Cervical Cancer Screen: (21-29 q3yr cytology alone; 30-65 q3yr cytology alone, q5yr with hrHPV alone, or q5yr cytology+hrHPV (A))  CRC/Colonoscopy Screening: (adults 45-49 (B), 50-75 (A))  Lung Ca Screening: Annual LDCT (+smoker age 49-80, smoked within 15 years, total of 20 pack yr history (B)):  DEXA Screen: (women >65 and older, <65 if at risk/postmenopausal (B))  HIV Screen: (16-65 yr old, and all pregnant patients (A)): Hep C Screen: (18-79 yr old (B)):  HCC Screen: (all pts with cirrhosis and high risk Hep B (US q6 mo)):  Immunizations:    RTC:  Return in about 2 months (around 1/17/2023) for chronic condition f/u. RESIDENT/ATTENDING ATTESTATION:    After medical student evaluation and exam, I independently gathered patients history, independently did a physical, and agree with A/P as written in medical student's note (other than clarified below). Please see below for additional information documented by the resident/attending including the A/P. Assessment/Plan:      Yan Fajardo is a 54year old female with PMH of T2DM, HLD with history of TIA, HTN, obesity, prior left leg abscess s/p I&D who presents today to establish care    T2DM  -Uncontrolled, POC A1c 10.6% today  -Start metformin  mg daily  -Start empagliflozin 25 mg p.o. daily, start with half tablet for the first 7 days, then increase to full dose. Discussed risks and benefits of medication, including side effects and counseled on maintaining proper hygiene to prevent UTI or yeast infection, as medication causes increased excretion of glucose in the urine. Patient verbalized understanding and agreeable to plan. -Start dulaglutide 0.75 mg injection once weekly.   If tolerating well in 1 month, plan to increase to 1.5 mg.  Discussed risks and benefits, including common GI side effects. Patient verbalizes understanding and agreeable to plan. -Discontinue insulin use for now, can reconsider starting in the future if uncontrolled blood sugars with new medication regimen  -Ordered labs: Urine microalbumin, A1c  -Diabetic foot exam completed today  -Diabetic eye exam completed 2022, release of records signed today  -Follow-up in 2 months for medication check and symptomatic assessment    Hyperlipidemia  -Discontinue simvastatin 10 mg daily  -Start atorvastatin 40 mg daily, due to history of TIA patient will benefit from moderate to high intensity statin therapy (AAFP, 2017)  -Ordered lipid panel today  -follow up in 2 months    Lab Results   Component Value Date    CHOL 231 (H) 11/17/2022    TRIG 415 (H) 11/17/2022    HDL 34 (L) 11/17/2022    LDLCALC see below 11/17/2022    LABVLDL see below 11/17/2022       The 10-year ASCVD risk score (Eliot ELENA, et al., 2019) is: 5.3%    Values used to calculate the score:      Age: 54 years      Sex: Female      Is Non- : No      Diabetic: Yes      Tobacco smoker: No      Systolic Blood Pressure: 881 mmHg      Is BP treated: No      HDL Cholesterol: 34 mg/dL      Total Cholesterol: 231 mg/dL    History of TIAs  -continue aspirin 81 mg daily  -start atorvastatin 40 mg daily  -referral to vascular surgeon today for evaluation and management of bilateral proximal ICA plaques and stenosis     Results for orders placed during the hospital encounter of 10/13/22    VL DUP CAROTID BILATERAL    Narrative  Carotid Duplex Study    Demographics    Patient Name       Artemus Boxer    Date of Study      10/14/2022         Gender              Female    Impressions  Right Impression  The right internal carotid artery reveals a <50% diameter reducing stenosis. The right vertebral artery demonstrates normal antegrade flow.   The right subclavian artery is visualized and demonstrates multiphasic flow. Left Impression  The left internal carotid artery reveals a <50% diameter reducing stenosis. The left vertebral artery demonstrates normal antegrade flow. The left subclavian artery is visualized and demonstrates multiphasic flow. There were no previous studies to use for comparison. Conclusions    Summary    The bilateral internal carotid arteries reveal a <50% diameter reducing  stenosis. The bilateral vertebral arteries demonstrate normal antegrade flow. Hypertension  - uncontrolled per EUGENIO guidelines, /78 today  - Increase lisinopril to 5 mg  - recommend home blood pressure readings with bicep cuff  - discussed dietary and lifestyle modifications including DASH diet and 150 minutes of moderate-high intensity exercise weekly  - urine microalbumin ordered today    Lab Results   Component Value Date/Time    LABMICR <1.20 11/17/2022 11:46 AM    LABMICR Not Indicated 10/28/2022 01:33 AM        Left lower abdominal hernia  Chronic pain  - symptomatic, found on CT abdomen  -patient was taking cyclobenzaprine 10 mg TID for pain, also reports past tramadol use for abdominal pain  -will defer pain management to surgery after evaluation  -referral to general surgery placed    Results for orders placed during the hospital encounter of 10/27/22    CT ABDOMEN PELVIS WO CONTRAST    Narrative  EXAMINATION:  CT OF THE ABDOMEN AND PELVIS WITHOUT CONTRAST 10/28/2022 2:04 am    Impression  1. Fat stranding and inflammation centered in the medial upper left thigh  bordering the perineum. There is minimal stranding in the labia and left  inguinal canal but not the primary site. There may be a small phlegmon just  under the skin in the medial left upper thigh. There is no abscess at the  labia or inguinal canal.    2.  No fat stranding or inflammation within the internal abdomen, pelvis, and  retroperitoneum.     3.  Fat containing ventral upper abdominal hernia and left lower abdominal  hernia without acute complication. 4.  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 Class III Obesity   - recommend diet and lifestyle modifications as above  - patient reports improving diet however not at goal due to life stressors. Discussed achieving optimal BMI to proceed with surgical management of her comorbid conditions, ideally <40. She communicates understanding and desire to continue weight loss. Left Leg Abscess s/p debridement and I&D  - patient had left thigh abscess with I&D and debridement on 10/28/22.  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 Record release signed. Screening mammogram for breast cancer  -     LUPE DIGITAL SCREEN W OR WO CAD BILATERAL; Future    Screening for colon cancer  -     AFL - Akil Pritchard MD, Gastroenterology, [facility]    Ventral hernia without obstruction or gangrene  -     Willie Fonseca MD, General Surgery, [facility]    Teratoma of ovary, right  -     [facility] Ob[facility]etrics [facility]ne[facility]    Other orders  -     atorvastatin (LIPITOR) 40 MG tablet; Take 1 tablet by mouth daily  -     aspirin 81 MG EC tablet; Take 1 tablet by mouth daily  -     lisinopril (PRINIVIL;ZESTRIL) 5 MG tablet; TAKE 1 TABLET BY MOUTH EVERY DAY  -     blood glucose test strips (ASCENSIA AUTODISC VI;ONE TOUCH ULTRA TEST VI) strip; TEST BLOOD SUGARS ONCE A DAY EVERY MORNING  -     empagliflozin (JARDIANCE) 25 MG tablet;  Take 1 tablet by mouth daily  -     metFORMIN (GLUCOPHAGE-XR) 500 MG extended release tablet; Take 1 tablet by mouth daily (with breakfast)  -     Dulaglutide 0.75 MG/0.5ML SOPN; Inject 0.75 mg into the skin once a week for 4 doses      EMR Dragon/transcription disclaimer:  Much of this encounter note is electronic transcription/translation of spoken language to printed texts. The electronic translation of spoken language may be erroneous, or at times, nonsensical words or phrases may be inadvertently transcribed.   Although I have reviewed the note for such errors, some may still exist.         Chen Sullivan DO, PGY-1   [facility] Medicine Residency Program

## 2022-11-17 NOTE — TELEPHONE ENCOUNTER
Tramadol     Pt was here for her appt this morning and discussed tramadol being sent in as the Dr. Mckinney Ax her off of naproxen. Pt is calling because the tramadol has not been sent to the pharmacy. Please call Pt with updates.

## 2022-11-18 LAB
ESTIMATED AVERAGE GLUCOSE: 260.4 MG/DL
HBA1C MFR BLD: 10.7 %

## 2022-11-18 NOTE — TELEPHONE ENCOUNTER
Pt was informed of Dr. Angella Gordillo message.  Pt stated she hasn't made the appointments yet for the general surgeon, she just received referrals yesterday

## 2022-12-06 ENCOUNTER — HOSPITAL ENCOUNTER (EMERGENCY)
Age: 52
Discharge: LEFT BEFORE DISCHARGE | End: 2022-12-06
Payer: MEDICAID

## 2022-12-06 ENCOUNTER — HOSPITAL ENCOUNTER (OUTPATIENT)
Dept: WOMENS IMAGING | Age: 52
Discharge: HOME | End: 2022-12-06
Payer: MEDICAID

## 2022-12-06 VITALS
WEIGHT: 242 LBS | DIASTOLIC BLOOD PRESSURE: 58 MMHG | OXYGEN SATURATION: 96 % | RESPIRATION RATE: 22 BRPM | BODY MASS INDEX: 45.69 KG/M2 | HEART RATE: 89 BPM | HEIGHT: 61 IN | SYSTOLIC BLOOD PRESSURE: 122 MMHG | TEMPERATURE: 98.1 F

## 2022-12-06 DIAGNOSIS — Z12.31 SCREENING MAMMOGRAM FOR BREAST CANCER: ICD-10-CM

## 2022-12-06 PROCEDURE — 93005 ELECTROCARDIOGRAM TRACING: CPT | Performed by: EMERGENCY MEDICINE

## 2022-12-06 PROCEDURE — 77063 BREAST TOMOSYNTHESIS BI: CPT

## 2022-12-06 ASSESSMENT — PAIN DESCRIPTION - FREQUENCY: FREQUENCY: CONTINUOUS

## 2022-12-06 ASSESSMENT — LIFESTYLE VARIABLES
HOW OFTEN DO YOU HAVE A DRINK CONTAINING ALCOHOL: NEVER
HOW MANY STANDARD DRINKS CONTAINING ALCOHOL DO YOU HAVE ON A TYPICAL DAY: PATIENT DOES NOT DRINK

## 2022-12-06 ASSESSMENT — PAIN DESCRIPTION - PAIN TYPE: TYPE: ACUTE PAIN

## 2022-12-06 ASSESSMENT — PAIN DESCRIPTION - LOCATION: LOCATION: CHEST

## 2022-12-06 ASSESSMENT — PAIN DESCRIPTION - DESCRIPTORS: DESCRIPTORS: TIGHTNESS

## 2022-12-06 ASSESSMENT — PAIN SCALES - GENERAL: PAINLEVEL_OUTOF10: 6

## 2022-12-07 LAB
EKG ATRIAL RATE: 97 BPM
EKG DIAGNOSIS: NORMAL
EKG P AXIS: 70 DEGREES
EKG P-R INTERVAL: 128 MS
EKG Q-T INTERVAL: 392 MS
EKG QRS DURATION: 82 MS
EKG QTC CALCULATION (BAZETT): 497 MS
EKG R AXIS: -61 DEGREES
EKG T AXIS: 71 DEGREES
EKG VENTRICULAR RATE: 97 BPM

## 2022-12-07 PROCEDURE — 93010 ELECTROCARDIOGRAM REPORT: CPT | Performed by: INTERNAL MEDICINE

## 2022-12-12 RX ORDER — LISINOPRIL 5 MG/1
TABLET ORAL
Qty: 30 TABLET | Refills: 2 | Status: SHIPPED | OUTPATIENT
Start: 2022-12-12

## 2022-12-12 NOTE — TELEPHONE ENCOUNTER
Refill Request       Last Seen: Last Seen Department: 11/17/2022  Last Seen by PCP: 11/17/2022    Last Written: 11/17/2022    Next Appointment:   Future Appointments   Date Time Provider Sekou Busch   12/13/2022  1:30 PM Patricia Gagnon MD AND GEN & LA MMA   12/13/2022  3:30 PM Sohan Mccullough DO [facility] AND RES MMA   1/16/2023 10:30 AM ECHO ONLY ROOM 1 - [facility]   1/19/2023 12:30 PM María Rosales, DO [facility] AND RES MMA   2/1/2023  1:00 PM DO [facility]   2/1/2023  1:30 PM [facility]  RM 1 [facility] OB/[facility]             Requested Prescriptions     Pending Prescriptions Disp Refills    lisinopril (PRINIVIL;ZESTRIL) 5 MG tablet [Pharmacy Med Name: LISINOPRIL 5 MG TABLET] 30 tablet 2     Sig: TAKE 1 TABLET BY MOUTH EVERY DAY

## 2022-12-12 NOTE — PROGRESS NOTES
[address] and [facility]                                             [address] [facility], [address] [place], [place] [address] [address]        Phone: [phone]      Name:  Michelle Lua  :    1970    Consultants:   Patient Care Team:  Joao Abdullahi DO as PCP - General (Family Medicine)    Chief Complaint:     Michelle Lua is a 50 y.o. female  who presents today for an established patient care visit with Personalized Prevention Plan Services as noted below. HPI:     Michelle Lua is a 54year old female with history of T2DM, HTN, hyperlipidemia with history of TIA, class III obesity, LLQ mass and left leg abscess s/p debridement and I&D.  She is here for post-ED visit follow-up    ED follow-up   -patient reported chest pain and SOA at her general surgery consult appointment and was sent to ED  -EKG obtained revealing normal sinus  -patient left without being seen    Acute cough  -Patient reports nightly coughing when lying down which leads to vomiting  -endorses several episodes of emesis nightly, improved with sitting up  -she has tried OTC cough syrup with minimal improvement  -denies hematemesis, chest pain, SOA    Left knee pain  -endorses instability with walking and clicking and grinding with ambulation, which has been exacerbated over the last few weeks  -she has had chronic knee pain in the past with prior corticosteroid injection that has provided a few months of relief  -taken tylenol with improvement and applying heat also helps    HTN  -prescribed lisinopril 5 mg at last visit, well tolerated  -denies headache, some orthostatic dizziness, some increased blurriness in vision  -some lightheadedness but improved from previously    T2DM  -started metformin  mg daily, empagliflozin 25 mg daily  -dulaglutide 0.75 mg injection 1x weekly at last visit  made her extremely nauseous and vomiting, she discontinued after two doses  -her blood sugars have improved, reports 100-125 with home glucometer checks  -microalbumin / creatinine WNL 11/17/22  -diabetic foot exam completed  -due for diabetic eye exam  Lab Results   Component Value Date    LABA1C 10.7 11/17/2022    LABA1C 10.6 11/17/2022     Hyperlipidemia  -started atorvastatin 40 mg daily at last visit  -denies muscle pain or GI side effects  -The 10-year ASCVD risk score (Eliot ELENA, et al., 2019) is: 6%    Values used to calculate the score:      Age: 50 years      Sex: Female      Is Non- : No      Diabetic: Yes      Tobacco smoker: No      Systolic Blood Pressure: 014 mmHg      Is BP treated: Yes      HDL Cholesterol: 34 mg/dL      Total Cholesterol: 231 mg/dL    Lab Results   Component Value Date    CHOL 231 (H) 11/17/2022    TRIG 415 (H) 11/17/2022    HDL 34 (L) 11/17/2022    LDLCALC see below 11/17/2022    LABVLDL see below 11/17/2022     History of TIAs  -patient taking aspirin 81 mg daily  -patient taking atorvastatin 40 mg daily  -10/14/22 carotid duplex study showing left and right pICA plaques with <50% stenosis  -f/u scheduled with vascular surgery 12/9 but she is rescheduling    LLQ mass  -patient endorsed softball size LLQ mass that has increased in last few years  -was previously taking flexeril at night to sleep for back pain  -difficulty sleeping due to pain  -follow-up with general surgery completed earlier today, Surgery is planned for Feb 2.  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)    Type 2 diabetes mellitus without complication, with long-term current use of insulin (HCC)    Abscess of left thigh    Sepsis ([address]    Tachycardia    Tachypnea    Neutrophilic leukocytosis    Hidradenitis suppurativa    Groin abscess    Teratoma of ovary, right    Ventral hernia without obstruction or gangrene    Hyperlipidemia    History of TIA (transient ischemic attack)    Internal carotid artery stenosis, bilateral    Primary hypertension         Past Medical History:    Past Medical History:   Diagnosis Date    Diabetes mellitus ([facility]     Headache     Hyperlipidemia     TIA (transient ischemic attack)     , 2017 per pt       Past Surgical History:  Past Surgical History:   Procedure Laterality Date     SECTION      CHOLECYSTECTOMY      LEG SURGERY Left 10/28/2022    LEFT VANI  DEBRIDEMENT INCISION AND DRAINAGE performed by Gabino Hamm MD at [address] [place] [address]:  Prior to Visit Medications    Medication Sig Taking? Authorizing Provider   traMADol (ULTRAM) 50 MG tablet Take 1 tablet by mouth every 6 hours as needed for Pain for up to 7 days. Intended supply: 7 days.  Take lowest dose possible to manage pain Yes Arun Burrows,    omeprazole (PRILOSEC) 40 MG delayed release capsule Take 1 capsule by mouth every morning (before breakfast) Yes María Roy DO   diclofenac (VOLTAREN) 75 MG EC tablet Take 1 tablet by mouth 2 times daily Yes María Roy DO   lisinopril (PRINIVIL;ZESTRIL) 5 MG tablet TAKE 1 TABLET BY MOUTH EVERY DAY Yes María Roy DO   atorvastatin (LIPITOR) 40 MG tablet Take 1 tablet by mouth daily Yes María oRy DO   cyclobenzaprine (FLEXERIL) 10 MG tablet Take 10 mg by mouth 3 times daily as needed Yes Historical Provider, MD   aspirin 81 MG EC tablet Take 1 tablet by mouth daily Yes Blanchie Scale, DO   blood glucose test strips (ASCENSIA AUTODISC VI;ONE TOUCH ULTRA TEST VI) strip TEST BLOOD SUGARS ONCE A DAY EVERY MORNING Yes Blanchie Scale, DO   empagliflozin (JARDIANCE) 25 MG tablet Take 1 tablet by mouth daily Yes Blanchie Scale, DO   metFORMIN (GLUCOPHAGE-XR) 500 MG extended release tablet Take 1 tablet by mouth daily (with breakfast) Yes María Roy, DO       Allergies: Iv contrast [iodides]    Family History:       Problem Relation Age of Onset    Diabetes Mother     Diabetes Brother     Diabetes Brother     Breast Cancer Paternal Grandmother     Heart Disease Paternal Grandfather          Health Maintenance Completed:  Health Maintenance   Topic Date Due    COVID-19 Vaccine (1) Never done    Pneumococcal 0-64 years Vaccine (1 - PCV) Never done    HIV screen  Never done    Diabetic retinal exam  Never done    Hepatitis C screen  Never done    Hepatitis B vaccine (1 of 3 - Risk 3-dose series) Never done    DTaP/Tdap/Td vaccine (1 - Tdap) Never done    Cervical cancer screen  Never done    Colorectal Cancer Screen  Never done    Flu vaccine (1) Never done    A1C test (Diabetic or Prediabetic)  02/17/2023    Depression Screen  11/16/2023    Diabetic foot exam  11/17/2023    Diabetic microalbuminuria test  11/17/2023    Lipids  11/17/2023    Breast cancer screen  12/06/2024    Hepatitis A vaccine  Aged Out    Hib vaccine  Aged Out    Meningococcal (ACWY) vaccine  Aged Out            There is no immunization history on file for this patient. Review of Systems:  Review of Systems   Constitutional:  Negative for chills, fatigue and fever. HENT:  Negative for congestion, rhinorrhea and sore throat. Eyes:  Negative for photophobia and visual disturbance. Respiratory:  Positive for cough. Negative for chest tightness, shortness of breath and wheezing.     Cardiovascular: Negative for chest pain and palpitations. Gastrointestinal:  Positive for abdominal pain and vomiting. Negative for diarrhea and nausea. Genitourinary: Negative. Musculoskeletal:  Positive for arthralgias. Neurological:  Positive for light-headedness. Negative for dizziness, weakness and headaches. Psychiatric/Behavioral:  Negative for behavioral problems and dysphoric mood. Physical Exam:   Vitals:    12/13/22 1451   BP: 118/72   Site: Left Upper Arm   Position: Sitting   Cuff Size: Large Adult   Pulse: 89   Resp: 18   Temp: 97.2 °F (36.2 °C)   TempSrc: Temporal   SpO2: 97%   Weight: 225 lb 6.4 oz (102.2 kg)   Height: 5' (1.524 m)     Body mass index is 44.02 kg/m². Wt Readings from Last 3 Encounters:   12/13/22 225 lb 6.4 oz (102.2 kg)   11/17/22 233 lb 12.8 oz (106.1 kg)   11/16/22 233 lb 6.4 oz (105.9 kg)       BP Readings from Last 3 Encounters:   12/13/22 118/72   12/13/22 117/73   11/17/22 132/74       Physical Exam  Constitutional:       Appearance: She is obese. HENT:      Head: Normocephalic and atraumatic. Comments: Palpable and tender LAD     Right Ear: External ear normal.      Left Ear: External ear normal.      Nose: Nose normal.   Eyes:      Conjunctiva/sclera: Conjunctivae normal.   Cardiovascular:      Rate and Rhythm: Normal rate and regular rhythm. Heart sounds: No murmur heard. Pulmonary:      Effort: Pulmonary effort is normal.   Abdominal:      Palpations: Abdomen is soft. Lymphadenopathy:      Cervical: Cervical adenopathy (right sided cervical LAD with one palpable nodule) present. Skin:     General: Skin is warm. Neurological:      General: No focal deficit present. Mental Status: She is alert and oriented to person, place, and time. Psychiatric:         Mood and Affect: Mood normal.         Behavior: Behavior normal.         Thought Content:  Thought content normal.            Lab Review:   Lab Results   Component Value Date/Time     10/29/2022 06:50 AM    K 3.6 10/29/2022 06:50 AM     10/29/2022 06:50 AM    CO2 22 10/29/2022 06:50 AM    BUN 7 10/29/2022 06:50 AM    CREATININE <0.5 10/29/2022 06:50 AM    GLUCOSE 241 10/29/2022 06:50 AM    CALCIUM 8.4 10/29/2022 06:50 AM     Lab Results   Component Value Date/Time    TROPONINI <0.01 10/28/2022 01:26 AM     Lab Results   Component Value Date/Time    CHOL 231 11/17/2022 11:45 AM    TRIG 415 11/17/2022 11:45 AM    HDL 34 11/17/2022 11:45 AM    LDLDIRECT 145 11/17/2022 11:45 AM          Assessment/Plan:  Cabrera Parrish is a 54year old female with history of T2DM, HTN, hyperlipidemia with history of TIA, class III obesity, LLQ mass and left leg abscess s/p debridement and I&D.  She presented for post-ED visit follow-up    Diagnosis and all orders for this visit:    GERD  - not controlled, patient symptomatic with coughing and vomiting when supine, likely secondary to reflux  - start omeprazole 40 mg daily  - recommend sitting upright for 30 min to 1 hour after meals  - follow up to reassess in 1 month    Left Knee Pain  -likely osteoarthritis  -Ddx: gout, patellar tendonitis, medial meniscus tear, bursitis  - start diclofenac 75 mg twice daily for pain and inflammation  - recommend stretching and strengthening exercises for functional improvement  -patient declining formal physical therapy at this time due to difficulties with transportation, discussed reconsidering in the future if pain is not improved with conservative management  - follow-up to reassess in 1 month     Hypertension  - at goal per EUGENIO guidelines, /72 today  - Continue lisinopril 5 mg daily  - recommend home blood pressure readings with bicep cuff  - discussed dietary and lifestyle modifications including DASH diet and 150 minutes of moderate-high intensity exercise weekly  Lab Results   Component Value Date    LABMICR <1.20 11/17/2022   -follow-up scheduled in one month      T2DM  -Uncontrolled, POC A1c 10.6% 11/17/22  -Continue metformin  mg daily  -Continue empagliflozin 25 mg p.o. daily  -discontinue dulaglutide 0.75 mg once weekly due to adverse side effects of severe nausea and vomiting  -Discontinue insulin use for now, can reconsider starting in the future if uncontrolled blood sugars with new medication regimen  -Diabetic foot exam completed 11/17  -Diabetic eye exam due, patient will schedule  -Follow-up scheduled in 1 month     Hyperlipidemia  -Continue atorvastatin 40 mg daily, due to history of TIA patient will benefit from moderate to high intensity statin therapy (AAFP, 2017)    Lab Results   Component Value Date    CHOL 231 (H) 11/17/2022    TRIG 415 (H) 11/17/2022    HDL 34 (L) 11/17/2022    LDLCALC see below 11/17/2022    LABVLDL see below 11/17/2022     The 10-year ASCVD risk score (Eliot ELENA, et al., 2019) is: 6%    Values used to calculate the score:      Age: 50 years      Sex: Female      Is Non- : No      Diabetic: Yes      Tobacco smoker: No      Systolic Blood Pressure: 137 mmHg      Is BP treated: Yes      HDL Cholesterol: 34 mg/dL      Total Cholesterol: 231 mg/dL    -follow up scheduled in 1 month    History of TIAs  -continue aspirin 81 mg daily  -continue atorvastatin 40 mg daily  -Encourage rescheduling consult with vascular surgeon for evaluation and management of bilateral proximal ICA plaques and stenosis     Left lower abdominal hernia  Chronic pain  - symptomatic, found on CT abdomen  -patient was taking cyclobenzaprine 10 mg TID for pain, also reports past tramadol use for abdominal pain  -patient seen by surgery today, procedure scheduled for 2/2/23  -continue tramadol as prescribed by surgery    Class III Obesity  -With Body mass index is 44.02 kg/m². Complicating assessment and treatment. Placing patient at risk for multiple co-morbidities as well as early death and contributing to the patient's presentation. Counseled on weight loss.    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 Record release signed last visit.   - pap smear will complete with OB per patient preference    Health Maintenance Due:  Health Maintenance Due   Topic Date Due    COVID-19 Vaccine (1) Never done    Pneumococcal 0-64 years Vaccine (1 - PCV) Never done    HIV screen  Never done    Diabetic retinal exam  Never done    Hepatitis C screen  Never done    Hepatitis B vaccine (1 of 3 - Risk 3-dose series) Never done    DTaP/Tdap/Td vaccine (1 - Tdap) Never done    Cervical cancer screen  Never done    Colorectal Cancer Screen  Never done    Flu vaccine (1) Never done          Health care decision maker:  <72years old  Vot-ER:  asked and patient declined      Health Maintenance: (USPSTF Recommendations)  (F) Breast Cancer Screen: (40-49 (C), 50-74 biennial screening mammogram (B))  (F) Cervical Cancer Screen: (21-29 q3yr cytology alone; 30-65 q3yr cytology alone, q5yr with hrHPV alone, or q5yr cytology+hrHPV (A))  (M) Prostate Cancer Screen: (54-79 yo discuss benefits/harm, does not recommend testing PSA in men >75 yo (D):   (M) AAA Screen: (men 73-69 yo who has ever smoked (B), consider in nonsmokers if high risk):  CRC/Colonoscopy Screening: (adults 39-53 (B), 50-75 (A))  Lung Ca Screening: Annual LDCT (+smoker age 49-80, smoked within 15 years, total of 20 pack yr history (B)):  DEXA Screen: (women >65 and older, <65 if at risk/postmenopausal (B))  HIV Screen: (16-65 yr old, and all pregnant patients (A)): Hep C Screen: (18-79 yr old (B)):  HCC Screen: (all pts with cirrhosis and high risk Hep B (US q6 mo)):  Immunizations:    RTC:  Return in about 5 weeks (around 1/19/2023) for scheduled with Dr. Anna Rosales. F/U scheduled with Dr. Anna Rosales on 1/19/23    EMR Dragon/transcription disclaimer:  Much of this encounter note is electronic transcription/translation of spoken language to printed texts. The electronic translation of spoken language may be erroneous, or at times, nonsensical words or phrases may be inadvertently transcribed.   Although I have reviewed the note for such errors, some may still exist.

## 2022-12-13 ENCOUNTER — INITIAL CONSULT (OUTPATIENT)
Dept: SURGERY | Age: 52
End: 2022-12-13
Payer: MEDICAID

## 2022-12-13 ENCOUNTER — TELEPHONE (OUTPATIENT)
Dept: PRIMARY CARE CLINIC | Age: 52
End: 2022-12-13

## 2022-12-13 ENCOUNTER — OFFICE VISIT (OUTPATIENT)
Dept: PRIMARY CARE CLINIC | Age: 52
End: 2022-12-13
Payer: MEDICAID

## 2022-12-13 VITALS
SYSTOLIC BLOOD PRESSURE: 117 MMHG | BODY MASS INDEX: 45.75 KG/M2 | HEART RATE: 90 BPM | HEIGHT: 61 IN | DIASTOLIC BLOOD PRESSURE: 73 MMHG

## 2022-12-13 VITALS
HEART RATE: 89 BPM | OXYGEN SATURATION: 97 % | TEMPERATURE: 97.2 F | DIASTOLIC BLOOD PRESSURE: 72 MMHG | RESPIRATION RATE: 18 BRPM | WEIGHT: 225.4 LBS | SYSTOLIC BLOOD PRESSURE: 118 MMHG | BODY MASS INDEX: 44.25 KG/M2 | HEIGHT: 60 IN

## 2022-12-13 DIAGNOSIS — E11.9 TYPE 2 DIABETES MELLITUS WITHOUT COMPLICATION, WITH LONG-TERM CURRENT USE OF INSULIN (HCC): Primary | ICD-10-CM

## 2022-12-13 DIAGNOSIS — Z79.4 TYPE 2 DIABETES MELLITUS WITHOUT COMPLICATION, WITH LONG-TERM CURRENT USE OF INSULIN (HCC): Primary | ICD-10-CM

## 2022-12-13 DIAGNOSIS — K45.8 RECURRENT ABDOMINAL HERNIA WITHOUT OBSTRUCTION OR GANGRENE, UNSPECIFIED HERNIA TYPE: Primary | ICD-10-CM

## 2022-12-13 DIAGNOSIS — I10 PRIMARY HYPERTENSION: ICD-10-CM

## 2022-12-13 DIAGNOSIS — K21.9 GASTROESOPHAGEAL REFLUX DISEASE, UNSPECIFIED WHETHER ESOPHAGITIS PRESENT: ICD-10-CM

## 2022-12-13 DIAGNOSIS — D27.0 TERATOMA OF OVARY, RIGHT: ICD-10-CM

## 2022-12-13 DIAGNOSIS — M25.562 CHRONIC PAIN OF LEFT KNEE: ICD-10-CM

## 2022-12-13 DIAGNOSIS — I65.23 INTERNAL CAROTID ARTERY STENOSIS, BILATERAL: ICD-10-CM

## 2022-12-13 DIAGNOSIS — G89.29 CHRONIC PAIN OF LEFT KNEE: ICD-10-CM

## 2022-12-13 DIAGNOSIS — E78.5 HYPERLIPIDEMIA, UNSPECIFIED HYPERLIPIDEMIA TYPE: ICD-10-CM

## 2022-12-13 DIAGNOSIS — K43.9 VENTRAL HERNIA WITHOUT OBSTRUCTION OR GANGRENE: ICD-10-CM

## 2022-12-13 DIAGNOSIS — Z86.73 HISTORY OF TIA (TRANSIENT ISCHEMIC ATTACK): ICD-10-CM

## 2022-12-13 PROCEDURE — 3074F SYST BP LT 130 MM HG: CPT

## 2022-12-13 PROCEDURE — 3046F HEMOGLOBIN A1C LEVEL >9.0%: CPT

## 2022-12-13 PROCEDURE — 3078F DIAST BP <80 MM HG: CPT | Performed by: SURGERY

## 2022-12-13 PROCEDURE — 99204 OFFICE O/P NEW MOD 45 MIN: CPT | Performed by: SURGERY

## 2022-12-13 PROCEDURE — 3078F DIAST BP <80 MM HG: CPT

## 2022-12-13 PROCEDURE — 3074F SYST BP LT 130 MM HG: CPT | Performed by: SURGERY

## 2022-12-13 PROCEDURE — 99214 OFFICE O/P EST MOD 30 MIN: CPT

## 2022-12-13 RX ORDER — OMEPRAZOLE 40 MG/1
40 CAPSULE, DELAYED RELEASE ORAL
Qty: 90 CAPSULE | Refills: 1 | Status: SHIPPED | OUTPATIENT
Start: 2022-12-13

## 2022-12-13 RX ORDER — DICLOFENAC SODIUM 75 MG/1
75 TABLET, DELAYED RELEASE ORAL 2 TIMES DAILY
Qty: 60 TABLET | Refills: 0 | Status: SHIPPED | OUTPATIENT
Start: 2022-12-13

## 2022-12-13 RX ORDER — SODIUM CHLORIDE 9 MG/ML
INJECTION, SOLUTION INTRAVENOUS PRN
OUTPATIENT
Start: 2022-12-13

## 2022-12-13 RX ORDER — SODIUM CHLORIDE 0.9 % (FLUSH) 0.9 %
5-40 SYRINGE (ML) INJECTION PRN
OUTPATIENT
Start: 2022-12-13

## 2022-12-13 RX ORDER — TRAMADOL HYDROCHLORIDE 50 MG/1
50 TABLET ORAL EVERY 4 HOURS PRN
Qty: 18 TABLET | Refills: 0 | Status: CANCELLED | OUTPATIENT
Start: 2022-12-13 | End: 2022-12-16

## 2022-12-13 RX ORDER — SODIUM CHLORIDE 0.9 % (FLUSH) 0.9 %
5-40 SYRINGE (ML) INJECTION EVERY 12 HOURS SCHEDULED
OUTPATIENT
Start: 2022-12-13

## 2022-12-13 RX ORDER — TRAMADOL HYDROCHLORIDE 50 MG/1
50 TABLET ORAL EVERY 6 HOURS PRN
Qty: 28 TABLET | Refills: 0 | Status: SHIPPED | OUTPATIENT
Start: 2022-12-13 | End: 2022-12-20

## 2022-12-13 ASSESSMENT — ENCOUNTER SYMPTOMS
PHOTOPHOBIA: 0
VOMITING: 1
SORE THROAT: 0
ABDOMINAL PAIN: 1
NAUSEA: 0
CHEST TIGHTNESS: 0
RHINORRHEA: 0
WHEEZING: 0
DIARRHEA: 0
COUGH: 1
SHORTNESS OF BREATH: 0

## 2022-12-13 ASSESSMENT — PATIENT HEALTH QUESTIONNAIRE - PHQ9
1. LITTLE INTEREST OR PLEASURE IN DOING THINGS: 0
10. IF YOU CHECKED OFF ANY PROBLEMS, HOW DIFFICULT HAVE THESE PROBLEMS MADE IT FOR YOU TO DO YOUR WORK, TAKE CARE OF THINGS AT HOME, OR GET ALONG WITH OTHER PEOPLE: 1
5. POOR APPETITE OR OVEREATING: 2
3. TROUBLE FALLING OR STAYING ASLEEP: 2
4. FEELING TIRED OR HAVING LITTLE ENERGY: 2
SUM OF ALL RESPONSES TO PHQ QUESTIONS 1-9: 7
9. THOUGHTS THAT YOU WOULD BE BETTER OFF DEAD, OR OF HURTING YOURSELF: 0
SUM OF ALL RESPONSES TO PHQ QUESTIONS 1-9: 7
7. TROUBLE CONCENTRATING ON THINGS, SUCH AS READING THE NEWSPAPER OR WATCHING TELEVISION: 1
2. FEELING DOWN, DEPRESSED OR HOPELESS: 0
SUM OF ALL RESPONSES TO PHQ QUESTIONS 1-9: 7
6. FEELING BAD ABOUT YOURSELF - OR THAT YOU ARE A FAILURE OR HAVE LET YOURSELF OR YOUR FAMILY DOWN: 0
SUM OF ALL RESPONSES TO PHQ9 QUESTIONS 1 & 2: 0
8. MOVING OR SPEAKING SO SLOWLY THAT OTHER PEOPLE COULD HAVE NOTICED. OR THE OPPOSITE, BEING SO FIGETY OR RESTLESS THAT YOU HAVE BEEN MOVING AROUND A LOT MORE THAN USUAL: 0
SUM OF ALL RESPONSES TO PHQ QUESTIONS 1-9: 7

## 2022-12-13 ASSESSMENT — ANXIETY QUESTIONNAIRES
6. BECOMING EASILY ANNOYED OR IRRITABLE: 1
1. FEELING NERVOUS, ANXIOUS, OR ON EDGE: 1
GAD7 TOTAL SCORE: 6
5. BEING SO RESTLESS THAT IT IS HARD TO SIT STILL: 1
4. TROUBLE RELAXING: 1
7. FEELING AFRAID AS IF SOMETHING AWFUL MIGHT HAPPEN: 0
3. WORRYING TOO MUCH ABOUT DIFFERENT THINGS: 1
IF YOU CHECKED OFF ANY PROBLEMS ON THIS QUESTIONNAIRE, HOW DIFFICULT HAVE THESE PROBLEMS MADE IT FOR YOU TO DO YOUR WORK, TAKE CARE OF THINGS AT HOME, OR GET ALONG WITH OTHER PEOPLE: SOMEWHAT DIFFICULT
2. NOT BEING ABLE TO STOP OR CONTROL WORRYING: 1

## 2022-12-13 NOTE — PROGRESS NOTES
[facility], [address] [place] [address] 2 [address] [facility], [facility]  Tel: [phone]    General Surgery Consultation Note    Name: Veda Wing Date: 2022   MRN: [id number] Sex: Female   Age: 50 y.o. Ethnicity: Non- / Non    : 1970 Race: White (non-)      Reason for consultation: ventral hernia     Subjective:   Flor Rice is a 51 yo female who presents to the office as a new patient consult from her PCP, Santo Walter DO, regarding concerns for a possible ventral hernia. Patient has a past medical history of Diabetes mellitus ([address] Headache, Hyperlipidemia, and TIA (transient ischemic attack). Patient says that in her LLQ she has had, for over 3 years, a protrusion of some sort that is palpable and tender. She says it is painful, especially when she eats. She reports it \"strangulates\" every now and then, and seems to be getting larger, noting that it was the size of a golf ball at first but now is the size of a softball. She reports that food gets stuck in it as it juts out and subsequently fels like a hard bal that gets so painful that she cries because of it. On 10/28/22, she had a CT Abdomen/Pelvis without contrast done which noted \"Fat containing ventral upper abdominal hernia and left lower abdominal  hernia without acute complication. \" Denies cigarette smoking. 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 Relevant surgical history includes: 2 C-sections in the past, most recently 24 years ago, Gallbladder removed in , Abscess in her left thigh incised and drained last month.        Past Medical History:   Diagnosis Date    Diabetes mellitus ([address]     Headache     Hyperlipidemia     TIA (transient ischemic attack)     , 2017 per pt      Family History   Problem Relation Age of Onset    Diabetes Mother     Diabetes Brother     Diabetes Brother Breast Cancer Paternal Grandmother     Heart Disease Paternal Grandfather      Current Outpatient Medications   Medication Instructions    aspirin 81 mg, Oral, DAILY    atorvastatin (LIPITOR) 40 mg, Oral, DAILY    blood glucose test strips (ASCENSIA AUTODISC VI;ONE TOUCH ULTRA TEST VI) strip TEST BLOOD SUGARS ONCE A DAY EVERY MORNING    cyclobenzaprine (FLEXERIL) 10 mg, Oral, 3 TIMES DAILY PRN    diclofenac (VOLTAREN) 75 mg, Oral, 2 TIMES DAILY    empagliflozin (JARDIANCE) 25 mg, Oral, DAILY    lisinopril (PRINIVIL;ZESTRIL) 5 MG tablet TAKE 1 TABLET BY MOUTH EVERY DAY    metFORMIN (GLUCOPHAGE-XR) 500 mg, Oral, DAILY WITH BREAKFAST    omeprazole (PRILOSEC) 40 mg, Oral, DAILY BEFORE BREAKFAST    traMADol (ULTRAM) 50 mg, Oral, EVERY 6 HOURS PRN, Intended supply: 7 days. Take lowest dose possible to manage pain      Social History     Socioeconomic History    Marital status:      Spouse name: None    Number of children: None    Years of education: None    Highest education level: None   Tobacco Use    Smoking status: Never    Smokeless tobacco: Never     Social Determinants of Health     Financial Resource Strain: Low Risk     Difficulty of Paying Living Expenses: Not hard at all   Food Insecurity: No Food Insecurity    Worried About Running Out of Food in the Last Year: Never true    Ran Out of Food in the Last Year: Never true     Allergies   Allergen Reactions    Iv Contrast [Iodides]      Past Surgical History:   Procedure Laterality Date     SECTION      CHOLECYSTECTOMY      LEG SURGERY Left 10/28/2022    LEFT VANI  DEBRIDEMENT INCISION AND DRAINAGE performed by Min Marie MD at [facility]       There is no immunization history on file for this patient. Objective    Vitals:    22 1323   BP: 117/73   Pulse: 90     Physical exam  General appearance - Morbidly obese. Alert, well appearing, and in no distress and oriented to person, place, and time.    Mental status - Normal mood, behavior, speech, dress, motor activity, and thought processes. Abdomen - Soft, non-tender, non-istended, no masses or organomegaly EXCEPT FOR LLQ. HERNIA EXAM: LLQ hernia, non-reducible, extremely tender mass. Assessment / Plan    1. Recurrent abdominal hernia without obstruction or gangrene, unspecified hernia type  CT abdomen and pelvis wo contrast from 10/28/22 shows soft tissue herniation through defect in abdominal/pelvic wall  Patient had 2 Caesarean sections in the past. Even though the most recent was 24 years ago, it is possible that the defect in the abdominal wall was a result of this and has slowly built up over time as fat/mesentery/omental tissue has herniated and now become incarcerated. Risks and benefits of surgery were discussed. Answered all patient questions regarding nature of operation and what to expect before, during, and afterwards. Patient says she would like something to help with her pain for symptom control, but is willing to wait until some time early in  for an operation. Plan:  Patient will call office at some point soon to schedule surgery. To help with pain, order placed for:  traMADol (ULTRAM) 50 MG tablet; Take 1 tablet by mouth every 6 hours as needed for Pain for up to 7 days. Intended supply: 7 days. Take lowest dose possible to manage pain  Dispense: 28 tablet; Refill: 0    ___________________________________________  Electronically signed on 2022   by Dr. Loy Sanchez DO  PGY-1, Family and Community Medicine Resident         Surgery Staff    I have examined this patient, and read and agree with the note by Loy Sanchez DO from today; more than half of the total time was spent by me on the encounter. She has an incarcerated, symptomatic lower abdominal wall incisional hernia, likely related to remote h/o . Also with a small fascial defect in upper midline. Both hernias only contain fat, likely incarcerated omentum. CT abd/pelv (images reviewed by me)  1. Fat stranding and inflammation centered in the medial upper left thigh   bordering the perineum. There is minimal stranding in the labia and left   inguinal canal but not the primary site. There may be a small phlegmon just   under the skin in the medial left upper thigh. There is no abscess at the   labia or inguinal canal.       2.  No fat stranding or inflammation within the internal abdomen, pelvis, and   retroperitoneum. 3.  Fat containing ventral upper abdominal hernia and left lower abdominal   hernia without acute complication. 4.  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 I recommend surgical repair, at least of the larger lower defect. We should be able to accomplish a minimally invasive approach to repair this hernia, hopefully via a robotic retrorectus technique. I discussed the technical aspects, risks and complications, including bleeding, infection, injury to surrounding structures, need for further surgery, recurrence of hernia.   Patient and  appear to understand, ask appropriate questions, and aggree to proceed    Farheen Malik MD

## 2022-12-14 ENCOUNTER — TELEPHONE (OUTPATIENT)
Dept: SURGERY | Age: 52
End: 2022-12-14

## 2022-12-14 NOTE — PATIENT INSTRUCTIONS
[address] [address]  Phone: [phone]  Fax: [phone] will be scheduled for surgery with Dr. Mirna Smith. The office will call you with the date and time that surgery is scheduled. Please take note of these instructions for surgery: You should have nothing by mouth after midnight the night before your surgery - this includes no food or water. Your surgery will be cancelled if you have taken anything by mouth after midnight, NO exceptions. You will need to have a history and physical prior to your surgery. This will need to be completed up to 30 days before your surgery. This H/P can be completed by your family doctor or the hospital.   IF you take coumadin (warfarin), please stop taking this medication 5 days prior to your surgery. IF you take plavix, please stop taking this 7 days prior to your surgery. Please contact our office if you have a pacemaker or defibrillator. IF you are allergic to latex, please tell our office prior to your surgery. This is important in know before scheduling your surgery. IF you are having an out patient surgery, you will need someone available to drive you home after your surgery, and to also stay with you for the rest of the day. IF you are having a surgery requiring an inpatient stay in the hospital, you will need someone to drive you home upon discharge from the hospital.  Please contact Dr. To Yost assistant Tina Nelson if you have any questions or concerns. Please call the office with any changes in your symptoms or further questions/concerns.  [phone]

## 2022-12-14 NOTE — TELEPHONE ENCOUNTER
Pt saw Dr Analisa Whiting in the office 12/13/22 and was given surgery instructions for a Robotic Incisional Hernia Repair (eTEP) w/ Mesh, Possible Open Procedure (General Anes) scheduled 2/2/23 @ 7:30am arrival [address] - NPO after midnight belgica b/f surgery - Pt will need  day of surgery - Pt will need cardiac clearance (and is currently establishing care w/ cardiologist) - Dr Karen Mullins to complete pre-op physical - Pt understood and agreed w/ above noted

## 2022-12-26 ENCOUNTER — PATIENT MESSAGE (OUTPATIENT)
Dept: FAMILY MEDICINE CLINIC | Age: 52
End: 2022-12-26

## 2022-12-27 NOTE — TELEPHONE ENCOUNTER
From: Genevieve Spencer  To:  Nancy Sierra  Sent: 12/26/2022 10:18 PM EST  Subject: Vignesh Cuenca I have a question     Hello I am a patient and IV been having some trouble with severe coughing fits uncontrollable by meds over the counter it's not a sore throat or a cold symptom possibly swollen lymph nodes I'm not sure of what else to do I can't sleep through the night please help

## 2022-12-29 RX ORDER — LISINOPRIL 5 MG/1
TABLET ORAL
Qty: 90 TABLET | Refills: 1 | Status: SHIPPED | OUTPATIENT
Start: 2022-12-29

## 2022-12-29 RX ORDER — DICLOFENAC SODIUM 75 MG/1
TABLET, DELAYED RELEASE ORAL
Qty: 60 TABLET | Refills: 0 | Status: SHIPPED | OUTPATIENT
Start: 2022-12-29

## 2022-12-29 RX ORDER — ASPIRIN 81 MG/1
TABLET, COATED ORAL
Qty: 90 TABLET | Refills: 1 | Status: SHIPPED | OUTPATIENT
Start: 2022-12-29

## 2023-01-19 ENCOUNTER — HOSPITAL ENCOUNTER (OUTPATIENT)
Age: 53
Discharge: HOME | End: 2023-01-19
Payer: MEDICAID

## 2023-01-19 ENCOUNTER — OFFICE VISIT (OUTPATIENT)
Dept: PRIMARY CARE CLINIC | Age: 53
End: 2023-01-19

## 2023-01-19 VITALS
HEIGHT: 60 IN | BODY MASS INDEX: 44.17 KG/M2 | OXYGEN SATURATION: 96 % | SYSTOLIC BLOOD PRESSURE: 108 MMHG | HEART RATE: 86 BPM | WEIGHT: 225 LBS | DIASTOLIC BLOOD PRESSURE: 76 MMHG | RESPIRATION RATE: 16 BRPM

## 2023-01-19 DIAGNOSIS — I10 PRIMARY HYPERTENSION: ICD-10-CM

## 2023-01-19 DIAGNOSIS — R05.8 ACE-INHIBITOR COUGH: ICD-10-CM

## 2023-01-19 DIAGNOSIS — M25.562 CHRONIC PAIN OF LEFT KNEE: ICD-10-CM

## 2023-01-19 DIAGNOSIS — D27.0 TERATOMA OF OVARY, RIGHT: ICD-10-CM

## 2023-01-19 DIAGNOSIS — D23.4 CYST, DERMOID, SCALP AND NECK: ICD-10-CM

## 2023-01-19 DIAGNOSIS — Z12.11 ENCOUNTER FOR SCREENING COLONOSCOPY: ICD-10-CM

## 2023-01-19 DIAGNOSIS — K43.9 VENTRAL HERNIA WITHOUT OBSTRUCTION OR GANGRENE: ICD-10-CM

## 2023-01-19 DIAGNOSIS — G89.29 CHRONIC PAIN OF LEFT KNEE: ICD-10-CM

## 2023-01-19 DIAGNOSIS — B37.31 VAGINAL CANDIDIASIS: ICD-10-CM

## 2023-01-19 DIAGNOSIS — E11.9 TYPE 2 DIABETES MELLITUS WITHOUT COMPLICATION, WITH LONG-TERM CURRENT USE OF INSULIN (HCC): ICD-10-CM

## 2023-01-19 DIAGNOSIS — Z79.4 TYPE 2 DIABETES MELLITUS WITHOUT COMPLICATION, WITH LONG-TERM CURRENT USE OF INSULIN (HCC): ICD-10-CM

## 2023-01-19 DIAGNOSIS — Z86.73 HISTORY OF TIA (TRANSIENT ISCHEMIC ATTACK): ICD-10-CM

## 2023-01-19 DIAGNOSIS — K21.9 GASTROESOPHAGEAL REFLUX DISEASE, UNSPECIFIED WHETHER ESOPHAGITIS PRESENT: ICD-10-CM

## 2023-01-19 DIAGNOSIS — E78.5 HYPERLIPIDEMIA, UNSPECIFIED HYPERLIPIDEMIA TYPE: ICD-10-CM

## 2023-01-19 DIAGNOSIS — Z79.4 TYPE 2 DIABETES MELLITUS WITHOUT COMPLICATION, WITH LONG-TERM CURRENT USE OF INSULIN (HCC): Primary | ICD-10-CM

## 2023-01-19 DIAGNOSIS — E11.9 TYPE 2 DIABETES MELLITUS WITHOUT COMPLICATION, WITH LONG-TERM CURRENT USE OF INSULIN (HCC): Primary | ICD-10-CM

## 2023-01-19 DIAGNOSIS — T46.4X5A ACE-INHIBITOR COUGH: ICD-10-CM

## 2023-01-19 LAB
ANION GAP SERPL CALCULATED.3IONS-SCNC: 16 MMOL/L (ref 3–16)
BILIRUBIN, POC: NEGATIVE
BLOOD URINE, POC: NORMAL
BUN BLDV-MCNC: 9 MG/DL (ref 7–20)
CALCIUM SERPL-MCNC: 9.6 MG/DL (ref 8.3–10.6)
CHLORIDE BLD-SCNC: 99 MMOL/L (ref 99–110)
CHOLESTEROL, TOTAL: 180 MG/DL (ref 0–199)
CLARITY, POC: CLEAR
CO2: 21 MMOL/L (ref 21–32)
COLOR, POC: CLEAR
CREAT SERPL-MCNC: 0.6 MG/DL (ref 0.6–1.1)
GFR SERPL CREATININE-BSD FRML MDRD: >60 ML/MIN/{1.73_M2}
GLUCOSE BLD-MCNC: 317 MG/DL (ref 70–99)
GLUCOSE URINE, POC: NORMAL
HDLC SERPL-MCNC: 37 MG/DL (ref 40–60)
KETONES, POC: NEGATIVE
LDL CHOLESTEROL CALCULATED: ABNORMAL MG/DL
LDL CHOLESTEROL DIRECT: 96 MG/DL
LEUKOCYTE EST, POC: NEGATIVE
NITRITE, POC: NEGATIVE
PH, POC: 5.5
POTASSIUM SERPL-SCNC: 4.7 MMOL/L (ref 3.5–5.1)
PROTEIN, POC: NEGATIVE
SODIUM BLD-SCNC: 136 MMOL/L (ref 136–145)
SPECIFIC GRAVITY, POC: 1.01
TRIGL SERPL-MCNC: 437 MG/DL (ref 0–150)
UROBILINOGEN, POC: 0.2
VLDLC SERPL CALC-MCNC: ABNORMAL MG/DL

## 2023-01-19 PROCEDURE — 83721 ASSAY OF BLOOD LIPOPROTEIN: CPT

## 2023-01-19 PROCEDURE — 36415 COLL VENOUS BLD VENIPUNCTURE: CPT

## 2023-01-19 PROCEDURE — 80048 BASIC METABOLIC PNL TOTAL CA: CPT

## 2023-01-19 PROCEDURE — 83036 HEMOGLOBIN GLYCOSYLATED A1C: CPT

## 2023-01-19 PROCEDURE — 80061 LIPID PANEL: CPT

## 2023-01-19 RX ORDER — LOSARTAN POTASSIUM 25 MG/1
25 TABLET ORAL DAILY
Qty: 90 TABLET | Refills: 1 | Status: ON HOLD | OUTPATIENT
Start: 2023-01-19

## 2023-01-19 RX ORDER — FLUCONAZOLE 150 MG/1
150 TABLET ORAL ONCE
Qty: 2 TABLET | Refills: 0 | Status: SHIPPED | OUTPATIENT
Start: 2023-01-19 | End: 2023-01-19

## 2023-01-19 ASSESSMENT — ENCOUNTER SYMPTOMS
DIARRHEA: 0
VOMITING: 1
SORE THROAT: 0
COUGH: 1
CHEST TIGHTNESS: 0
NAUSEA: 0

## 2023-01-19 ASSESSMENT — PATIENT HEALTH QUESTIONNAIRE - PHQ9
SUM OF ALL RESPONSES TO PHQ QUESTIONS 1-9: 4
8. MOVING OR SPEAKING SO SLOWLY THAT OTHER PEOPLE COULD HAVE NOTICED. OR THE OPPOSITE, BEING SO FIGETY OR RESTLESS THAT YOU HAVE BEEN MOVING AROUND A LOT MORE THAN USUAL: 0
7. TROUBLE CONCENTRATING ON THINGS, SUCH AS READING THE NEWSPAPER OR WATCHING TELEVISION: 0
6. FEELING BAD ABOUT YOURSELF - OR THAT YOU ARE A FAILURE OR HAVE LET YOURSELF OR YOUR FAMILY DOWN: 0
2. FEELING DOWN, DEPRESSED OR HOPELESS: 0
SUM OF ALL RESPONSES TO PHQ QUESTIONS 1-9: 4
SUM OF ALL RESPONSES TO PHQ9 QUESTIONS 1 & 2: 0
3. TROUBLE FALLING OR STAYING ASLEEP: 3
10. IF YOU CHECKED OFF ANY PROBLEMS, HOW DIFFICULT HAVE THESE PROBLEMS MADE IT FOR YOU TO DO YOUR WORK, TAKE CARE OF THINGS AT HOME, OR GET ALONG WITH OTHER PEOPLE: 2
1. LITTLE INTEREST OR PLEASURE IN DOING THINGS: 0
9. THOUGHTS THAT YOU WOULD BE BETTER OFF DEAD, OR OF HURTING YOURSELF: 0
SUM OF ALL RESPONSES TO PHQ QUESTIONS 1-9: 4
SUM OF ALL RESPONSES TO PHQ QUESTIONS 1-9: 4
5. POOR APPETITE OR OVEREATING: 0
4. FEELING TIRED OR HAVING LITTLE ENERGY: 1

## 2023-01-19 ASSESSMENT — ANXIETY QUESTIONNAIRES
5. BEING SO RESTLESS THAT IT IS HARD TO SIT STILL: 0
4. TROUBLE RELAXING: 1
7. FEELING AFRAID AS IF SOMETHING AWFUL MIGHT HAPPEN: 0
1. FEELING NERVOUS, ANXIOUS, OR ON EDGE: 0
2. NOT BEING ABLE TO STOP OR CONTROL WORRYING: 1
3. WORRYING TOO MUCH ABOUT DIFFERENT THINGS: 1
6. BECOMING EASILY ANNOYED OR IRRITABLE: 1
GAD7 TOTAL SCORE: 4
IF YOU CHECKED OFF ANY PROBLEMS ON THIS QUESTIONNAIRE, HOW DIFFICULT HAVE THESE PROBLEMS MADE IT FOR YOU TO DO YOUR WORK, TAKE CARE OF THINGS AT HOME, OR GET ALONG WITH OTHER PEOPLE: SOMEWHAT DIFFICULT

## 2023-01-19 NOTE — PROGRESS NOTES
[address] and [facility]                                             [address] [facility], [facility], [address] [place] [address]        Phone: [phone]      Name:  Nathalia Lynne  :    1970    Consultants:   Patient Care Team:  Piper Arroyo DO as PCP - General (Family Medicine)    Chief Complaint:     Nathalia Lynne is a 50 y.o. female  who presents today for an established patient care visit with Personalized Prevention Plan Services as noted below. HPI:     Nathalia Lynne is a 57-year-old female with history of GERD, left knee pain, hypertension, T2DM, hyperlipidemia, left lower abdominal hernia, history of TIAs, small ovarian dermoid/mature teratoma, class III obesity here to follow-up on chronic care. Chronic Cough  -Patient reports she had COVID infection late December. She is still concerned about a small, soft, mobile mass on her right anterior neck. She also is experiencing chronic cough, all day and night, at times inducing vomiting. No hematemesis.     Vaginal Discharge  -patient endorses vaginal burning, dysuria, vaginal discharge, believes secondary to jardiance    GERD  -Patient reported last visit  she experienced chronic cough and vomiting when supine  -Omeprazole 40 mg oral daily started at last visit    Left knee pain, likely osteoarthritis  -Started diclofenac 75 mg twice daily for pain and inflammation last visit   -Physical therapy declined last visit    Hypertension  -BP today: 108/76  -Patient taking lisinopril 5 mg daily  -denies chest pain, headache, vision changes, dizziness    Hyperlipidemia  -Patient taking atorvastatin 40 mg daily  -10-year ASCVD risk score: 6%    Lab Results   Component Value Date    CHOL 231 (H) 2022    TRIG 415 (H) 2022    HDL 34 (L) 2022    LDLCALC see below 2022    LABVLDL see below 2022     Lab Results   Component Value Date    LDLCALC see below 11/17/2022    LDLDIRECT 145 (H) 11/17/2022     T2DM  -Patient taking metformin  mg daily, empagliflozin 25 mg oral daily, dulaglutide 0.75 mg discontinued last visit due to severe nausea and vomiting  -Diabetic eye exam due, patient will schedule, foot exam completed 11/17    Lab Results   Component Value Date    LABA1C 10.7 11/17/2022    LABA1C 10.6 11/17/2022     Left lower abdominal hernia  Chronic pain  -Patient taking cyclobenzaprine 10 mg 3 times daily for pain, tramadol by general surgery for abdominal pain  -Procedure scheduled for 2/2/2023    History of TIAs  -Continue aspirin 81 mg daily  -Continue atorvastatin 40 mg daily  -Needs to reschedule with vascular surgeon    Health maintenance  -Mammogram up-to-date, negative  -Order placed for colonoscopy, not completed, wasn't able to get prep  -Due for flu and COVID  -Due for HIV and hep C screening  -Pap smear with OB/GYN 2/1/2023 per patient preference  -colonoscopy scheduled   -vascular surgery referral needed      Patient Active Problem List   Diagnosis    TIA (transient ischemic attack)    Class 3 severe obesity with serious comorbidity and body mass index (BMI) of 40.0 to 44.9 in adult (HCC)    Type 2 diabetes mellitus without complication, with long-term current use of insulin (HCC)    Abscess of left thigh    Sepsis (HCC)    Tachycardia    Tachypnea    Neutrophilic leukocytosis    Hidradenitis suppurativa    Groin abscess    Teratoma of ovary, right    Ventral hernia without obstruction or gangrene    Hyperlipidemia    History of TIA (transient ischemic attack)    Internal carotid artery stenosis, bilateral    Primary hypertension         Past Medical History:    Past Medical History:   Diagnosis Date    Diabetes mellitus (HCC)     Headache     Hyperlipidemia     TIA (transient ischemic attack)     2014, 2017 per pt       Past Surgical History:  Past Surgical History:   Procedure Laterality Date      SECTION      CHOLECYSTECTOMY      LEG SURGERY Left 10/28/2022    LEFT VANI  DEBRIDEMENT INCISION AND DRAINAGE performed by Tati Leos MD at [facility]:  Prior to Visit Medications    Medication Sig Taking? Authorizing Provider   fluconazole (DIFLUCAN) 150 MG tablet Take 1 tablet by mouth once for 1 dose Take second dose in 7 days if unresolved symptoms Yes María Roy DO   losartan (COZAAR) 25 MG tablet Take 1 tablet by mouth daily Yes María Roy DO   ASPIRIN LOW DOSE 81 MG EC tablet TAKE 1 TABLET BY MOUTH EVERY DAY Yes María Roy DO   diclofenac (VOLTAREN) 75 MG EC tablet TAKE 1 TABLET BY MOUTH TWICE A DAY Yes Arron Delgado DO   omeprazole (PRILOSEC) 40 MG delayed release capsule Take 1 capsule by mouth every morning (before breakfast) Yes María Roy DO   atorvastatin (LIPITOR) 40 MG tablet Take 1 tablet by mouth daily Yes María Roy DO   empagliflozin (JARDIANCE) 25 MG tablet Take 1 tablet by mouth daily Yes María Roy DO   metFORMIN (GLUCOPHAGE-XR) 500 MG extended release tablet Take 1 tablet by mouth daily (with breakfast) Yes María Roy DO   cyclobenzaprine (FLEXERIL) 10 MG tablet Take 10 mg by mouth 3 times daily as needed  Patient not taking: Reported on 2023  Historical Provider, MD   blood glucose test strips (ASCENSIA AUTODISC VI;ONE TOUCH ULTRA TEST VI) strip TEST BLOOD SUGARS ONCE A DAY EVERY MORNING  María Roy DO       Allergies:     Iv contrast [iodides]    Family History:       Problem Relation Age of Onset    Diabetes Mother     Diabetes Brother     Diabetes Brother     Breast Cancer Paternal Grandmother     Heart Disease Paternal Grandfather          Health Maintenance Completed:  Health Maintenance   Topic Date Due    COVID-19 Vaccine (1) Never done    Pneumococcal 0-64 years Vaccine (1 - PCV) Never done    HIV screen  Never done    Diabetic retinal exam  Never done    Hepatitis C screen  Never done Hepatitis B vaccine (1 of 3 - Risk 3-dose series) Never done    DTaP/Tdap/Td vaccine (1 - Tdap) Never done    Shingles vaccine (1 of 2) Never done    Cervical cancer screen  Never done    Colorectal Cancer Screen  Never done    Flu vaccine (1) Never done    A1C test (Diabetic or Prediabetic)  02/17/2023    GFR test (Diabetes, CKD 3-4, OR last GFR 15-59)  10/29/2023    Diabetic foot exam  11/17/2023    Diabetic Alb to Cr ratio (uACR) test  11/17/2023    Lipids  11/17/2023    Depression Screen  12/13/2023    Breast cancer screen  12/06/2024    Hepatitis A vaccine  Aged Out    Hib vaccine  Aged Out    Meningococcal (ACWY) vaccine  Aged Out            There is no immunization history on file for this patient. Review of Systems:  Review of Systems   Constitutional:  Negative for chills and fever. HENT:  Negative for congestion and sore throat. Respiratory:  Positive for cough. Negative for chest tightness. Gastrointestinal:  Positive for vomiting. Negative for diarrhea and nausea. Endocrine: Negative for polyuria. Genitourinary:  Positive for dysuria and vaginal discharge. Negative for difficulty urinating and hematuria. Neurological:  Negative for syncope, weakness, numbness and headaches. Psychiatric/Behavioral: Negative. Physical Exam:   Vitals:    01/19/23 1220   BP: 108/76   Site: Left Upper Arm   Position: Sitting   Cuff Size: Small Adult   Pulse: 86   Resp: 16   SpO2: 96%   Weight: 225 lb (102.1 kg)   Height: 5' (1.524 m)     Body mass index is 43.94 kg/m². Wt Readings from Last 3 Encounters:   01/19/23 225 lb (102.1 kg)   12/13/22 225 lb 6.4 oz (102.2 kg)   11/17/22 233 lb 12.8 oz (106.1 kg)       BP Readings from Last 3 Encounters:   01/19/23 108/76   12/13/22 118/72   12/13/22 117/73       Physical Exam  Constitutional:       Appearance: She is obese. HENT:      Head: Normocephalic and atraumatic.       Comments: Palpable and tender LAD     Right Ear: External ear normal. Left Ear: External ear normal.      Nose: Nose normal.   Eyes:      Conjunctiva/sclera: Conjunctivae normal.   Cardiovascular:      Rate and Rhythm: Normal rate and regular rhythm. Heart sounds: No murmur heard. Pulmonary:      Effort: Pulmonary effort is normal.   Abdominal:      Palpations: Abdomen is soft. Lymphadenopathy:      Cervical: Cervical adenopathy (right sided cervical LAD with one palpable nodule) present. Skin:     General: Skin is warm. Neurological:      General: No focal deficit present. Mental Status: She is alert and oriented to person, place, and time. Psychiatric:         Mood and Affect: Mood normal.         Behavior: Behavior normal.         Thought Content: Thought content normal.           Lab Review:   No visits with results within 2 Month(s) from this visit. Latest known visit with results is:   Hospital Outpatient Visit on 11/17/2022   Component Date Value    Hemoglobin A1C 11/17/2022 10.7     eAG 11/17/2022 260.4     Microalbumin, Random Uri* 11/17/2022 <1.20     Creatinine, Ur 11/17/2022 100.4     Microalbumin Creatinine * 11/17/2022 see below     Cholesterol, Total 11/17/2022 231 (A)     Triglycerides 11/17/2022 415 (A)     HDL 11/17/2022 34 (A)     LDL Calculated 11/17/2022 see below     VLDL Cholesterol Calcula* 11/17/2022 see below     LDL Direct 11/17/2022 145 (A)           Assessment/Plan:  Tamiko Dutton was seen today for follow-up chronic condition. Diagnoses and all orders for this visit:    Type 2 diabetes mellitus without complication, with long-term current use of insulin ([facility] [address]  -     Ambulatory Referral To Diabetes Education  -     Hemoglobin A1C; Future  -     Basic Metabolic Panel; Future  -     Lipid Panel;  Future  -     Cancel: Urinalysis with Microscopic  -     POCT Urinalysis no Micro    Hyperlipidemia, unspecified hyperlipidemia type    History of TIA (transient ischemic attack)    Ventral hernia without obstruction or gangrene    Primary hypertension    Teratoma of ovary, right    Gastroesophageal reflux disease, unspecified whether esophagitis present    Chronic pain of left knee    Encounter for screening colonoscopy  -     [facility]um Sheridan MD, Gastroenterology, [facility]    Cyst, dermoid, scalp and neck  -     Maldonado Storey MD, Otolaryngology, [facility]    Other orders  -     fluconazole (DIFLUCAN) 150 MG tablet; Take 1 tablet by mouth once for 1 dose Take second dose in 7 days if unresolved symptoms  -     losartan (COZAAR) 25 MG tablet;  Take 1 tablet by mouth daily    Diagnosis and all orders for this visit:    Vaginal Candidiasis  -likely secondary to empagliflozin use  -start fluconazole 150 mg oral for one dose, can take second dose 7 days later if continuing symptoms  -educated patient on empagliflozin use, and maintaining hygiene to prevent future infection  -follow up in 2 months    GERD  - controlled  - continue omeprazole 40 mg daily  - recommend sitting upright for 30 min to 1 hour after meals  - follow up in 2 months    Left Knee Pain  -likely osteoarthritis  -Ddx: gout, patellar tendonitis, medial meniscus tear, bursitis  - continue diclofenac 75 mg twice daily for pain and inflammation  - recommend stretching and strengthening exercises for functional improvement  -patient declining formal physical therapy at this time due to difficulties with transportation, discussed reconsidering in the future if pain is not improved with conservative management  - follow-up to reassess in 2 months     Hypertension  ACE-inhibitor cough  - at goal per EUGENIO guidelines, /76 today  - discontinue lisinopril 5 mg daily due to chronic cough  -start losartan 25 mg oral daily   - recommend home blood pressure readings with bicep cuff  - discussed dietary and lifestyle modifications including DASH diet and 150 minutes of moderate-high intensity exercise weekly    Lab Results   Component Value Date    LABMICR <1.20 11/17/2022 - follow up in 2 months      T2DM  -Uncontrolled, POC A1c 10.6% 11/17/22  -Continue metformin  mg daily  -Continue empagliflozin 25 mg p.o. daily  -repeat A1C today  -Discontinue insulin use for now, can reconsider starting in the future if uncontrolled blood sugars with new medication regimen  -Diabetic foot exam completed 11/17  -Diabetic eye exam due, patient will schedule  - follow up in 2 months     Hyperlipidemia  -Continue atorvastatin 40 mg daily, due to history of TIA patient will benefit from moderate to high intensity statin therapy (AAFP, 2017)    Lab Results   Component Value Date    CHOL 231 (H) 11/17/2022    TRIG 415 (H) 11/17/2022    HDL 34 (L) 11/17/2022    LDLCALC see below 11/17/2022    LABVLDL see below 11/17/2022     The 10-year ASCVD risk score (Eliot ELENA, et al., 2019) is: 5%    Values used to calculate the score:      Age: 50 years      Sex: Female      Is Non- : No      Diabetic: Yes      Tobacco smoker: No      Systolic Blood Pressure: 754 mmHg      Is BP treated: Yes      HDL Cholesterol: 34 mg/dL      Total Cholesterol: 231 mg/dL    - follow up in 2 months    History of TIAs  -continue aspirin 81 mg daily  -continue atorvastatin 40 mg daily  -Encourage rescheduling consult with vascular surgeon for evaluation and management of bilateral proximal ICA plaques and stenosis   - follow up in 2 months    Left lower abdominal hernia  Chronic pain  - symptomatic, found on CT abdomen  -patient was taking cyclobenzaprine 10 mg TID for pain, also reports past tramadol use for abdominal pain  -patient seen by surgery today, procedure scheduled for 2/2/23  - follow up in 2 months    Class III Obesity  -With Body mass index is 43.94 kg/m². Complicating assessment and treatment. Placing patient at risk for multiple co-morbidities as well as early death and contributing to the patient's presentation. Counseled on weight loss.    -recommend 150 minutes of moderate-intensity physical activity weekly and 2 days of muscle strengthening activity (CDC, 2022)  -recommend dietary modifications including avoiding processed / refined carbohydrates, and encouraging healthy proteins (chicken, eggs, fish, lean meats) and fats (butter, avocado)    Small ovarian dermoid/mature teratoma  Need for screening pap smear  - Small ovarian dermoid/mature teratoma in the right ovary found on CT scan 10/28/2022  - referral to OB for further management, appointment scheduled     Health Maintenance  - mammogram completed, negative  - no past colonoscopy, order placed last visit  - Patient declines vaccines including flu and covid today  - HIV, HepC screening at previous physician. Record release signed last visit.   - pap smear will complete with OB per patient preference    Health Maintenance Due:  Health Maintenance Due   Topic Date Due    COVID-19 Vaccine (1) Never done    Pneumococcal 0-64 years Vaccine (1 - PCV) Never done    HIV screen  Never done    Diabetic retinal exam  Never done    Hepatitis C screen  Never done    Hepatitis B vaccine (1 of 3 - Risk 3-dose series) Never done    DTaP/Tdap/Td vaccine (1 - Tdap) Never done    Shingles vaccine (1 of 2) Never done    Cervical cancer screen  Never done    Colorectal Cancer Screen  Never done    Flu vaccine (1) Never done    A1C test (Diabetic or Prediabetic)  02/17/2023          Health care decision maker:  <72years old  Vot-ER:        Health Maintenance: (USPSTF Recommendations)  (F) Breast Cancer Screen: (40-49 (C), 50-74 biennial screening mammogram (B))  (F) Cervical Cancer Screen: (21-29 q3yr cytology alone; 30-65 q3yr cytology alone, q5yr with hrHPV alone, or q5yr cytology+hrHPV (A))  (M) Prostate Cancer Screen: (54-77 yo discuss benefits/harm, does not recommend testing PSA in men >75 yo (D):   (M) AAA Screen: (men 73-69 yo who has ever smoked (B), consider in nonsmokers if high risk):  CRC/Colonoscopy Screening: (adults 39-53 (B), 50-75 (A))  Lung Ca Screening: Annual LDCT (+smoker age 49-80, smoked within 15 years, total of 20 pack yr history (B)):  DEXA Screen: (women >65 and older, <65 if at risk/postmenopausal (B))  HIV Screen: (16-65 yr old, and all pregnant patients (A)): Hep C Screen: (18-79 yr old (B)):  HCC Screen: (all pts with cirrhosis and high risk Hep B (US q6 mo)):  Immunizations:    RTC:  Return in about 2 months (around 3/19/2023) for chronic condition f/u with Dr. Varsha Seaman. EMR Dragon/transcription disclaimer:  Much of this encounter note is electronic transcription/translation of spoken language to printed texts. The electronic translation of spoken language may be erroneous, or at times, nonsensical words or phrases may be inadvertently transcribed.   Although I have reviewed the note for such errors, some may still exist.     Broderick Alex DO, PGY-1   [facility] Medicine Residency Program

## 2023-01-20 LAB
ESTIMATED AVERAGE GLUCOSE: 243.2 MG/DL
HBA1C MFR BLD: 10.1 %

## 2023-01-21 DIAGNOSIS — Z79.4 TYPE 2 DIABETES MELLITUS WITHOUT COMPLICATION, WITH LONG-TERM CURRENT USE OF INSULIN (HCC): Primary | ICD-10-CM

## 2023-01-21 DIAGNOSIS — E11.9 TYPE 2 DIABETES MELLITUS WITHOUT COMPLICATION, WITH LONG-TERM CURRENT USE OF INSULIN (HCC): Primary | ICD-10-CM

## 2023-01-21 RX ORDER — METFORMIN HYDROCHLORIDE 500 MG/1
1000 TABLET, EXTENDED RELEASE ORAL
Qty: 180 TABLET | Refills: 1 | Status: SHIPPED | OUTPATIENT
Start: 2023-01-21

## 2023-01-22 ENCOUNTER — HOSPITAL ENCOUNTER (INPATIENT)
Age: 53
LOS: 2 days | Discharge: HOME | DRG: 191 | End: 2023-01-26
Attending: EMERGENCY MEDICINE | Admitting: INTERNAL MEDICINE
Payer: MEDICAID

## 2023-01-22 ENCOUNTER — APPOINTMENT (OUTPATIENT)
Dept: CT IMAGING | Age: 53
DRG: 191 | End: 2023-01-22
Payer: MEDICAID

## 2023-01-22 DIAGNOSIS — R07.9 CHEST PAIN, UNSPECIFIED TYPE: Primary | ICD-10-CM

## 2023-01-22 DIAGNOSIS — E87.20 LACTIC ACIDOSIS: ICD-10-CM

## 2023-01-22 DIAGNOSIS — R55 NEAR SYNCOPE: ICD-10-CM

## 2023-01-22 DIAGNOSIS — R73.9 HYPERGLYCEMIA: ICD-10-CM

## 2023-01-22 LAB
A/G RATIO: 1.4 (ref 1.1–2.2)
ALBUMIN SERPL-MCNC: 4.2 G/DL (ref 3.4–5)
ALP BLD-CCNC: 130 U/L (ref 40–129)
ALT SERPL-CCNC: 18 U/L (ref 10–40)
AMPHETAMINE SCREEN, URINE: NORMAL
ANION GAP SERPL CALCULATED.3IONS-SCNC: 12 MMOL/L (ref 3–16)
AST SERPL-CCNC: 15 U/L (ref 15–37)
BARBITURATE SCREEN URINE: NORMAL
BASOPHILS ABSOLUTE: 0 K/UL (ref 0–0.2)
BASOPHILS RELATIVE PERCENT: 0.4 %
BENZODIAZEPINE SCREEN, URINE: NORMAL
BILIRUB SERPL-MCNC: 0.3 MG/DL (ref 0–1)
BILIRUBIN URINE: NEGATIVE
BLOOD, URINE: NEGATIVE
BUN BLDV-MCNC: 11 MG/DL (ref 7–20)
CALCIUM SERPL-MCNC: 9.1 MG/DL (ref 8.3–10.6)
CANNABINOID SCREEN URINE: NORMAL
CHLORIDE BLD-SCNC: 100 MMOL/L (ref 99–110)
CLARITY: CLEAR
CO2: 21 MMOL/L (ref 21–32)
COCAINE METABOLITE SCREEN URINE: NORMAL
COLOR: ABNORMAL
CREAT SERPL-MCNC: 0.6 MG/DL (ref 0.6–1.1)
EOSINOPHILS ABSOLUTE: 0.2 K/UL (ref 0–0.6)
EOSINOPHILS RELATIVE PERCENT: 1.7 %
ETHANOL: NORMAL MG/DL (ref 0–0.08)
FENTANYL SCREEN, URINE: NORMAL
GFR SERPL CREATININE-BSD FRML MDRD: >60 ML/MIN/{1.73_M2}
GLUCOSE BLD-MCNC: 257 MG/DL (ref 70–99)
GLUCOSE BLD-MCNC: 359 MG/DL (ref 70–99)
GLUCOSE URINE: >=1000 MG/DL
HCG QUALITATIVE: NEGATIVE
HCT VFR BLD CALC: 41.5 % (ref 36–48)
HEMOGLOBIN: 13.2 G/DL (ref 12–16)
KETONES, URINE: NEGATIVE MG/DL
LACTIC ACID: 2.6 MMOL/L (ref 0.4–2)
LACTIC ACID: 3.4 MMOL/L (ref 0.4–2)
LEUKOCYTE ESTERASE, URINE: NEGATIVE
LYMPHOCYTES ABSOLUTE: 2.3 K/UL (ref 1–5.1)
LYMPHOCYTES RELATIVE PERCENT: 25.5 %
Lab: NORMAL
MCH RBC QN AUTO: 25.2 PG (ref 26–34)
MCHC RBC AUTO-ENTMCNC: 31.7 G/DL (ref 31–36)
MCV RBC AUTO: 79.5 FL (ref 80–100)
METHADONE SCREEN, URINE: NORMAL
MICROSCOPIC EXAMINATION: ABNORMAL
MONOCYTES ABSOLUTE: 0.6 K/UL (ref 0–1.3)
MONOCYTES RELATIVE PERCENT: 6.4 %
NEUTROPHILS ABSOLUTE: 5.9 K/UL (ref 1.7–7.7)
NEUTROPHILS RELATIVE PERCENT: 66 %
NITRITE, URINE: NEGATIVE
OPIATE SCREEN URINE: NORMAL
OXYCODONE URINE: NORMAL
PDW BLD-RTO: 15.6 % (ref 12.4–15.4)
PERFORMED ON: ABNORMAL
PH UA: 5.5
PH UA: 5.5 (ref 5–8)
PHENCYCLIDINE SCREEN URINE: NORMAL
PLATELET # BLD: 428 K/UL (ref 135–450)
PMV BLD AUTO: 7.7 FL (ref 5–10.5)
POTASSIUM REFLEX MAGNESIUM: 3.9 MMOL/L (ref 3.5–5.1)
PROTEIN UA: NEGATIVE MG/DL
RBC # BLD: 5.23 M/UL (ref 4–5.2)
SODIUM BLD-SCNC: 133 MMOL/L (ref 136–145)
SPECIFIC GRAVITY UA: <=1.005 (ref 1–1.03)
TOTAL PROTEIN: 7.3 G/DL (ref 6.4–8.2)
TROPONIN: <0.01 NG/ML
TROPONIN: <0.01 NG/ML
URINE REFLEX TO CULTURE: ABNORMAL
URINE TYPE: ABNORMAL
UROBILINOGEN, URINE: 0.2 E.U./DL
WBC # BLD: 8.9 K/UL (ref 4–11)

## 2023-01-22 PROCEDURE — 81003 URINALYSIS AUTO W/O SCOPE: CPT

## 2023-01-22 PROCEDURE — 80307 DRUG TEST PRSMV CHEM ANLYZR: CPT

## 2023-01-22 PROCEDURE — 6360000002 HC RX W HCPCS: Performed by: EMERGENCY MEDICINE

## 2023-01-22 PROCEDURE — 93005 ELECTROCARDIOGRAM TRACING: CPT | Performed by: EMERGENCY MEDICINE

## 2023-01-22 PROCEDURE — G0378 HOSPITAL OBSERVATION PER HR: HCPCS

## 2023-01-22 PROCEDURE — 83605 ASSAY OF LACTIC ACID: CPT

## 2023-01-22 PROCEDURE — 6370000000 HC RX 637 (ALT 250 FOR IP): Performed by: EMERGENCY MEDICINE

## 2023-01-22 PROCEDURE — 85025 COMPLETE CBC W/AUTO DIFF WBC: CPT

## 2023-01-22 PROCEDURE — 80053 COMPREHEN METABOLIC PANEL: CPT

## 2023-01-22 PROCEDURE — 96375 TX/PRO/DX INJ NEW DRUG ADDON: CPT

## 2023-01-22 PROCEDURE — 71260 CT THORAX DX C+: CPT | Performed by: EMERGENCY MEDICINE

## 2023-01-22 PROCEDURE — 6360000004 HC RX CONTRAST MEDICATION: Performed by: EMERGENCY MEDICINE

## 2023-01-22 PROCEDURE — 84484 ASSAY OF TROPONIN QUANT: CPT

## 2023-01-22 PROCEDURE — 82077 ASSAY SPEC XCP UR&BREATH IA: CPT

## 2023-01-22 PROCEDURE — 2580000003 HC RX 258: Performed by: EMERGENCY MEDICINE

## 2023-01-22 PROCEDURE — 99285 EMERGENCY DEPT VISIT HI MDM: CPT

## 2023-01-22 PROCEDURE — 84703 CHORIONIC GONADOTROPIN ASSAY: CPT

## 2023-01-22 PROCEDURE — 6370000000 HC RX 637 (ALT 250 FOR IP): Performed by: NURSE PRACTITIONER

## 2023-01-22 PROCEDURE — 96361 HYDRATE IV INFUSION ADD-ON: CPT

## 2023-01-22 PROCEDURE — 36415 COLL VENOUS BLD VENIPUNCTURE: CPT

## 2023-01-22 PROCEDURE — 2580000003 HC RX 258: Performed by: NURSE PRACTITIONER

## 2023-01-22 PROCEDURE — 96374 THER/PROPH/DIAG INJ IV PUSH: CPT

## 2023-01-22 RX ORDER — ONDANSETRON 4 MG/1
4 TABLET, ORALLY DISINTEGRATING ORAL EVERY 8 HOURS PRN
Status: DISCONTINUED | OUTPATIENT
Start: 2023-01-22 | End: 2023-01-26 | Stop reason: HOSPADM

## 2023-01-22 RX ORDER — ENOXAPARIN SODIUM 100 MG/ML
40 INJECTION SUBCUTANEOUS DAILY
Status: DISCONTINUED | OUTPATIENT
Start: 2023-01-23 | End: 2023-01-23

## 2023-01-22 RX ORDER — METHYLPREDNISOLONE SODIUM SUCCINATE 125 MG/2ML
125 INJECTION, POWDER, LYOPHILIZED, FOR SOLUTION INTRAMUSCULAR; INTRAVENOUS ONCE
Status: COMPLETED | OUTPATIENT
Start: 2023-01-22 | End: 2023-01-22

## 2023-01-22 RX ORDER — SODIUM CHLORIDE 9 MG/ML
INJECTION, SOLUTION INTRAVENOUS PRN
Status: DISCONTINUED | OUTPATIENT
Start: 2023-01-22 | End: 2023-01-26 | Stop reason: HOSPADM

## 2023-01-22 RX ORDER — SODIUM CHLORIDE 9 MG/ML
INJECTION, SOLUTION INTRAVENOUS CONTINUOUS
Status: ACTIVE | OUTPATIENT
Start: 2023-01-22 | End: 2023-01-23

## 2023-01-22 RX ORDER — GUAIFENESIN/DEXTROMETHORPHAN 100-10MG/5
5 SYRUP ORAL EVERY 4 HOURS PRN
Status: DISCONTINUED | OUTPATIENT
Start: 2023-01-22 | End: 2023-01-26 | Stop reason: HOSPADM

## 2023-01-22 RX ORDER — LOSARTAN POTASSIUM 25 MG/1
25 TABLET ORAL DAILY
Status: DISCONTINUED | OUTPATIENT
Start: 2023-01-22 | End: 2023-01-26 | Stop reason: HOSPADM

## 2023-01-22 RX ORDER — SODIUM CHLORIDE, SODIUM LACTATE, POTASSIUM CHLORIDE, AND CALCIUM CHLORIDE .6; .31; .03; .02 G/100ML; G/100ML; G/100ML; G/100ML
2000 INJECTION, SOLUTION INTRAVENOUS ONCE
Status: COMPLETED | OUTPATIENT
Start: 2023-01-22 | End: 2023-01-22

## 2023-01-22 RX ORDER — ACETAMINOPHEN 650 MG/1
650 SUPPOSITORY RECTAL EVERY 6 HOURS PRN
Status: DISCONTINUED | OUTPATIENT
Start: 2023-01-22 | End: 2023-01-26 | Stop reason: HOSPADM

## 2023-01-22 RX ORDER — ONDANSETRON 2 MG/ML
4 INJECTION INTRAMUSCULAR; INTRAVENOUS EVERY 6 HOURS PRN
Status: DISCONTINUED | OUTPATIENT
Start: 2023-01-22 | End: 2023-01-26 | Stop reason: HOSPADM

## 2023-01-22 RX ORDER — ACETAMINOPHEN 325 MG/1
650 TABLET ORAL EVERY 6 HOURS PRN
Status: DISCONTINUED | OUTPATIENT
Start: 2023-01-22 | End: 2023-01-26 | Stop reason: HOSPADM

## 2023-01-22 RX ORDER — INSULIN LISPRO 100 [IU]/ML
0-16 INJECTION, SOLUTION INTRAVENOUS; SUBCUTANEOUS
Status: DISCONTINUED | OUTPATIENT
Start: 2023-01-23 | End: 2023-01-23

## 2023-01-22 RX ORDER — PANTOPRAZOLE SODIUM 40 MG/1
40 TABLET, DELAYED RELEASE ORAL
Status: DISCONTINUED | OUTPATIENT
Start: 2023-01-22 | End: 2023-01-26 | Stop reason: HOSPADM

## 2023-01-22 RX ORDER — ATORVASTATIN CALCIUM 40 MG/1
40 TABLET, FILM COATED ORAL DAILY
Status: DISCONTINUED | OUTPATIENT
Start: 2023-01-22 | End: 2023-01-26 | Stop reason: HOSPADM

## 2023-01-22 RX ORDER — INSULIN LISPRO 100 [IU]/ML
0-4 INJECTION, SOLUTION INTRAVENOUS; SUBCUTANEOUS NIGHTLY
Status: DISCONTINUED | OUTPATIENT
Start: 2023-01-22 | End: 2023-01-24 | Stop reason: SDUPTHER

## 2023-01-22 RX ORDER — ASPIRIN 81 MG/1
81 TABLET ORAL DAILY
Status: DISCONTINUED | OUTPATIENT
Start: 2023-01-23 | End: 2023-01-26 | Stop reason: HOSPADM

## 2023-01-22 RX ORDER — DIPHENHYDRAMINE HYDROCHLORIDE 50 MG/ML
50 INJECTION INTRAMUSCULAR; INTRAVENOUS ONCE
Status: COMPLETED | OUTPATIENT
Start: 2023-01-22 | End: 2023-01-22

## 2023-01-22 RX ORDER — ASPIRIN 81 MG/1
324 TABLET, CHEWABLE ORAL ONCE
Status: COMPLETED | OUTPATIENT
Start: 2023-01-22 | End: 2023-01-22

## 2023-01-22 RX ORDER — SODIUM CHLORIDE 0.9 % (FLUSH) 0.9 %
5-40 SYRINGE (ML) INJECTION EVERY 12 HOURS SCHEDULED
Status: DISCONTINUED | OUTPATIENT
Start: 2023-01-22 | End: 2023-01-26 | Stop reason: HOSPADM

## 2023-01-22 RX ORDER — SODIUM CHLORIDE 0.9 % (FLUSH) 0.9 %
5-40 SYRINGE (ML) INJECTION PRN
Status: DISCONTINUED | OUTPATIENT
Start: 2023-01-22 | End: 2023-01-26 | Stop reason: HOSPADM

## 2023-01-22 RX ORDER — NITROGLYCERIN 0.4 MG/1
0.4 TABLET SUBLINGUAL EVERY 5 MIN PRN
Status: DISCONTINUED | OUTPATIENT
Start: 2023-01-22 | End: 2023-01-26 | Stop reason: HOSPADM

## 2023-01-22 RX ORDER — DEXTROSE MONOHYDRATE 100 MG/ML
INJECTION, SOLUTION INTRAVENOUS CONTINUOUS PRN
Status: DISCONTINUED | OUTPATIENT
Start: 2023-01-22 | End: 2023-01-26 | Stop reason: HOSPADM

## 2023-01-22 RX ORDER — POLYETHYLENE GLYCOL 3350 17 G/17G
17 POWDER, FOR SOLUTION ORAL DAILY PRN
Status: DISCONTINUED | OUTPATIENT
Start: 2023-01-22 | End: 2023-01-26 | Stop reason: HOSPADM

## 2023-01-22 RX ADMIN — ATORVASTATIN CALCIUM 40 MG: 40 TABLET, FILM COATED ORAL at 23:02

## 2023-01-22 RX ADMIN — ASPIRIN 324 MG: 81 TABLET, CHEWABLE ORAL at 19:54

## 2023-01-22 RX ADMIN — IOPAMIDOL 75 ML: 755 INJECTION, SOLUTION INTRAVENOUS at 18:52

## 2023-01-22 RX ADMIN — PANTOPRAZOLE SODIUM 40 MG: 40 TABLET, DELAYED RELEASE ORAL at 23:02

## 2023-01-22 RX ADMIN — METHYLPREDNISOLONE SODIUM SUCCINATE 125 MG: 125 INJECTION, POWDER, FOR SOLUTION INTRAMUSCULAR; INTRAVENOUS at 17:40

## 2023-01-22 RX ADMIN — ACETAMINOPHEN 325MG 650 MG: 325 TABLET ORAL at 23:32

## 2023-01-22 RX ADMIN — LOSARTAN POTASSIUM 25 MG: 25 TABLET, FILM COATED ORAL at 23:02

## 2023-01-22 RX ADMIN — SODIUM CHLORIDE: 9 INJECTION, SOLUTION INTRAVENOUS at 23:03

## 2023-01-22 RX ADMIN — Medication 10 ML: at 23:02

## 2023-01-22 RX ADMIN — SODIUM CHLORIDE, POTASSIUM CHLORIDE, SODIUM LACTATE AND CALCIUM CHLORIDE 2000 ML: 600; 310; 30; 20 INJECTION, SOLUTION INTRAVENOUS at 17:40

## 2023-01-22 RX ADMIN — DIPHENHYDRAMINE HYDROCHLORIDE 50 MG: 50 INJECTION, SOLUTION INTRAMUSCULAR; INTRAVENOUS at 17:40

## 2023-01-22 ASSESSMENT — PAIN SCALES - GENERAL
PAINLEVEL_OUTOF10: 6
PAINLEVEL_OUTOF10: 6
PAINLEVEL_OUTOF10: 5

## 2023-01-22 ASSESSMENT — HEART SCORE: ECG: 0

## 2023-01-22 NOTE — ED NOTES
Pt walk in to ER, brought back with ED tech to room 19 and while walking pt paused d/t feeling dizzy - was assisted to sitting position in hallway. Then assisted with staff into stretcher, brought to room 19 and work up initiated. Pt awake, alert and oriented entire time. No loss of consciousness. No injury suffered.      Kandace Roche RN  01/22/23 3515

## 2023-01-22 NOTE — ED PROVIDER NOTES
[facility]  ED  EMERGENCY DEPARTMENT ENCOUNTER        Pt Name: Pamela Guerrero  MRN: [id number]  [place] 1970  Date of evaluation: 2023  Provider: SARIAH Cummings  PCP: Kate Freedman DO  Note Started: 5:22 PM EST 23      BALDEV. I have evaluated this patient. My supervising physician was available for consultation. CHIEF COMPLAINT       Chief Complaint   Patient presents with    Jaw Pain     Pt began at 3 pm with jaw and chest pain   + short of breath. Recent covid +        HISTORY OF PRESENT ILLNESS: 1 or more Elements     History from : Patient        Pamela Guerrero is a 50 y.o. female who presents complaining of chest and jaw pain that began around 3 PM today. She also is complaining of shortness of breath. She recently had COVID and states she has not felt quite normal since then. She states she took a baby aspirin when the chest pain started and had good relief of symptoms therefore she did not come into the emergency department right away. She did take an Aurelia Leitz to the emergency department today. She states they moved from [place] about 1 year ago. While in [place] she had an angiogram that showed stenosis in heart artery however they were unable to place a stent but she is unsure why. She does not have a cardiologist here in [facility]. She denies a cough. No abdominal pain, nausea or vomiting. She does complain of lightheadedness and dizziness. Upon arrival to the emergency department where they were walking her into the department she had near syncopal episode and nursing staff assisted the patient to the ground. There was no loss of consciousness. .     Nursing Notes were all reviewed and agreed with or any disagreements were addressed in the HPI. REVIEW OF SYSTEMS :      Review of Systems    Positives and Pertinent negatives as per HPI.      SURGICAL HISTORY     Past Surgical History:   Procedure Laterality Date     SECTION CHOLECYSTECTOMY      LEG SURGERY Left 10/28/2022    LEFT VANI  DEBRIDEMENT INCISION AND DRAINAGE performed by Tung Rowe MD at [facility]       Previous Medications    ASPIRIN LOW DOSE 81 MG EC TABLET    TAKE 1 TABLET BY MOUTH EVERY DAY    ATORVASTATIN (LIPITOR) 40 MG TABLET    Take 1 tablet by mouth daily    BLOOD GLUCOSE TEST STRIPS (ASCENSIA AUTODISC VI;ONE TOUCH ULTRA TEST VI) STRIP    TEST BLOOD SUGARS ONCE A DAY EVERY MORNING    CYCLOBENZAPRINE (FLEXERIL) 10 MG TABLET    Take 10 mg by mouth 3 times daily as needed    DICLOFENAC (VOLTAREN) 75 MG EC TABLET    TAKE 1 TABLET BY MOUTH TWICE A DAY    EMPAGLIFLOZIN (JARDIANCE) 25 MG TABLET    Take 1 tablet by mouth daily    LOSARTAN (COZAAR) 25 MG TABLET    Take 1 tablet by mouth daily    METFORMIN (GLUCOPHAGE-XR) 500 MG EXTENDED RELEASE TABLET    Take 2 tablets by mouth daily (with breakfast)    OMEPRAZOLE (PRILOSEC) 40 MG DELAYED RELEASE CAPSULE    Take 1 capsule by mouth every morning (before breakfast)       ALLERGIES     Iv contrast [iodides]    FAMILYHISTORY       Family History   Problem Relation Age of Onset    Diabetes Mother     Diabetes Brother     Diabetes Brother     Breast Cancer Paternal Grandmother     Heart Disease Paternal Grandfather         SOCIAL HISTORY       Social History     Tobacco Use    Smoking status: Never    Smokeless tobacco: Never   Substance Use Topics    Alcohol use: Never    Drug use: Never       SCREENINGS        [place] Coma Scale  Eye Opening: Spontaneous  Best Verbal Response: Oriented  Best Motor Response: Obeys commands  [place] Coma Scale Score: 15                CIWA Assessment  BP: (!) 148/82  Heart Rate: 90           PHYSICAL EXAM  1 or more Elements     ED Triage Vitals [01/22/23 1719]   BP Temp Temp src Heart Rate Resp SpO2 Height Weight   -- -- -- (!) 102 19 96 % -- --       Physical Exam  Vitals and nursing note reviewed. Constitutional:       Appearance: Normal appearance.  She is not diaphoretic. HENT:      Head: Normocephalic and atraumatic. Nose: Nose normal.      Mouth/Throat:      Mouth: Mucous membranes are moist.   Eyes:      General:         Right eye: No discharge. Left eye: No discharge. Extraocular Movements: Extraocular movements intact. Pupils: Pupils are equal, round, and reactive to light. Cardiovascular:      Rate and Rhythm: Normal rate and regular rhythm. Pulses: Normal pulses. Heart sounds: Normal heart sounds. No murmur heard. No friction rub. No gallop. Pulmonary:      Effort: Pulmonary effort is normal. No respiratory distress. Breath sounds: Normal breath sounds. No stridor. No wheezing, rhonchi or rales. Abdominal:      General: Abdomen is flat. Palpations: Abdomen is soft. Tenderness: There is no abdominal tenderness. There is no guarding or rebound. Musculoskeletal:         General: Normal range of motion. Cervical back: Normal range of motion and neck supple. Right lower leg: No edema. Left lower leg: No edema. Skin:     General: Skin is warm and dry. Coloration: Skin is not pale. Neurological:      Mental Status: She is alert and oriented to person, place, and time.    Psychiatric:         Mood and Affect: Mood normal.         Behavior: Behavior normal.           DIAGNOSTIC RESULTS   LABS:    Labs Reviewed   CBC WITH AUTO DIFFERENTIAL - Abnormal; Notable for the following components:       Result Value    RBC 5.23 (*)     MCV 79.5 (*)     MCH 25.2 (*)     RDW 15.6 (*)     All other components within normal limits   COMPREHENSIVE METABOLIC PANEL W/ REFLEX TO MG FOR LOW K - Abnormal; Notable for the following components:    Sodium 133 (*)     Glucose 359 (*)     Alkaline Phosphatase 130 (*)     All other components within normal limits   LACTIC ACID - Abnormal; Notable for the following components:    Lactic Acid 3.4 (*)     All other components within normal limits   TROPONIN   ETHANOL   URINALYSIS WITH REFLEX TO CULTURE   URINE DRUG SCREEN       When ordered only abnormal lab results are displayed. All other labs were within normal range or not returned as of this dictation.    EKG: When ordered, EKG's are interpreted by the Emergency Department Physician in the absence of a cardiologist.  Please see their note for interpretation of EKG.    RADIOLOGY:   Non-plain film images such as CT, Ultrasound and MRI are read by the radiologist. Plain radiographic images are visualized and preliminarily interpreted by the ED Provider with the below findings:        Interpretation per the Radiologist below, if available at the time of this note:    CT CHEST PULMONARY EMBOLISM W CONTRAST    (Results Pending)     No results found.    No results found.    PROCEDURES   Unless otherwise noted below, none     Procedures    CRITICAL CARE TIME (.cctime)       PAST MEDICAL HISTORY      has a past medical history of Diabetes mellitus (HCC), Headache, Hyperlipidemia, and TIA (transient ischemic attack).     Chronic Conditions affecting Care: Diabetes, hyperlipidemia, TIA    EMERGENCY DEPARTMENT COURSE and DIFFERENTIAL DIAGNOSIS/MDM:   Vitals:    Vitals:    01/22/23 1719 01/22/23 1726 01/22/23 1752   BP:  113/64 (!) 148/82   Pulse: (!) 102  90   Resp: 19  19   Temp:  97.9 °F (36.6 °C)    TempSrc:  Oral    SpO2: 96%  99%       Patient was given the following medications:  Medications   lactated ringers bolus (2,000 mLs IntraVENous New Bag 1/22/23 1740)   methylPREDNISolone sodium (SOLU-MEDROL) injection 125 mg (125 mg IntraVENous Given 1/22/23 1740)   diphenhydrAMINE (BENADRYL) injection 50 mg (50 mg IntraVENous Given 1/22/23 1740)             Is this patient to be included in the SEP-1 Core Measure due to severe sepsis or septic shock?   No   Exclusion criteria - the patient is NOT to be included for SEP-1 Core Measure due to:  Infection is not suspected    CONSULTS: (Who and What was discussed)  None              CC/HPI  Summary, DDx, ED Course, and Reassessment: Patient was evaluated in the emergency department today for chest pain and near syncopal episode upon arrival to the emergency department.  She was slightly tachycardic but not hypoxic.  There was no loss of consciousness.  She is not on a blood thinner.  Differential diagnosis includes ACS, PE, orthostatic blood pressure and pulse, pneumonia/sepsis.    Work-up results include:  CBC without evidence of leukocytosis or acute anemia  CMP with sodium of 133.  Renal function maintained.  Alk phos slightly elevated at 130.  Troponin is less than 0.01  Ethanol is negative.  Lactic acid is slightly elevated at 3.4.    Given the patient's near syncopal episode and chest pain we will scan her chest to evaluate for PE. Results are pending.  I do expect the patient to be admitted for further evaluation and treatment of her symptoms.    Disposition Considerations (include 1 Tests not done, Shared Decision Making, Pt Expectation of Test or Tx.): 6:19 PM: I discussed the history, physical, and treatment plan with Dr. Ling. Robyn Nabil was signed out in stable condition. Please see Dr. Ling's note for further details, including diagnosis and disposition.          I am the Primary Clinician of Record.    FINAL IMPRESSION      1. Chest pain, unspecified type    2. Near syncope          DISPOSITION/PLAN     DISPOSITION        PATIENT REFERRED TO:  No follow-up provider specified.    DISCHARGE MEDICATIONS:  New Prescriptions    No medications on file       DISCONTINUED MEDICATIONS:  Discontinued Medications    No medications on file              (Please note that portions of this note were completed with a voice recognition program.  Efforts were made to edit the dictations but occasionally words are mis-transcribed.)    SARIAH Trinh (electronically signed)           SARIAH Trinh  01/23/23 4491

## 2023-01-23 ENCOUNTER — APPOINTMENT (OUTPATIENT)
Dept: ULTRASOUND IMAGING | Age: 53
DRG: 191 | End: 2023-01-23
Payer: MEDICAID

## 2023-01-23 ENCOUNTER — APPOINTMENT (OUTPATIENT)
Dept: NUCLEAR MEDICINE | Age: 53
DRG: 191 | End: 2023-01-23
Payer: MEDICAID

## 2023-01-23 ENCOUNTER — APPOINTMENT (OUTPATIENT)
Dept: CT IMAGING | Age: 53
DRG: 191 | End: 2023-01-23
Payer: MEDICAID

## 2023-01-23 LAB
ANION GAP SERPL CALCULATED.3IONS-SCNC: 10 MMOL/L (ref 3–16)
BUN BLDV-MCNC: 12 MG/DL (ref 7–20)
CALCIUM SERPL-MCNC: 9.3 MG/DL (ref 8.3–10.6)
CHLORIDE BLD-SCNC: 106 MMOL/L (ref 99–110)
CHOLESTEROL, TOTAL: 185 MG/DL (ref 0–199)
CO2: 21 MMOL/L (ref 21–32)
CREAT SERPL-MCNC: 0.7 MG/DL (ref 0.6–1.1)
EKG ATRIAL RATE: 83 BPM
EKG ATRIAL RATE: 89 BPM
EKG DIAGNOSIS: NORMAL
EKG DIAGNOSIS: NORMAL
EKG P AXIS: 50 DEGREES
EKG P AXIS: 50 DEGREES
EKG P-R INTERVAL: 120 MS
EKG P-R INTERVAL: 124 MS
EKG Q-T INTERVAL: 382 MS
EKG Q-T INTERVAL: 408 MS
EKG QRS DURATION: 80 MS
EKG QRS DURATION: 82 MS
EKG QTC CALCULATION (BAZETT): 448 MS
EKG QTC CALCULATION (BAZETT): 496 MS
EKG R AXIS: -48 DEGREES
EKG R AXIS: -58 DEGREES
EKG T AXIS: 63 DEGREES
EKG T AXIS: 67 DEGREES
EKG VENTRICULAR RATE: 83 BPM
EKG VENTRICULAR RATE: 89 BPM
GFR SERPL CREATININE-BSD FRML MDRD: >60 ML/MIN/{1.73_M2}
GLUCOSE BLD-MCNC: 206 MG/DL (ref 70–99)
GLUCOSE BLD-MCNC: 267 MG/DL (ref 70–99)
GLUCOSE BLD-MCNC: 302 MG/DL (ref 70–99)
GLUCOSE BLD-MCNC: 303 MG/DL (ref 70–99)
GLUCOSE BLD-MCNC: 307 MG/DL (ref 70–99)
HCT VFR BLD CALC: 40.5 % (ref 36–48)
HDLC SERPL-MCNC: 40 MG/DL (ref 40–60)
HEMOGLOBIN: 13.2 G/DL (ref 12–16)
LACTIC ACID: 2.1 MMOL/L (ref 0.4–2)
LDL CHOLESTEROL CALCULATED: 126 MG/DL
LV EF: 55 %
LV EF: 65 %
LVEF MODALITY: NORMAL
LVEF MODALITY: NORMAL
MCH RBC QN AUTO: 25.8 PG (ref 26–34)
MCHC RBC AUTO-ENTMCNC: 32.7 G/DL (ref 31–36)
MCV RBC AUTO: 79 FL (ref 80–100)
PDW BLD-RTO: 15.5 % (ref 12.4–15.4)
PERFORMED ON: ABNORMAL
PLATELET # BLD: 414 K/UL (ref 135–450)
PMV BLD AUTO: 7.4 FL (ref 5–10.5)
POTASSIUM REFLEX MAGNESIUM: 3.8 MMOL/L (ref 3.5–5.1)
RBC # BLD: 5.12 M/UL (ref 4–5.2)
SODIUM BLD-SCNC: 137 MMOL/L (ref 136–145)
TRIGL SERPL-MCNC: 93 MG/DL (ref 0–150)
TROPONIN: <0.01 NG/ML
VLDLC SERPL CALC-MCNC: 19 MG/DL
WBC # BLD: 11.1 K/UL (ref 4–11)

## 2023-01-23 PROCEDURE — 96375 TX/PRO/DX INJ NEW DRUG ADDON: CPT

## 2023-01-23 PROCEDURE — 93005 ELECTROCARDIOGRAM TRACING: CPT | Performed by: NURSE PRACTITIONER

## 2023-01-23 PROCEDURE — 3430000000 HC RX DIAGNOSTIC RADIOPHARMACEUTICAL: Performed by: NURSE PRACTITIONER

## 2023-01-23 PROCEDURE — 36415 COLL VENOUS BLD VENIPUNCTURE: CPT

## 2023-01-23 PROCEDURE — 6370000000 HC RX 637 (ALT 250 FOR IP): Performed by: INTERNAL MEDICINE

## 2023-01-23 PROCEDURE — G0378 HOSPITAL OBSERVATION PER HR: HCPCS | Performed by: INTERNAL MEDICINE

## 2023-01-23 PROCEDURE — 6360000002 HC RX W HCPCS: Performed by: NURSE PRACTITIONER

## 2023-01-23 PROCEDURE — 93306 TTE W/DOPPLER COMPLETE: CPT

## 2023-01-23 PROCEDURE — 80048 BASIC METABOLIC PNL TOTAL CA: CPT

## 2023-01-23 PROCEDURE — 76536 US EXAM OF HEAD AND NECK: CPT

## 2023-01-23 PROCEDURE — 70491 CT SOFT TISSUE NECK W/DYE: CPT

## 2023-01-23 PROCEDURE — 6360000002 HC RX W HCPCS

## 2023-01-23 PROCEDURE — 80061 LIPID PANEL: CPT

## 2023-01-23 PROCEDURE — G0378 HOSPITAL OBSERVATION PER HR: HCPCS

## 2023-01-23 PROCEDURE — 93010 ELECTROCARDIOGRAM REPORT: CPT | Performed by: INTERNAL MEDICINE

## 2023-01-23 PROCEDURE — 6360000002 HC RX W HCPCS: Performed by: INTERNAL MEDICINE

## 2023-01-23 PROCEDURE — 93017 CV STRESS TEST TRACING ONLY: CPT

## 2023-01-23 PROCEDURE — 96372 THER/PROPH/DIAG INJ SC/IM: CPT

## 2023-01-23 PROCEDURE — 6370000000 HC RX 637 (ALT 250 FOR IP): Performed by: NURSE PRACTITIONER

## 2023-01-23 PROCEDURE — 96376 TX/PRO/DX INJ SAME DRUG ADON: CPT

## 2023-01-23 PROCEDURE — 85027 COMPLETE CBC AUTOMATED: CPT

## 2023-01-23 PROCEDURE — 78452 HT MUSCLE IMAGE SPECT MULT: CPT

## 2023-01-23 PROCEDURE — A9502 TC99M TETROFOSMIN: HCPCS | Performed by: NURSE PRACTITIONER

## 2023-01-23 PROCEDURE — 6360000004 HC RX CONTRAST MEDICATION

## 2023-01-23 RX ORDER — INSULIN LISPRO 100 [IU]/ML
0-16 INJECTION, SOLUTION INTRAVENOUS; SUBCUTANEOUS
Status: DISCONTINUED | OUTPATIENT
Start: 2023-01-23 | End: 2023-01-23

## 2023-01-23 RX ORDER — INSULIN GLARGINE 100 [IU]/ML
10 INJECTION, SOLUTION SUBCUTANEOUS DAILY
Status: DISCONTINUED | OUTPATIENT
Start: 2023-01-23 | End: 2023-01-24

## 2023-01-23 RX ORDER — AMINOPHYLLINE DIHYDRATE 25 MG/ML
100 INJECTION, SOLUTION INTRAVENOUS ONCE
Status: COMPLETED | OUTPATIENT
Start: 2023-01-23 | End: 2023-01-23

## 2023-01-23 RX ORDER — ENOXAPARIN SODIUM 100 MG/ML
30 INJECTION SUBCUTANEOUS 2 TIMES DAILY
Status: DISCONTINUED | OUTPATIENT
Start: 2023-01-23 | End: 2023-01-26 | Stop reason: HOSPADM

## 2023-01-23 RX ORDER — DIPHENHYDRAMINE HYDROCHLORIDE 50 MG/ML
50 INJECTION INTRAMUSCULAR; INTRAVENOUS
Status: COMPLETED | OUTPATIENT
Start: 2023-01-23 | End: 2023-01-23

## 2023-01-23 RX ORDER — METHYLPREDNISOLONE SODIUM SUCCINATE 125 MG/2ML
125 INJECTION, POWDER, LYOPHILIZED, FOR SOLUTION INTRAMUSCULAR; INTRAVENOUS
Status: COMPLETED | OUTPATIENT
Start: 2023-01-23 | End: 2023-01-23

## 2023-01-23 RX ORDER — BENZONATATE 100 MG/1
100 CAPSULE ORAL 3 TIMES DAILY
Status: DISCONTINUED | OUTPATIENT
Start: 2023-01-23 | End: 2023-01-26 | Stop reason: HOSPADM

## 2023-01-23 RX ADMIN — ACETAMINOPHEN 325MG 650 MG: 325 TABLET ORAL at 08:26

## 2023-01-23 RX ADMIN — ACETAMINOPHEN 325MG 650 MG: 325 TABLET ORAL at 18:37

## 2023-01-23 RX ADMIN — REGADENOSON 0.4 MG: 0.08 INJECTION, SOLUTION INTRAVENOUS at 09:59

## 2023-01-23 RX ADMIN — INSULIN GLARGINE 10 UNITS: 100 INJECTION, SOLUTION SUBCUTANEOUS at 13:31

## 2023-01-23 RX ADMIN — IOPAMIDOL 75 ML: 755 INJECTION, SOLUTION INTRAVENOUS at 22:10

## 2023-01-23 RX ADMIN — DIPHENHYDRAMINE HYDROCHLORIDE 50 MG: 50 INJECTION, SOLUTION INTRAMUSCULAR; INTRAVENOUS at 20:56

## 2023-01-23 RX ADMIN — BENZONATATE 100 MG: 100 CAPSULE ORAL at 20:22

## 2023-01-23 RX ADMIN — INSULIN LISPRO 12 UNITS: 100 INJECTION, SOLUTION INTRAVENOUS; SUBCUTANEOUS at 13:32

## 2023-01-23 RX ADMIN — INSULIN LISPRO 8 UNITS: 100 INJECTION, SOLUTION INTRAVENOUS; SUBCUTANEOUS at 08:23

## 2023-01-23 RX ADMIN — ATORVASTATIN CALCIUM 40 MG: 40 TABLET, FILM COATED ORAL at 20:22

## 2023-01-23 RX ADMIN — INSULIN LISPRO 12 UNITS: 100 INJECTION, SOLUTION INTRAVENOUS; SUBCUTANEOUS at 18:37

## 2023-01-23 RX ADMIN — PANTOPRAZOLE SODIUM 40 MG: 40 TABLET, DELAYED RELEASE ORAL at 20:22

## 2023-01-23 RX ADMIN — AMINOPHYLLINE 100 MG: 25 INJECTION, SOLUTION INTRAVENOUS at 09:56

## 2023-01-23 RX ADMIN — ENOXAPARIN SODIUM 30 MG: 100 INJECTION SUBCUTANEOUS at 20:22

## 2023-01-23 RX ADMIN — GUAIFENESIN AND DEXTROMETHORPHAN 5 ML: 100; 10 SYRUP ORAL at 00:28

## 2023-01-23 RX ADMIN — METHYLPREDNISOLONE SODIUM SUCCINATE 125 MG: 125 INJECTION, POWDER, FOR SOLUTION INTRAMUSCULAR; INTRAVENOUS at 20:56

## 2023-01-23 RX ADMIN — BENZONATATE 100 MG: 100 CAPSULE ORAL at 11:57

## 2023-01-23 RX ADMIN — TETROFOSMIN 33.5 MILLICURIE: 1.38 INJECTION, POWDER, LYOPHILIZED, FOR SOLUTION INTRAVENOUS at 09:59

## 2023-01-23 ASSESSMENT — PAIN SCALES - GENERAL
PAINLEVEL_OUTOF10: 6
PAINLEVEL_OUTOF10: 4
PAINLEVEL_OUTOF10: 0
PAINLEVEL_OUTOF10: 4
PAINLEVEL_OUTOF10: 0

## 2023-01-23 ASSESSMENT — PAIN DESCRIPTION - ORIENTATION: ORIENTATION: ANTERIOR

## 2023-01-23 ASSESSMENT — PAIN - FUNCTIONAL ASSESSMENT: PAIN_FUNCTIONAL_ASSESSMENT: ACTIVITIES ARE NOT PREVENTED

## 2023-01-23 ASSESSMENT — PAIN DESCRIPTION - DESCRIPTORS: DESCRIPTORS: ACHING;STABBING

## 2023-01-23 ASSESSMENT — PAIN DESCRIPTION - LOCATION: LOCATION: ABDOMEN;CHEST;RIB CAGE

## 2023-01-23 NOTE — PROGRESS NOTES
Patient winded from walking from wheelchair to stress room, states she has gotten a chemical stress test in past.  Patient does not think she can do treadmill. A lexiscan stress test was completed on this patient. The patient tolerated the procedure well. Awaiting stress imaging at this time. Rest images will be done 1/24, floor RN aware.

## 2023-01-23 NOTE — ED PROVIDER NOTES
I independently performed a history and physical on Juan Crisostomo. All diagnostic, treatment, and disposition decisions were made by myself in conjunction with the advanced practice provider. For further details of [facility] emergency department encounter, please see SARIAH Rice's documentation. Patient reports that she developed chest pain around 3 PM and radiated to her jaw associated with shortness of breath with prompted presentation. She had COVID about 3 weeks ago and seemed to recover from that illness prior to the onset of this pain. She has history of coronary disease and was told at a hospital in [place] that she had an unstented bulb lesion. While in the ER lobby the patient became weak and collapsed to the floor. The nursing staff lowered her to the ground, she did not have a fall. I was at her side immediately and she did not have syncope either. She was able with assistance to stand back up and get into a ER bed. On arrival the patient was tachycardic and lungs were clear to auscultation bilaterally and abdomen benign. EKG  The Ekg interpreted by me shows  normal sinus rhythm with a rate of 89  Axis is   Left axis deviation  QTc is  normal  Intervals and Durations are unremarkable.       ST Segments: no acute change  No significant change from prior EKG dated 6 dec 2022      Labs Reviewed   CBC WITH AUTO DIFFERENTIAL - Abnormal; Notable for the following components:       Result Value    RBC 5.23 (*)     MCV 79.5 (*)     MCH 25.2 (*)     RDW 15.6 (*)     All other components within normal limits   COMPREHENSIVE METABOLIC PANEL W/ REFLEX TO MG FOR LOW K - Abnormal; Notable for the following components:    Sodium 133 (*)     Glucose 359 (*)     Alkaline Phosphatase 130 (*)     All other components within normal limits   LACTIC ACID - Abnormal; Notable for the following components:    Lactic Acid 3.4 (*)     All other components within normal limits   URINALYSIS WITH REFLEX TO CULTURE - Abnormal; Notable for the following components:    Glucose, Ur >=1000 (*)     All other components within normal limits   LACTIC ACID - Abnormal; Notable for the following components:    Lactic Acid 2.6 (*)     All other components within normal limits   TROPONIN   ETHANOL   URINE DRUG SCREEN   LACTIC ACID   TROPONIN   TROPONIN   CBC   BASIC METABOLIC PANEL W/ REFLEX TO MG FOR LOW K   LIPID PANEL   HCG, SERUM, QUALITATIVE   POCT GLUCOSE   POCT GLUCOSE     CT CHEST PULMONARY EMBOLISM W CONTRAST   Final Result   No evidence of pulmonary embolism or acute pulmonary abnormality. NM Cardiac Stress Test Nuclear Imaging    (Results Pending)         I personally saw this patient and performed a substantive portion of the visit including all aspects of the medical decision making.     MEDICAL DECISION MAKING  History From: History from : Patient  Limitations to history : None    Chronic Conditions: Coronary artery disease,  ED Medication Orders (From admission, onward)      Start Ordered     Status Ordering Provider    01/22/23 1930 01/22/23 1925  aspirin chewable tablet 324 mg  ONCE         Last MAR action: Given - by Toby Hollingsworth on 01/22/23 at [address] [place] [address]    01/22/23 1837 01/22/23 1837  iopamidol (ISOVUE-370) 76 % injection 75 mL  IMG ONCE PRN         Last MAR action: Given - by Evi Gamez on 01/22/23 at [address]    01/22/23 1730 01/22/23 1722  methylPREDNISolone sodium (SOLU-MEDROL) injection 125 mg  ONCE         Last MAR action: Given - by Corey Huntley on 01/22/23 at [address]    01/22/23 1730 01/22/23 1722  diphenhydrAMINE (BENADRYL) injection 50 mg  ONCE         Last MAR action: Given - by [facility]    01/22/23 1730 01/22/23 1722  lactated ringers bolus  ONCE         Last MAR action: New Bag - by Corey Huntley on 01/22/23 at [place]              CONSULTS: (Who and What was discussed)  IP CONSULT TO HOSPITALIST    Discussion with Other Profesionals : Admitting Team hospitalist, Dr. Aakash Seymour    Social Determinants : None    Records Reviewed : None    CC/HPI Summary, DDx, ED Course, and Reassessment: With patient's near syncopal episode and history of recent COVID and chest pain I was concerned for PE so CT was obtained which showed no pulmonary abnormality nor PE. Patient still has a history of coronary disease and I believe would benefit from hospitalization. Her pain is relieved after treatment received in the ER. Of note, the patient reported a history of an iodine allergy at least 16 years ago when she did not recall what had caused the allergy. After discussion of risks, benefits and alternatives we will forward with a CAT scan after pretreatment and the patient had no discernible reaction of any kind. Heart Score for chest pain patients  History: Moderately Suspicious  ECG: Normal  Patient Age: > 39 and < 65 years  Risk Factors: > 3 Risk factors or history of atherosclerotic disease*  Troponin: < 1X normal limit  Heart Score Total: 4      I am the Primary Physician of Record. I personally saw this patient and independently provided 20 minutes of non-concurrent critical care out of the total shared critical care time provided. As I have deemed necessary from their history, physical, and studies, I have considered and evaluated Daksha Sorto for the following diagnoses:  ACUTE CORONARY SYNDROME, PERICARDIAL TAMPONADE, PNEUMOTHORAX, PULMONARY EMBOLISM, and THORACIC DISSECTION. FINAL IMPRESSION  1. Chest pain, unspecified type    2. Near syncope    3. Hyperglycemia    4. Lactic acidosis        Vitals:  Blood pressure 133/80, pulse 82, temperature 98.1 °F (36.7 °C), temperature source Oral, resp. rate 16, height 5' (1.524 m), weight 225 lb (102.1 kg), SpO2 94 %.        Susan Gramajo MD  01/22/23 2419       Susan Gramajo MD  01/22/23 4279

## 2023-01-23 NOTE — H&P
Hospital Medicine History & Physical      PCP: Gary Becerra DO    Date of Admission: 1/22/2023    Date of Service: Pt seen/examined on 1/22/2023 and Admitted to observation with expected LOS less than two midnights due to medical therapy. Chief Complaint: Chest pain    History Of Present Illness:      50 y.o. female, with past medical history of hypertension, CAD, hyperlipidemia, morbid obesity and diabetes, who presented to [facility] with chest pain. History obtained from the patient and review of EMR. The patient stated today around 3 pm she began to endorse mid sternal chest pain while helping her  put together a television. She stated the pain radiated to her Jaw and was associated with shortness of breath and sweating. The patient stated she did take a baby ASA when the pain began and did have some relief with it. She stated she had COVID roughly 3 weeks ago and has not felt \"normal\" since. The patient stated she recently moved here to [place] from [place] 1 year ago. She stated while in [place], roughly 2021, she did have an angiogram at one time that showed \"blockage in my heart arteries\". However they were unable to place any stents \"because Dr. Gideon Pickett my arteries were too narrow\". The patient does not known what arteries. In the emergency room, the patient had a negative troponin as well as a nonischemic EKG. A CT chest pulmonary embolism was also obtained that revealed no evidence of pulmonary embolism or acute pulmonary abnormality. She was given ASA. The patient was admitted for further evaluation and treatment. A stress test and ECHO have been ordered for the am. The patient denied any other associated symptoms as well as any aggravating and/or alleviating factors. At the time of this assessment, the patient was resting comfortably in bed. She currently denies any chest pain, back pain, abdominal pain, shortness of breath, numbness, tingling, N/V/C/D, fever and/or chills. Of note, Per EMR, the patient had a near syncopal episode while in the ED. Nursing staff assisted the patient to the floor. There was no LOC and the patient denies hitting her head. Past Medical History:          Diagnosis Date    Diabetes mellitus ([address]     Headache     Hyperlipidemia     TIA (transient ischemic attack)     ,  per pt     Past Surgical History:          Procedure Laterality Date     SECTION      CHOLECYSTECTOMY      LEG SURGERY Left 10/28/2022    LEFT VANI  DEBRIDEMENT INCISION AND DRAINAGE performed by Kassidy Rivas MD at [facility]       Medications Prior to Admission:      Prior to Admission medications    Medication Sig Start Date End Date Taking? Authorizing Provider   metFORMIN (GLUCOPHAGE-XR) 500 MG extended release tablet Take 2 tablets by mouth daily (with breakfast) 23   María Roy DO   losartan (COZAAR) 25 MG tablet Take 1 tablet by mouth daily 23   María Roy DO   ASPIRIN LOW DOSE 81 MG EC tablet TAKE 1 TABLET BY MOUTH EVERY DAY 22   María Roy DO   diclofenac (VOLTAREN) 75 MG EC tablet TAKE 1 TABLET BY MOUTH TWICE A DAY 22   María Roy DO   omeprazole (PRILOSEC) 40 MG delayed release capsule Take 1 capsule by mouth every morning (before breakfast) 22   María Roy DO   atorvastatin (LIPITOR) 40 MG tablet Take 1 tablet by mouth daily 22   María Roy DO   cyclobenzaprine (FLEXERIL) 10 MG tablet Take 10 mg by mouth 3 times daily as needed  Patient not taking: Reported on 2023 3/4/21   Historical Provider, MD   blood glucose test strips (ASCENSIA AUTODISC VI;ONE TOUCH ULTRA TEST VI) strip TEST BLOOD SUGARS ONCE A DAY EVERY MORNING 22   María Roy DO   empagliflozin (JARDIANCE) 25 MG tablet Take 1 tablet by mouth daily 22  María Roy DO     Allergies:   Iv contrast [iodides]    Social History:      The patient currently lives at home    TOBACCO:   reports that she has never smoked. She has never used smokeless tobacco.  ETOH:   reports no history of alcohol use. E-cigarette/Vaping       Questions Responses    E-cigarette/Vaping Use     Start Date     Passive Exposure     Quit Date     Counseling Given     Comments           Family History:      Reviewed and negative in regards to presenting illness/complaint. Problem Relation Age of Onset    Diabetes Mother     Diabetes Brother     Diabetes Brother     Breast Cancer Paternal Grandmother     Heart Disease Paternal Grandfather      REVIEW OF SYSTEMS COMPLETED:   Pertinent positives as noted in the HPI. All other systems reviewed and negative. PHYSICAL EXAM PERFORMED:    BP (P) 111/72   Pulse 99   Temp 97.9 °F (36.6 °C) (Oral)   Resp 17   Ht 5' (1.524 m)   Wt 225 lb (102.1 kg)   SpO2 97%   BMI 43.94 kg/m²     General appearance: Pleasant, obese female in no apparent distress, appears stated age and cooperative. HEENT:   Pupils equal, round, and reactive to light. Extra ocular muscles intact. Conjunctivae/corneas clear. Neck: Supple, with full range of motion. No jugular venous distention. Trachea midline. Respiratory:  Normal respiratory effort. Clear to auscultation, bilaterally without Rales/Wheezes/Rhonchi. Cardiovascular:  Regular rate and rhythm with normal S1/S2 without murmurs, rubs or gallops. Abdomen: Soft, obese, round non-tender, non-distended with normal bowel sounds. Musculoskeletal:  No clubbing, cyanosis or edema bilaterally. Full range of motion without deformity. Skin: Skin color, texture, turgor normal.  No significant rashes or lesions.   Neurologic:  Neurovascularly intact Cranial nerves: II-XII intact, grossly non-focal.  Psychiatric:  Alert and oriented, thought content appropriate, normal insight  Capillary Refill: Brisk,3 seconds, normal  Peripheral Pulses: +2 palpable, equal bilaterally     Labs:     Recent Labs     01/22/23  1726   WBC 8.9 HGB 13.2   HCT 41.5        Recent Labs     01/22/23  1726   *   K 3.9      CO2 21   BUN 11   CREATININE 0.6   CALCIUM 9.1     Recent Labs     01/22/23  1726   AST 15   ALT 18   BILITOT 0.3   ALKPHOS 130*     Recent Labs     01/22/23  1726   TROPONINI <0.01     Urinalysis:      Lab Results   Component Value Date/Time    NITRU Negative 01/22/2023 07:05 PM    BLOODU Negative 01/22/2023 07:05 PM    SPECGRAV <=1.005 01/22/2023 07:05 PM    GLUCOSEU >=1000 01/22/2023 07:05 PM     Radiology:     CXR: I have reviewed the CXR with the following interpretation: N/A    EKG:  I have reviewed the EKG with the following interpretation: The Ekg interpreted in the absence of a cardiologist shows normal sinus rhythm, rate 89. No ST elevation and/or depression noted    CT CHEST PULMONARY EMBOLISM W CONTRAST   Final Result   No evidence of pulmonary embolism or acute pulmonary abnormality. Consults:    IP CONSULT TO HOSPITALIST    ASSESSMENT:    Active Hospital Problems    Diagnosis Date Noted    Chest pain [R07.9] 01/22/2023     Priority: Medium    Hyperlipidemia [E78.5] 12/13/2022     Priority: Medium    Primary hypertension [I10] 12/13/2022     Priority: Medium    Class 3 severe obesity with serious comorbidity and body mass index (BMI) of 40.0 to 44.9 in adult [facility]) [E66.01, Z68.41] 10/14/2022     Priority: Medium    Type 2 diabetes mellitus without complication, with long-term current use of insulin (HCC) [E11.9, Z79.4] 10/14/2022     Priority: Medium     PLAN:    Chest pain in setting of known CAD  -atypical  -serial troponin - initial negative  -EKG w/o ischemic changes  -CT chest pulmonary embolism revealed: No evidence of pulmonary embolism or acute pulmonary abnormality  -Aspirin given in ED  -Benadryl given in ED  -2 L LR given in ED  -FLP in am  -NPO after MN  -stress in am  -echo in am  -prn nitro  -tele monitoring    Morbid obesity  With Body mass index is 43.9 kg/m².  Complicating assessment and treatment. Placing patient at risk for multiple co-morbidities as well as early death and contributing to the patient's presentation. Counseled on weight loss    Essential hypertension  -Continue losartan    Hyperlipidemia  -Continue a atorvastatin    DM2, uncontrolled  -  -hemglobin a1c 10.1 on 1/19/2023  -hold home metformin  -hdssi  -poct ac/hs  -hypoglycemia protocol  -carb control diet    Lactic acidosis of unknown etiology  -May be secondary to metformin use  -IV fluids given in ED  -Repeat lactic  -No signs or symptoms of sepsis     DVT Prophylaxis: Lovenox    Diet: No diet orders on file    Code Status: Prior    PT/OT Eval Status: No indication for need at this time    Dispo -1-2 days pending clinical improvement     JOHANNY Balderas - CNP    Thank you Albert Nam DO for the opportunity to be involved in this patient's care.  If you have any questions or concerns please feel free to contact me at [phone].  ----------------------Anticipated Dr. Deven couch----------------------------

## 2023-01-23 NOTE — CARE COORDINATION
Chart reviewed for possible dc needs. Patient has PCP, works full time at [employer]; has spouse and medicaid coverage. Likely no needs but will follow.       LG

## 2023-01-23 NOTE — PROGRESS NOTES
Hospitalist Progress Note      PCP: Alonzo Mills DO    Date of Admission: 1/22/2023    Chief Complaint: Chest pain     History Of Present Illness:       50 y.o. female, with past medical history of hypertension, CAD, hyperlipidemia, morbid obesity and diabetes, who presented to [facility] with chest pain. History obtained from the patient and review of EMR. The patient stated today around 3 pm she began to endorse mid sternal chest pain while helping her  put together a television. She stated the pain radiated to her Jaw and was associated with shortness of breath and sweating. The patient stated she did take a baby ASA when the pain began and did have some relief with it. She stated she had COVID roughly 3 weeks ago and has not felt \"normal\" since. The patient stated she recently moved here to [place] from [place] 1 year ago. She stated while in [place], roughly 2021, she did have an angiogram at one time that showed \"blockage in my heart arteries\". However they were unable to place any stents \"because Dr. Boogie Schwarzns my arteries were too narrow\". The patient does not known what arteries. In the emergency room, the patient had a negative troponin as well as a nonischemic EKG. A CT chest pulmonary embolism was also obtained that revealed no evidence of pulmonary embolism or acute pulmonary abnormality. She was given ASA. The patient was admitted for further evaluation and treatment. A stress test and ECHO have been ordered for the am. The patient denied any other associated symptoms as well as any aggravating and/or alleviating factors. At the time of this assessment, the patient was resting comfortably in bed. She currently denies any chest pain, back pain, abdominal pain, shortness of breath, numbness, tingling, N/V/C/D, fever and/or chills. Subjective:   Pt had stress test this am.   Denies chest pain.  Pt felt palpitation during the test.        Medications:  Reviewed    Infusion Medications    sodium chloride      dextrose       Scheduled Medications    aspirin  81 mg Oral Daily    atorvastatin  40 mg Oral Daily    losartan  25 mg Oral Daily    pantoprazole  40 mg Oral QAM AC    sodium chloride flush  5-40 mL IntraVENous 2 times per day    insulin lispro  0-16 Units SubCUTAneous TID WC    insulin lispro  0-4 Units SubCUTAneous Nightly    enoxaparin  40 mg SubCUTAneous Daily     PRN Meds: sodium chloride flush, sodium chloride, ondansetron **OR** ondansetron, acetaminophen **OR** acetaminophen, polyethylene glycol, glucose, dextrose bolus **OR** dextrose bolus, glucagon (rDNA), dextrose, perflutren lipid microspheres, nitroGLYCERIN, guaiFENesin-dextromethorphan    No intake or output data in the 24 hours ending 01/23/23 1133    Physical Exam Performed:    /85   Pulse 95   Temp 98 °F (36.7 °C) (Oral)   Resp 16   Ht 5' (1.524 m)   Wt 225 lb (102.1 kg)   SpO2 92%   BMI 43.94 kg/m²     General appearance: No apparent distress, appears stated age and cooperative. HEENT: Pupils equal, round, and reactive to light. Conjunctivae/corneas clear. Neck: Supple, with full range of motion. No jugular venous distention. Trachea midline. Respiratory:  Normal respiratory effort. Clear to auscultation, bilaterally without Rales/Wheezes/Rhonchi. Cardiovascular: Regular rate and rhythm with normal S1/S2 without murmurs, rubs or gallops. Abdomen: Soft, non-tender, non-distended with normal bowel sounds. Musculoskeletal: No clubbing, cyanosis or edema bilaterally. Full range of motion without deformity. Skin: Skin color, texture, turgor normal.  No rashes or lesions. Neurologic:  Neurovascularly intact without any focal sensory/motor deficits.  Cranial nerves: II-XII intact, grossly non-focal.  Psychiatric: Alert and oriented, thought content appropriate, normal insight  Capillary Refill: Brisk, 3 seconds, normal   Peripheral Pulses: +2 palpable, equal bilaterally       Labs:   Recent Labs 01/22/23  1726 01/23/23  0613   WBC 8.9 11.1*   HGB 13.2 13.2   HCT 41.5 40.5    414     Recent Labs     01/22/23  1726 01/23/23  0613   * 137   K 3.9 3.8    106   CO2 21 21   BUN 11 12   CREATININE 0.6 0.7   CALCIUM 9.1 9.3     Recent Labs     01/22/23  1726   AST 15   ALT 18   BILITOT 0.3   ALKPHOS 130*     No results for input(s): INR in the last 72 hours. Recent Labs     01/22/23  1726 01/22/23  2102 01/22/23  2346   TROPONINI <0.01 <0.01 <0.01       Urinalysis:      Lab Results   Component Value Date/Time    NITRU Negative 01/22/2023 07:05 PM    BLOODU Negative 01/22/2023 07:05 PM    SPECGRAV <=1.005 01/22/2023 07:05 PM    GLUCOSEU >=1000 01/22/2023 07:05 PM       Radiology:  CT CHEST PULMONARY EMBOLISM W CONTRAST   Final Result   No evidence of pulmonary embolism or acute pulmonary abnormality. NM Cardiac Stress Test Nuclear Imaging    (Results Pending)       IP CONSULT TO HOSPITALIST    Assessment/Plan:    Active Hospital Problems    Diagnosis     Chest pain [R07.9]      Priority: Medium    Hyperlipidemia [E78.5]      Priority: Medium    Primary hypertension [I10]      Priority: Medium    Class 3 severe obesity with serious comorbidity and body mass index (BMI) of 40.0 to 44.9 in adult ([facility]) [E66.01, Z68.41]      Priority: Medium    Type 2 diabetes mellitus without complication, with long-term current use of insulin ([facility]) [E11.9, Z79.4]      Priority: Medium     Chest pain in setting of known CAD  -atypical  -serial troponin - initial negative  -EKG w/o ischemic changes  -CT chest pulmonary embolism revealed: No evidence of pulmonary embolism or acute pulmonary abnormality  -Aspirin given in ED  -Benadryl given in ED  -2 L LR given in ED  -FLP in am  -NPO after MN  -stress in am  -echo in am  -prn nitro  -tele monitoring     Morbid obesity  With Body mass index is 43.9 kg/m². Complicating assessment and treatment.  Placing patient at risk for multiple co-morbidities as well as early death and contributing to the patient's presentation.  Counseled on weight loss     Essential hypertension  -Continue losartan     Hyperlipidemia  -Continue a atorvastatin     DM2, uncontrolled  -  -hemglobin a1c 10.1 on 1/19/2023  -hold home metformin  -hdssi  -poct ac/hs  -hypoglycemia protocol  -carb control diet     Lactic acidosis of unknown etiology  -May be secondary to metformin use  -IV fluids given in ED  -Repeat lactic  -No signs or symptoms of sepsis     DVT Prophylaxis: Lovenox     Diet: No diet orders on file     Code Status: Prior     PT/OT Eval Status: No indication for need at this time     [address] home after stress test       Dunia Ontiveros MD

## 2023-01-24 ENCOUNTER — APPOINTMENT (OUTPATIENT)
Dept: NUCLEAR MEDICINE | Age: 53
DRG: 191 | End: 2023-01-24
Payer: MEDICAID

## 2023-01-24 LAB
A/G RATIO: 1.3 (ref 1.1–2.2)
ALBUMIN SERPL-MCNC: 3.8 G/DL (ref 3.4–5)
ALP BLD-CCNC: 128 U/L (ref 40–129)
ALT SERPL-CCNC: 15 U/L (ref 10–40)
ANION GAP SERPL CALCULATED.3IONS-SCNC: 13 MMOL/L (ref 3–16)
AST SERPL-CCNC: 11 U/L (ref 15–37)
BASOPHILS ABSOLUTE: 0 K/UL (ref 0–0.2)
BASOPHILS RELATIVE PERCENT: 0.1 %
BILIRUB SERPL-MCNC: <0.2 MG/DL (ref 0–1)
BUN BLDV-MCNC: 15 MG/DL (ref 7–20)
CALCIUM SERPL-MCNC: 9.2 MG/DL (ref 8.3–10.6)
CHLORIDE BLD-SCNC: 101 MMOL/L (ref 99–110)
CO2: 21 MMOL/L (ref 21–32)
CREAT SERPL-MCNC: 0.7 MG/DL (ref 0.6–1.1)
EOSINOPHILS ABSOLUTE: 0 K/UL (ref 0–0.6)
EOSINOPHILS RELATIVE PERCENT: 0 %
GFR SERPL CREATININE-BSD FRML MDRD: >60 ML/MIN/{1.73_M2}
GLUCOSE BLD-MCNC: 258 MG/DL (ref 70–99)
GLUCOSE BLD-MCNC: 290 MG/DL (ref 70–99)
GLUCOSE BLD-MCNC: 317 MG/DL (ref 70–99)
GLUCOSE BLD-MCNC: 328 MG/DL (ref 70–99)
GLUCOSE BLD-MCNC: 344 MG/DL (ref 70–99)
HCT VFR BLD CALC: 39.1 % (ref 36–48)
HEMOGLOBIN: 12.7 G/DL (ref 12–16)
LYMPHOCYTES ABSOLUTE: 1.1 K/UL (ref 1–5.1)
LYMPHOCYTES RELATIVE PERCENT: 9 %
MCH RBC QN AUTO: 26 PG (ref 26–34)
MCHC RBC AUTO-ENTMCNC: 32.6 G/DL (ref 31–36)
MCV RBC AUTO: 79.8 FL (ref 80–100)
MONOCYTES ABSOLUTE: 0.1 K/UL (ref 0–1.3)
MONOCYTES RELATIVE PERCENT: 0.7 %
NEUTROPHILS ABSOLUTE: 10.5 K/UL (ref 1.7–7.7)
NEUTROPHILS RELATIVE PERCENT: 90.2 %
PDW BLD-RTO: 15.8 % (ref 12.4–15.4)
PERFORMED ON: ABNORMAL
PLATELET # BLD: 410 K/UL (ref 135–450)
PMV BLD AUTO: 7.4 FL (ref 5–10.5)
POTASSIUM REFLEX MAGNESIUM: 4.2 MMOL/L (ref 3.5–5.1)
RBC # BLD: 4.9 M/UL (ref 4–5.2)
SODIUM BLD-SCNC: 135 MMOL/L (ref 136–145)
TOTAL PROTEIN: 6.7 G/DL (ref 6.4–8.2)
WBC # BLD: 11.6 K/UL (ref 4–11)

## 2023-01-24 PROCEDURE — A9502 TC99M TETROFOSMIN: HCPCS | Performed by: NURSE PRACTITIONER

## 2023-01-24 PROCEDURE — 80053 COMPREHEN METABOLIC PANEL: CPT

## 2023-01-24 PROCEDURE — 3430000000 HC RX DIAGNOSTIC RADIOPHARMACEUTICAL: Performed by: NURSE PRACTITIONER

## 2023-01-24 PROCEDURE — 6370000000 HC RX 637 (ALT 250 FOR IP): Performed by: INTERNAL MEDICINE

## 2023-01-24 PROCEDURE — 1200000000 HC SEMI PRIVATE

## 2023-01-24 PROCEDURE — 6370000000 HC RX 637 (ALT 250 FOR IP)

## 2023-01-24 PROCEDURE — 6370000000 HC RX 637 (ALT 250 FOR IP): Performed by: NURSE PRACTITIONER

## 2023-01-24 PROCEDURE — 36415 COLL VENOUS BLD VENIPUNCTURE: CPT

## 2023-01-24 PROCEDURE — 2580000003 HC RX 258: Performed by: NURSE PRACTITIONER

## 2023-01-24 PROCEDURE — 6360000002 HC RX W HCPCS: Performed by: INTERNAL MEDICINE

## 2023-01-24 PROCEDURE — 85025 COMPLETE CBC W/AUTO DIFF WBC: CPT

## 2023-01-24 RX ORDER — BENZONATATE 100 MG/1
100 CAPSULE ORAL 2 TIMES DAILY PRN
Qty: 14 CAPSULE | Refills: 0 | Status: CANCELLED | OUTPATIENT
Start: 2023-01-24 | End: 2023-01-31

## 2023-01-24 RX ORDER — INSULIN GLARGINE 100 [IU]/ML
10 INJECTION, SOLUTION SUBCUTANEOUS NIGHTLY
Status: DISCONTINUED | OUTPATIENT
Start: 2023-01-24 | End: 2023-01-24

## 2023-01-24 RX ORDER — INSULIN GLARGINE 100 [IU]/ML
20 INJECTION, SOLUTION SUBCUTANEOUS NIGHTLY
Status: DISCONTINUED | OUTPATIENT
Start: 2023-01-25 | End: 2023-01-26 | Stop reason: HOSPADM

## 2023-01-24 RX ORDER — INSULIN LISPRO 100 [IU]/ML
0-8 INJECTION, SOLUTION INTRAVENOUS; SUBCUTANEOUS
Status: DISCONTINUED | OUTPATIENT
Start: 2023-01-24 | End: 2023-01-26 | Stop reason: HOSPADM

## 2023-01-24 RX ORDER — INSULIN LISPRO 100 [IU]/ML
0-4 INJECTION, SOLUTION INTRAVENOUS; SUBCUTANEOUS NIGHTLY
Status: DISCONTINUED | OUTPATIENT
Start: 2023-01-24 | End: 2023-01-26 | Stop reason: HOSPADM

## 2023-01-24 RX ORDER — INSULIN LISPRO 100 [IU]/ML
0-4 INJECTION, SOLUTION INTRAVENOUS; SUBCUTANEOUS
Status: DISCONTINUED | OUTPATIENT
Start: 2023-01-24 | End: 2023-01-24

## 2023-01-24 RX ORDER — INSULIN LISPRO 100 [IU]/ML
0-8 INJECTION, SOLUTION INTRAVENOUS; SUBCUTANEOUS
Status: DISCONTINUED | OUTPATIENT
Start: 2023-01-25 | End: 2023-01-24

## 2023-01-24 RX ADMIN — BENZONATATE 100 MG: 100 CAPSULE ORAL at 20:46

## 2023-01-24 RX ADMIN — INSULIN LISPRO 3 UNITS: 100 INJECTION, SOLUTION INTRAVENOUS; SUBCUTANEOUS at 17:15

## 2023-01-24 RX ADMIN — ATORVASTATIN CALCIUM 40 MG: 40 TABLET, FILM COATED ORAL at 20:46

## 2023-01-24 RX ADMIN — INSULIN LISPRO 4 UNITS: 100 INJECTION, SOLUTION INTRAVENOUS; SUBCUTANEOUS at 20:50

## 2023-01-24 RX ADMIN — PANTOPRAZOLE SODIUM 40 MG: 40 TABLET, DELAYED RELEASE ORAL at 20:46

## 2023-01-24 RX ADMIN — Medication 10 ML: at 08:53

## 2023-01-24 RX ADMIN — LOSARTAN POTASSIUM 25 MG: 25 TABLET, FILM COATED ORAL at 20:46

## 2023-01-24 RX ADMIN — Medication 10 ML: at 20:47

## 2023-01-24 RX ADMIN — ENOXAPARIN SODIUM 30 MG: 100 INJECTION SUBCUTANEOUS at 20:46

## 2023-01-24 RX ADMIN — BENZONATATE 100 MG: 100 CAPSULE ORAL at 16:34

## 2023-01-24 RX ADMIN — INSULIN GLARGINE 10 UNITS: 100 INJECTION, SOLUTION SUBCUTANEOUS at 08:50

## 2023-01-24 RX ADMIN — TETROFOSMIN 33.7 MILLICURIE: 1.38 INJECTION, POWDER, LYOPHILIZED, FOR SOLUTION INTRAVENOUS at 12:08

## 2023-01-24 RX ADMIN — BENZONATATE 100 MG: 100 CAPSULE ORAL at 08:50

## 2023-01-24 ASSESSMENT — PAIN DESCRIPTION - ORIENTATION: ORIENTATION: MID

## 2023-01-24 ASSESSMENT — PAIN SCALES - GENERAL
PAINLEVEL_OUTOF10: 2
PAINLEVEL_OUTOF10: 4
PAINLEVEL_OUTOF10: 4

## 2023-01-24 ASSESSMENT — PAIN DESCRIPTION - DESCRIPTORS
DESCRIPTORS: ACHING;BURNING
DESCRIPTORS: ACHING

## 2023-01-24 ASSESSMENT — PAIN DESCRIPTION - LOCATION
LOCATION: THROAT;HEAD
LOCATION: HEAD

## 2023-01-24 NOTE — DISCHARGE INSTR - MEDS
Continue 1000 mg Metformin  Continue jardiance    Take 20 units of Lantus nightly    Check glucose 4 times daily:  1) morning on empty stomach  2) before lunch  3) before dinner  4) at bedtime    Then use Humulin R as follows:     For glucose of : 0 units  Glucose of 200-249: 1 unit  Glucose of 250-299: 2 units  Glucose of 300-349: 3 units  Glucose above 349: 4 units

## 2023-01-24 NOTE — PROGRESS NOTES
[facility] notified patient transporting to Nuclear Medicine for stress test. Electronically signed by Jalen Hooker RN on 1/24/23 at 12:14 PM EST     [facility] notified of patient return to unit.  Electronically signed by Jalen Hooker RN on 1/24/23 at 12:55 PM EST

## 2023-01-24 NOTE — PROGRESS NOTES
Verbal orders from Dr. Ernestina Bobby to place 4 carb choice diet on patient prior to stress test resulting.  Electronically signed by Cr Tamez RN on 1/24/23 at 1:39 PM EST

## 2023-01-24 NOTE — PROGRESS NOTES
Patient transferred to room 219. Report given to AVA Raymundo. Patient belongings at bedside during transfer. Patient spouse update on transfer along with [facility]. Patient informed Cardiology consulted and she will be staying in house overnight.  Electronically signed by Christine Posey RN on 1/24/23 at 5:28 PM EST

## 2023-01-24 NOTE — PROGRESS NOTES
Progress Note      PCP: Nikolas Jacob DO    Date of Admission: 1/22/2023    Chief Complaint: chest pain    Chief Complaint   Patient presents with    Jaw Pain     Pt began at 3 pm with jaw and chest pain   + short of breath. Recent covid +      Hospital Course: Morris Dunham is a 73-year-old female with history of HTN, T2DM, hyperlipidemia, left lower abdominal hernia, history of TIAs, and class III obesity who presented to [facility] with chest pain on 1/22 which started around 3pm that day. She reported mid sternal chest pain while helping her  put together a television. She reports the pain radiated to her jaw and was associated with sweating and shortness of breath. She did take a 81 mg aspirin after pain onset and did have some relief. She reported she received an angiogram in [place] in 2021 revealing some vessel blockage but they did not place a stent \"because the doctor said my arteries were too narrow. \" She is unsure of which arteries. Negative troponin in the ED, EKG revealing  left axis deviation with T wave inversion in precordial leads concerning for possible ischemia. CT chest was negative for pulmonary embolism. She was admitted for further evaluation    Subjective: Patient was seen today after cardiac stress test. Denies fever, chills, chest pain, SOA, N/V. Endorses worsening dry cough, denies hemoptysis. She is anxious to go home. Her cardiac pharmacologic stress test revealed abnormal myocardial perfusion, with findings consistent with ischemia in territory of LAD.  Cardiology consulted    Medications:  Reviewed    Infusion Medications    sodium chloride      dextrose       Scheduled Medications    insulin lispro  0-4 Units SubCUTAneous TID     insulin lispro  0-4 Units SubCUTAneous Nightly    [START ON 1/25/2023] insulin glargine  20 Units SubCUTAneous Nightly    benzonatate  100 mg Oral TID    enoxaparin  30 mg SubCUTAneous BID    aspirin  81 mg Oral Daily    atorvastatin  40 mg Oral Daily    losartan  25 mg Oral Daily    pantoprazole  40 mg Oral QAM AC    sodium chloride flush  5-40 mL IntraVENous 2 times per day     PRN Meds: sodium chloride flush, sodium chloride, ondansetron **OR** ondansetron, acetaminophen **OR** acetaminophen, polyethylene glycol, glucose, dextrose bolus **OR** dextrose bolus, glucagon (rDNA), dextrose, perflutren lipid microspheres, nitroGLYCERIN, guaiFENesin-dextromethorphan      Intake/Output Summary (Last 24 hours) at 1/24/2023 1725  Last data filed at 1/24/2023 1400  Gross per 24 hour   Intake 240 ml   Output --   Net 240 ml       Physical Exam Performed:    BP (!) 146/75   Pulse 87   Temp 98 °F (36.7 °C) (Oral)   Resp 16   Ht 5' (1.524 m)   Wt 225 lb (102.1 kg)   SpO2 97%   BMI 43.94 kg/m²     General appearance: No apparent distress, obese female, appears stated age and cooperative. HEENT: Conjunctivae/corneas clear. Neck: Supple, with full range of motion. No jugular venous distention. Trachea midline. Respiratory:  Normal respiratory effort. Decreased breath sounds, bilaterally without Rales/Wheezes/Rhonchi. Cardiovascular: Regular rate and rhythm with without murmurs, rubs or gallops. Abdomen: Soft, non-tender, non-distended with normal bowel sounds. Musculoskeletal: No clubbing, cyanosis or edema bilaterally. Full range of motion without deformity. Skin: Skin color, texture, turgor normal.  No rashes or lesions. Neurologic:  Neurovascularly intact without any focal sensory/motor deficits.    Psychiatric: Alert and oriented, thought content appropriate, normal insight  Capillary Refill: Brisk,3 seconds, normal   Peripheral Pulses: +2 palpable, equal bilaterally       Labs:   Recent Labs     01/22/23  1726 01/23/23  0613 01/24/23  0525   WBC 8.9 11.1* 11.6*   HGB 13.2 13.2 12.7   HCT 41.5 40.5 39.1    414 410     Recent Labs     01/22/23  1726 01/23/23  0613 01/24/23  0525   * 137 135*   K 3.9 3.8 4.2    106 101   CO2 21 21 21   BUN 11 12 15   CREATININE 0.6 0.7 0.7   CALCIUM 9.1 9.3 9.2     Recent Labs     01/22/23  1726 01/24/23  0525   AST 15 11*   ALT 18 15   BILITOT 0.3 <0.2   ALKPHOS 130* 128     No results for input(s): INR in the last 72 hours. Recent Labs     01/22/23  1726 01/22/23  2102 01/22/23  2346   TROPONINI <0.01 <0.01 <0.01       Urinalysis:      Lab Results   Component Value Date/Time    NITRU Negative 01/22/2023 07:05 PM    BLOODU Negative 01/22/2023 07:05 PM    SPECGRAV <=1.005 01/22/2023 07:05 PM    GLUCOSEU >=1000 01/22/2023 07:05 PM       Radiology:  NM Cardiac Stress Test Nuclear Imaging   Final Result      CT SOFT TISSUE NECK W CONTRAST   Final Result   Mildly enlarged right level 2 lymph nodes, nonspecific and may be reactive in   the setting of infection. Otherwise no acute abnormality of the soft tissue   structures of the neck. US THYROID   Preliminary Result   NODULE Left mid: ACR TI-RADS TR3:      Recommend: Follow-up ultrasound in 1 year. ACR TI-RADS recommendations:      TR3 (3 points):  FNA if >= 2.5 cm; follow-up if 1.5-2.4 cm in 1, 3, and 5   years         CT CHEST PULMONARY EMBOLISM W CONTRAST   Final Result   No evidence of pulmonary embolism or acute pulmonary abnormality. Assessment/Plan:    Active Hospital Problems    Diagnosis     Chest pain [R07.9]      Priority: Medium    Hyperlipidemia [E78.5]      Priority: Medium    Primary hypertension [I10]      Priority: Medium    Class 3 severe obesity with serious comorbidity and body mass index (BMI) of 40.0 to 44.9 in adult ([facility] [address] [E66.01, Z68.41]      Priority: Medium    Type 2 diabetes mellitus without complication, with long-term current use of insulin (HCC) [E11.9, Z79.4]      Priority: Medium     Chest Pain in the setting of known CAD  Myocardial Perfusion Defect  -Pharmacologic stress test today revealing abnormal perfusion consistent with LAD ischemia  Summary    Abnormal myocardial perfusion study.     There is a moderate-sized, reversible mid-anterior to apical,    mid-anteroseptal, apical septal, apical inferior, and apical lateral defect,    consistent with ischemia in the territory of the LAD. There is also a basal to mid-inferior defect that corrects on the    attenuation correction images and is favored to be secondary to attenuation    artifact. Normal left ventricular systolic function with calculated LVEF >65%. Overall findings represent an intermediate-high risk scan.   -serial troponins negative  -EKG in the ED revealing T wave inversion in precordial leads  -CT chest negative for PE or acute pulmonary abnormality  -cardiology consulted  -continue telemetry    T2DM, poorly controlled  -A1C 10.1 on 1/19  -blood glucose 359 on admit  -POC glucose checks 4 times daily  -MDSSI  -start lantus 20 units nightly  -hold home metformin  -hypoglycemia protocol  -carb restricted diet    Essential Hypertension  -well controlled  -continue losartan 25 mg    Hyperlipidemia  -controlled  -continue atorvastatin 40 mg daily    DVT Prophylaxis: lovenox  Diet: ADULT DIET; Regular; 4 carb choices (60 gm/meal)  Code Status: Full Code    PT/OT Eval Status: not ordered    Dispo - pending clinical improvement    Julissa Muñoz DO, PGY-1   [facility]      Addendum to Resident Progress note:  Pt seen,examined and evaluated with resident and discussed regarding POC . I have reviewed the current history, physical findings, labs and assessment and plan , edited the note where appropriate and agree with note as documented by resident DO ( Diamond Wasserman)    Patient seen and examined . Admitted for CP and currently CP free . Had NM stress test , which was abnormal with reversible perfusion defect in mid LAD  Also patient has Uncontrolled Diabetes. A1C 10.5% . Will increase /start on Lantus 20 units nightly and cover with SSI . Medication compliance advised .  Will get cardiology consult for possible need for LHC .  Will follow     Marycarmen Booker MD  Hospitalist Physician

## 2023-01-25 LAB
A/G RATIO: 1.5 (ref 1.1–2.2)
ALBUMIN SERPL-MCNC: 4.3 G/DL (ref 3.4–5)
ALP BLD-CCNC: 112 U/L (ref 40–129)
ALT SERPL-CCNC: 15 U/L (ref 10–40)
ANION GAP SERPL CALCULATED.3IONS-SCNC: 10 MMOL/L (ref 3–16)
ANION GAP SERPL CALCULATED.3IONS-SCNC: 11 MMOL/L (ref 3–16)
AST SERPL-CCNC: 10 U/L (ref 15–37)
BASOPHILS ABSOLUTE: 0 K/UL (ref 0–0.2)
BASOPHILS RELATIVE PERCENT: 0.4 %
BILIRUB SERPL-MCNC: 0.3 MG/DL (ref 0–1)
BUN BLDV-MCNC: 15 MG/DL (ref 7–20)
BUN BLDV-MCNC: 17 MG/DL (ref 7–20)
CALCIUM SERPL-MCNC: 8.9 MG/DL (ref 8.3–10.6)
CALCIUM SERPL-MCNC: 9.2 MG/DL (ref 8.3–10.6)
CHLORIDE BLD-SCNC: 101 MMOL/L (ref 99–110)
CHLORIDE BLD-SCNC: 101 MMOL/L (ref 99–110)
CO2: 22 MMOL/L (ref 21–32)
CO2: 25 MMOL/L (ref 21–32)
CREAT SERPL-MCNC: 0.6 MG/DL (ref 0.6–1.1)
CREAT SERPL-MCNC: 0.6 MG/DL (ref 0.6–1.1)
EOSINOPHILS ABSOLUTE: 0 K/UL (ref 0–0.6)
EOSINOPHILS RELATIVE PERCENT: 0.6 %
GFR SERPL CREATININE-BSD FRML MDRD: >60 ML/MIN/{1.73_M2}
GFR SERPL CREATININE-BSD FRML MDRD: >60 ML/MIN/{1.73_M2}
GLUCOSE BLD-MCNC: 214 MG/DL (ref 70–99)
GLUCOSE BLD-MCNC: 257 MG/DL (ref 70–99)
GLUCOSE BLD-MCNC: 266 MG/DL (ref 70–99)
GLUCOSE BLD-MCNC: 290 MG/DL (ref 70–99)
GLUCOSE BLD-MCNC: 295 MG/DL (ref 70–99)
GLUCOSE BLD-MCNC: 380 MG/DL (ref 70–99)
GLUCOSE BLD-MCNC: 392 MG/DL (ref 70–99)
HCT VFR BLD CALC: 40.4 % (ref 36–48)
HCT VFR BLD CALC: 41.3 % (ref 36–48)
HEMOGLOBIN: 13 G/DL (ref 12–16)
HEMOGLOBIN: 13.2 G/DL (ref 12–16)
LACTIC ACID: 1.9 MMOL/L (ref 0.4–2)
LYMPHOCYTES ABSOLUTE: 3.5 K/UL (ref 1–5.1)
LYMPHOCYTES RELATIVE PERCENT: 42.9 %
MCH RBC QN AUTO: 25.7 PG (ref 26–34)
MCH RBC QN AUTO: 25.9 PG (ref 26–34)
MCHC RBC AUTO-ENTMCNC: 31.9 G/DL (ref 31–36)
MCHC RBC AUTO-ENTMCNC: 32.1 G/DL (ref 31–36)
MCV RBC AUTO: 79.9 FL (ref 80–100)
MCV RBC AUTO: 81 FL (ref 80–100)
MONOCYTES ABSOLUTE: 0.5 K/UL (ref 0–1.3)
MONOCYTES RELATIVE PERCENT: 6.4 %
NEUTROPHILS ABSOLUTE: 4 K/UL (ref 1.7–7.7)
NEUTROPHILS RELATIVE PERCENT: 49.7 %
PDW BLD-RTO: 15.8 % (ref 12.4–15.4)
PDW BLD-RTO: 16 % (ref 12.4–15.4)
PERFORMED ON: ABNORMAL
PLATELET # BLD: 376 K/UL (ref 135–450)
PLATELET # BLD: 449 K/UL (ref 135–450)
PMV BLD AUTO: 7.3 FL (ref 5–10.5)
PMV BLD AUTO: 7.4 FL (ref 5–10.5)
POTASSIUM REFLEX MAGNESIUM: 3.7 MMOL/L (ref 3.5–5.1)
POTASSIUM SERPL-SCNC: 3.9 MMOL/L (ref 3.5–5.1)
RBC # BLD: 5.06 M/UL (ref 4–5.2)
RBC # BLD: 5.1 M/UL (ref 4–5.2)
SODIUM BLD-SCNC: 134 MMOL/L (ref 136–145)
SODIUM BLD-SCNC: 136 MMOL/L (ref 136–145)
TOTAL PROTEIN: 7.1 G/DL (ref 6.4–8.2)
WBC # BLD: 8.1 K/UL (ref 4–11)
WBC # BLD: 8.2 K/UL (ref 4–11)

## 2023-01-25 PROCEDURE — 6370000000 HC RX 637 (ALT 250 FOR IP): Performed by: NURSE PRACTITIONER

## 2023-01-25 PROCEDURE — 6360000002 HC RX W HCPCS: Performed by: INTERNAL MEDICINE

## 2023-01-25 PROCEDURE — 2580000003 HC RX 258: Performed by: INTERNAL MEDICINE

## 2023-01-25 PROCEDURE — 6370000000 HC RX 637 (ALT 250 FOR IP): Performed by: INTERNAL MEDICINE

## 2023-01-25 PROCEDURE — 85025 COMPLETE CBC W/AUTO DIFF WBC: CPT

## 2023-01-25 PROCEDURE — 6370000000 HC RX 637 (ALT 250 FOR IP)

## 2023-01-25 PROCEDURE — 80053 COMPREHEN METABOLIC PANEL: CPT

## 2023-01-25 PROCEDURE — 85027 COMPLETE CBC AUTOMATED: CPT

## 2023-01-25 PROCEDURE — 2580000003 HC RX 258: Performed by: NURSE PRACTITIONER

## 2023-01-25 PROCEDURE — 83605 ASSAY OF LACTIC ACID: CPT

## 2023-01-25 PROCEDURE — 1200000000 HC SEMI PRIVATE

## 2023-01-25 PROCEDURE — 36415 COLL VENOUS BLD VENIPUNCTURE: CPT

## 2023-01-25 RX ORDER — SODIUM CHLORIDE 0.9 % (FLUSH) 0.9 %
5-40 SYRINGE (ML) INJECTION PRN
Status: DISCONTINUED | OUTPATIENT
Start: 2023-01-25 | End: 2023-01-26 | Stop reason: HOSPADM

## 2023-01-25 RX ORDER — SODIUM CHLORIDE 9 MG/ML
INJECTION, SOLUTION INTRAVENOUS PRN
Status: DISCONTINUED | OUTPATIENT
Start: 2023-01-25 | End: 2023-01-26 | Stop reason: HOSPADM

## 2023-01-25 RX ORDER — INSULIN LISPRO 100 [IU]/ML
6 INJECTION, SOLUTION INTRAVENOUS; SUBCUTANEOUS ONCE
Status: COMPLETED | OUTPATIENT
Start: 2023-01-25 | End: 2023-01-25

## 2023-01-25 RX ORDER — FAMOTIDINE 20 MG/1
20 TABLET, FILM COATED ORAL 2 TIMES DAILY
Status: DISCONTINUED | OUTPATIENT
Start: 2023-01-25 | End: 2023-01-26 | Stop reason: HOSPADM

## 2023-01-25 RX ORDER — DIPHENHYDRAMINE HYDROCHLORIDE 50 MG/ML
50 INJECTION INTRAMUSCULAR; INTRAVENOUS ONCE
Status: DISCONTINUED | OUTPATIENT
Start: 2023-01-25 | End: 2023-01-25

## 2023-01-25 RX ORDER — FAMOTIDINE 10 MG/ML
20 INJECTION, SOLUTION INTRAVENOUS ONCE
Status: DISCONTINUED | OUTPATIENT
Start: 2023-01-25 | End: 2023-01-25

## 2023-01-25 RX ORDER — SODIUM CHLORIDE 0.9 % (FLUSH) 0.9 %
5-40 SYRINGE (ML) INJECTION EVERY 12 HOURS SCHEDULED
Status: DISCONTINUED | OUTPATIENT
Start: 2023-01-25 | End: 2023-01-26 | Stop reason: HOSPADM

## 2023-01-25 RX ORDER — DIPHENHYDRAMINE HCL 25 MG
50 TABLET ORAL ONCE
Status: COMPLETED | OUTPATIENT
Start: 2023-01-26 | End: 2023-01-26

## 2023-01-25 RX ORDER — METHYLPREDNISOLONE SODIUM SUCCINATE 125 MG/2ML
125 INJECTION, POWDER, LYOPHILIZED, FOR SOLUTION INTRAMUSCULAR; INTRAVENOUS ONCE
Status: DISCONTINUED | OUTPATIENT
Start: 2023-01-25 | End: 2023-01-25

## 2023-01-25 RX ORDER — PREDNISONE 20 MG/1
40 TABLET ORAL 2 TIMES DAILY
Status: DISCONTINUED | OUTPATIENT
Start: 2023-01-25 | End: 2023-01-26 | Stop reason: HOSPADM

## 2023-01-25 RX ADMIN — Medication 10 ML: at 20:08

## 2023-01-25 RX ADMIN — ASPIRIN 81 MG: 81 TABLET, COATED ORAL at 09:16

## 2023-01-25 RX ADMIN — FAMOTIDINE 20 MG: 20 TABLET, FILM COATED ORAL at 14:47

## 2023-01-25 RX ADMIN — INSULIN LISPRO 4 UNITS: 100 INJECTION, SOLUTION INTRAVENOUS; SUBCUTANEOUS at 16:44

## 2023-01-25 RX ADMIN — FAMOTIDINE 20 MG: 20 TABLET, FILM COATED ORAL at 20:05

## 2023-01-25 RX ADMIN — PREDNISONE 40 MG: 20 TABLET ORAL at 20:06

## 2023-01-25 RX ADMIN — ACETAMINOPHEN 325MG 650 MG: 325 TABLET ORAL at 10:41

## 2023-01-25 RX ADMIN — INSULIN LISPRO 6 UNITS: 100 INJECTION, SOLUTION INTRAVENOUS; SUBCUTANEOUS at 22:08

## 2023-01-25 RX ADMIN — Medication 10 ML: at 09:16

## 2023-01-25 RX ADMIN — BENZONATATE 100 MG: 100 CAPSULE ORAL at 14:47

## 2023-01-25 RX ADMIN — PREDNISONE 40 MG: 20 TABLET ORAL at 14:47

## 2023-01-25 RX ADMIN — ENOXAPARIN SODIUM 30 MG: 100 INJECTION SUBCUTANEOUS at 20:05

## 2023-01-25 RX ADMIN — LOSARTAN POTASSIUM 25 MG: 25 TABLET, FILM COATED ORAL at 20:06

## 2023-01-25 RX ADMIN — BENZONATATE 100 MG: 100 CAPSULE ORAL at 20:06

## 2023-01-25 RX ADMIN — INSULIN GLARGINE 20 UNITS: 100 INJECTION, SOLUTION SUBCUTANEOUS at 20:52

## 2023-01-25 RX ADMIN — INSULIN LISPRO 4 UNITS: 100 INJECTION, SOLUTION INTRAVENOUS; SUBCUTANEOUS at 20:47

## 2023-01-25 RX ADMIN — SODIUM CHLORIDE, PRESERVATIVE FREE 10 ML: 5 INJECTION INTRAVENOUS at 20:09

## 2023-01-25 RX ADMIN — PANTOPRAZOLE SODIUM 40 MG: 40 TABLET, DELAYED RELEASE ORAL at 20:05

## 2023-01-25 RX ADMIN — BENZONATATE 100 MG: 100 CAPSULE ORAL at 09:16

## 2023-01-25 RX ADMIN — ATORVASTATIN CALCIUM 40 MG: 40 TABLET, FILM COATED ORAL at 20:05

## 2023-01-25 ASSESSMENT — PAIN DESCRIPTION - DESCRIPTORS: DESCRIPTORS: ACHING

## 2023-01-25 ASSESSMENT — PAIN SCALES - GENERAL
PAINLEVEL_OUTOF10: 0
PAINLEVEL_OUTOF10: 7

## 2023-01-25 ASSESSMENT — PAIN DESCRIPTION - ORIENTATION: ORIENTATION: MID

## 2023-01-25 ASSESSMENT — PAIN - FUNCTIONAL ASSESSMENT: PAIN_FUNCTIONAL_ASSESSMENT: ACTIVITIES ARE NOT PREVENTED

## 2023-01-25 ASSESSMENT — PAIN DESCRIPTION - LOCATION: LOCATION: HEAD

## 2023-01-25 NOTE — CARE COORDINATION
Spoke with RN who states patient will be getting cardiac cath today. Patient should be able to discharge tomorrow. Patient is normally independent at home and works full time. Please notify CM should any needs arise.

## 2023-01-25 NOTE — PROGRESS NOTES
Progress Note      PCP: Albert Nam DO    Date of Admission: 1/22/2023    Chief Complaint: chest pain    Hospital Course: Clemencia Brunner is a 53-year-old female with history of HTN, T2DM, hyperlipidemia, left lower abdominal hernia, history of TIAs, and class III obesity who presented to [facility] with chest pain on 1/22 which started around 3pm that day. She reported mid sternal chest pain while helping her  put together a television. She reports the pain radiated to her jaw and was associated with sweating and shortness of breath. She did take a 81 mg aspirin after pain onset and did have some relief. She reported she received an angiogram in [place] in 2021 revealing some vessel blockage but they did not place a stent \"because the doctor said my arteries were too narrow. \" She is unsure of which arteries. Negative troponin in the ED, EKG revealing  left axis deviation with T wave inversion in precordial leads concerning for possible ischemia. CT chest was negative for pulmonary embolism. She was admitted for further evaluation. Pharmacologic stress test 1/24 revealed abnormal myocardial perfusion with moderate reversible ischemia in the territory of the LAD. LVEF > 65%. Cardiology was consulted and is planning Left heart catheterization with possible PCI 1/25. Subjective: Patient seen today resting in bed comfortably. She was seen by cardiology before my visit. Cards is planning left heart cath with possible PCI this afternoon. Patient denies chest pain, SOA, N/V/D, vision changes. Endorses headache and reports she has VANNESA but left her CPAP machine in [facility] before moving. Glucose  of 290 this morning, patient on SSI.  Will continue to monitor    Medications:  Reviewed    Infusion Medications    sodium chloride      dextrose       Scheduled Medications    insulin lispro  0-4 Units SubCUTAneous Nightly    insulin glargine  20 Units SubCUTAneous Nightly    insulin lispro  0-8 Units SubCUTAneous TID WC    benzonatate  100 mg Oral TID    enoxaparin  30 mg SubCUTAneous BID    aspirin  81 mg Oral Daily    atorvastatin  40 mg Oral Daily    losartan  25 mg Oral Daily    pantoprazole  40 mg Oral QAM AC    sodium chloride flush  5-40 mL IntraVENous 2 times per day     PRN Meds: sodium chloride flush, sodium chloride, ondansetron **OR** ondansetron, acetaminophen **OR** acetaminophen, polyethylene glycol, glucose, dextrose bolus **OR** dextrose bolus, glucagon (rDNA), dextrose, perflutren lipid microspheres, nitroGLYCERIN, guaiFENesin-dextromethorphan      Intake/Output Summary (Last 24 hours) at 1/25/2023 0853  Last data filed at 1/25/2023 6439  Gross per 24 hour   Intake 240 ml   Output 0 ml   Net 240 ml       Physical Exam Performed:    BP (!) 148/89   Pulse 70   Temp 97.9 °F (36.6 °C) (Oral)   Resp 16   Ht 5' (1.524 m)   Wt 220 lb 14.4 oz (100.2 kg)   SpO2 95%   BMI 43.14 kg/m²     General appearance: No apparent distress, obese female who appears stated age and cooperative. HEENT: Conjunctivae/corneas clear. Neck: Supple, with full range of motion. No jugular venous distention. Trachea midline. Respiratory:  Normal respiratory effort. Clear to auscultation, bilaterally without Rales/Wheezes/Rhonchi. Cardiovascular: Regular rate and rhythm with without murmurs, rubs or gallops. Abdomen: Soft, distended, LLQ tenderness secondary to existing ventral hernia   Musculoskeletal: No clubbing, cyanosis or edema bilaterally. Skin: Skin color, texture, turgor normal.  No rashes or lesions. Neurologic:  Neurovascularly intact without any focal sensory/motor deficits.    Psychiatric: Alert and oriented, thought content appropriate, normal insight  Capillary Refill: Brisk,3 seconds, normal   Peripheral Pulses: +2 palpable, equal bilaterally       Labs:   Recent Labs     01/23/23  0613 01/24/23  0525 01/25/23  0616   WBC 11.1* 11.6* 8.1   HGB 13.2 12.7 13.0   HCT 40.5 39.1 40.4    410 449 Recent Labs     01/23/23  0613 01/24/23  0525 01/25/23  0616    135* 136   K 3.8 4.2 3.7    101 101   CO2 21 21 25   BUN 12 15 17   CREATININE 0.7 0.7 0.6   CALCIUM 9.3 9.2 9.2     Recent Labs     01/22/23  1726 01/24/23  0525 01/25/23  0616   AST 15 11* 10*   ALT 18 15 15   BILITOT 0.3 <0.2 0.3   ALKPHOS 130* 128 112     No results for input(s): INR in the last 72 hours. Recent Labs     01/22/23  1726 01/22/23  2102 01/22/23  2346   TROPONINI <0.01 <0.01 <0.01       Urinalysis:      Lab Results   Component Value Date/Time    NITRU Negative 01/22/2023 07:05 PM    BLOODU Negative 01/22/2023 07:05 PM    SPECGRAV <=1.005 01/22/2023 07:05 PM    GLUCOSEU >=1000 01/22/2023 07:05 PM       Radiology:  NM Cardiac Stress Test Nuclear Imaging   Final Result      CT SOFT TISSUE NECK W CONTRAST   Final Result   Mildly enlarged right level 2 lymph nodes, nonspecific and may be reactive in   the setting of infection. Otherwise no acute abnormality of the soft tissue   structures of the neck. US THYROID   Preliminary Result   NODULE Left mid: ACR TI-RADS TR3:      Recommend: Follow-up ultrasound in 1 year. ACR TI-RADS recommendations:      TR3 (3 points):  FNA if >= 2.5 cm; follow-up if 1.5-2.4 cm in 1, 3, and 5   years         CT CHEST PULMONARY EMBOLISM W CONTRAST   Final Result   No evidence of pulmonary embolism or acute pulmonary abnormality.                  Assessment/Plan:    Active Hospital Problems    Diagnosis     Chest pain [R07.9]      Priority: Medium    Hyperlipidemia [E78.5]      Priority: Medium    Primary hypertension [I10]      Priority: Medium    Class 3 severe obesity with serious comorbidity and body mass index (BMI) of 40.0 to 44.9 in adult ([facility] [address] [E66.01, Z68.41]      Priority: Medium    Type 2 diabetes mellitus without complication, with long-term current use of insulin (HCC) [E11.9, Z79.4]      Priority: Medium        Chest Pain in the setting of known CAD  Myocardial Perfusion Defect (Abnormal Stress test)   -cardiology consulted, planning left heart catheterization with possible PCI this afternoon  -Pharmacologic stress test 1/24 revealing abnormal perfusion consistent with LAD ischemia  Summary    Abnormal myocardial perfusion study. There is a moderate-sized, reversible mid-anterior to apical,    mid-anteroseptal, apical septal, apical inferior, and apical lateral defect,    consistent with ischemia in the territory of the LAD. There is also a basal to mid-inferior defect that corrects on the    attenuation correction images and is favored to be secondary to attenuation    artifact. Normal left ventricular systolic function with calculated LVEF >65%. Overall findings represent an intermediate-high risk scan.   -serial troponins negative  -EKG in the ED revealing T wave inversion in precordial leads  -CT chest negative for PE or acute pulmonary abnormality  -continue telemetry     T2DM, poorly controlled  -A1C 10.1 on 1/19  -blood glucose 359 on admit  -POC glucose checks 4 times daily  -MDSSI  -start lantus 20 units nightly  -hold home metformin  -hypoglycemia protocol  -carb restricted diet     Essential Hypertension  -well controlled  -continue losartan 25 mg     Hyperlipidemia  -controlled  -continue atorvastatin 40 mg daily    Class III Obesity -  With Body mass index is 42.56 kg/m². Complicating assessment and treatment. Placing patient at risk for multiple co-morbidities as well as early death and contributing to the patient's presentation. Counseled on weight loss. DVT Prophylaxis: lovenox  Diet: ADULT DIET;  Regular; 4 carb choices (60 gm/meal)  Code Status: Full Code     PT/OT Eval Status: not ordered     Dispo - pending clinical improvement     Albert Nam DO, PGY-1   [facility]     Addendum to Resident  Progress note:  Pt seen,examined and evaluated with resident and discussed regarding POC . I have reviewed the current history, physical findings, labs and assessment and plan , edited the note where appropriate and agree with note as documented by resident DO ( Dr. Kassandra Jones)    Patient seen and examined with her  Eden Wood at bedside. Denies any chest pain/shortness of breath or any new symptoms. Evaluated by cardiology with initial plans for [facility] later today but unfortunately cardiology had an emergency case and had to postpone this case for tomorrow. Allow carb controlled diet for today and n.p.o. after midnight. Continue rest of the current care.   We will follow    Earl Holloway MD  Hospitalist Physician

## 2023-01-25 NOTE — CONSULTS
[address] [facility]  [phone]      Attending Physician: Itzel Batista MD  Reason for Consultation/Chief Complaint: Chest pain    Subjective   History of Present Illness:  Clemencia Brunner is a 50 y.o. patient who presented to the hospital with complaints of chest pain, patient presented to the hospital on 2022, she noted chest pain that radiated to her jaw, began on the day of presentation. She also noted associated shortness of breath. She says she recently had COVID has not been feeling well since then. She was evaluated and admitted and found to have negative troponin levels and as such had stress testing which was abnormal which raise concern for anterior ischemia. She is had prior care in [place], says she had abnormal testing and was planned to have a heart catheterization but did not have it due to small vessel size. Chronically, she does have diabetes, hypertension, hypercholesterolemia. She says these conditions are partially controlled on prescribed medical therapy. Past Medical History:   has a past medical history of Diabetes mellitus ([address] Headache, Hyperlipidemia, and TIA (transient ischemic attack). Surgical History:   has a past surgical history that includes Cholecystectomy;  section; Leg Surgery (Left, 10/28/2022); and Tubal ligation. Social History:   reports that she has never smoked. She has never used smokeless tobacco. She reports that she does not drink alcohol and does not use drugs. Family History:  family history includes Breast Cancer in her paternal grandmother; Diabetes in her brother, brother, and mother; Heart Disease in her paternal grandfather. Home Medications:  Were reviewed and are listed in nursing record and/or below  Prior to Admission medications    Medication Sig Start Date End Date Taking?  Authorizing Provider   metFORMIN (GLUCOPHAGE-XR) 500 MG extended release tablet Take 2 tablets by mouth daily (with breakfast) 1/21/23   María Roy DO   losartan (COZAAR) 25 MG tablet Take 1 tablet by mouth daily 1/19/23   María Roy DO   ASPIRIN LOW DOSE 81 MG EC tablet TAKE 1 TABLET BY MOUTH EVERY DAY 12/29/22   María Roy DO   diclofenac (VOLTAREN) 75 MG EC tablet TAKE 1 TABLET BY MOUTH TWICE A DAY 12/29/22   María Roy DO   omeprazole (PRILOSEC) 40 MG delayed release capsule Take 1 capsule by mouth every morning (before breakfast) 12/13/22   Julissa Muñoz DO   atorvastatin (LIPITOR) 40 MG tablet Take 1 tablet by mouth daily 11/17/22   María Roy DO   cyclobenzaprine (FLEXERIL) 10 MG tablet Take 10 mg by mouth 3 times daily as needed  Patient not taking: Reported on 1/19/2023 3/4/21   Historical Provider, MD   blood glucose test strips (ASCENSIA AUTODISC VI;ONE TOUCH ULTRA TEST VI) strip TEST BLOOD SUGARS ONCE A DAY EVERY MORNING 11/17/22   María Roy DO   empagliflozin (JARDIANCE) 25 MG tablet Take 1 tablet by mouth daily 11/17/22 1/19/23  María Roy DO        CURRENT Medications:  insulin lispro (HUMALOG) injection vial 0-4 Units, Nightly  insulin glargine (LANTUS) injection vial 20 Units, Nightly  insulin lispro (HUMALOG) injection vial 0-8 Units, TID WC  benzonatate (TESSALON) capsule 100 mg, TID  enoxaparin Sodium (LOVENOX) injection 30 mg, BID  aspirin EC tablet 81 mg, Daily  atorvastatin (LIPITOR) tablet 40 mg, Daily  losartan (COZAAR) tablet 25 mg, Daily  pantoprazole (PROTONIX) tablet 40 mg, QAM AC  sodium chloride flush 0.9 % injection 5-40 mL, 2 times per day  sodium chloride flush 0.9 % injection 5-40 mL, PRN  0.9 % sodium chloride infusion, PRN  ondansetron (ZOFRAN-ODT) disintegrating tablet 4 mg, Q8H PRN   Or  ondansetron (ZOFRAN) injection 4 mg, Q6H PRN  acetaminophen (TYLENOL) tablet 650 mg, Q6H PRN   Or  acetaminophen (TYLENOL) suppository 650 mg, Q6H PRN  polyethylene glycol (GLYCOLAX) packet 17 g, Daily PRN  glucose chewable tablet 16 g, PRN  dextrose bolus 10% 125 mL, PRN   Or  dextrose bolus 10% 250 mL, PRN  glucagon (rDNA) injection 1 mg, PRN  dextrose 10 % infusion, Continuous PRN  perflutren lipid microspheres (DEFINITY) injection 1.5 mL, ONCE PRN  nitroGLYCERIN (NITROSTAT) SL tablet 0.4 mg, Q5 Min PRN  guaiFENesin-dextromethorphan (ROBITUSSIN DM) 100-10 MG/5ML syrup 5 mL, Q4H PRN        Allergies: Iv contrast [iodides] and Pineapple     Review of Systems:   A 14 point review of symptoms completed. Pertinent positives identified in the HPI, all other review of symptoms negative as below.       Objective   PHYSICAL EXAM:    Vitals:    01/25/23 0805   BP: (!) 148/89   Pulse: 70   Resp: 16   Temp: 97.9 °F (36.6 °C)   SpO2: 95%    Weight: 220 lb 14.4 oz (100.2 kg)         General Appearance:  Alert, cooperative, no distress, appears stated age   Head:  Normocephalic, without obvious abnormality, atraumatic   Eyes:  PERRL, conjunctiva/corneas clear   Nose: Nares normal, no drainage or sinus tenderness   Throat: Lips, mucosa, and tongue normal   Neck: Supple, symmetrical, trachea midline, no adenopathy, thyroid: not enlarged, symmetric, no tenderness/mass/nodules, no carotid bruit or JVD   Lungs:   Clear to auscultation bilaterally, respirations unlabored   Chest Wall:  No deformity or tenderness   Heart:  Regular rate and rhythm, S1, S2 normal, no murmur, rub or gallop   Abdomen:   Soft, non-tender, bowel sounds active all four quadrants,  no masses, no organomegaly   Extremities: Extremities normal, atraumatic, no cyanosis or edema   Pulses: 2+ and symmetric   Skin: Skin color, texture, turgor normal, no rashes or lesions   Pysch: Normal mood and affect   Neurologic: Normal gross motor and sensory exam.         Labs   CBC:   Lab Results   Component Value Date/Time    WBC 8.1 01/25/2023 06:16 AM    RBC 5.06 01/25/2023 06:16 AM    HGB 13.0 01/25/2023 06:16 AM    HCT 40.4 01/25/2023 06:16 AM    MCV 79.9 01/25/2023 06:16 AM    RDW 15.8 01/25/2023 06:16 AM     01/25/2023 06:16 AM     CMP:  Lab Results   Component Value Date/Time     01/25/2023 06:16 AM    K 3.7 01/25/2023 06:16 AM     01/25/2023 06:16 AM    CO2 25 01/25/2023 06:16 AM    BUN 17 01/25/2023 06:16 AM    CREATININE 0.6 01/25/2023 06:16 AM    GFRAA >60 10/13/2022 11:28 PM    AGRATIO 1.5 01/25/2023 06:16 AM    LABGLOM >60 01/25/2023 06:16 AM    GLUCOSE 290 01/25/2023 06:16 AM    PROT 7.1 01/25/2023 06:16 AM    CALCIUM 9.2 01/25/2023 06:16 AM    BILITOT 0.3 01/25/2023 06:16 AM    ALKPHOS 112 01/25/2023 06:16 AM    AST 10 01/25/2023 06:16 AM    ALT 15 01/25/2023 06:16 AM     PT/INR:  No results found for: PTINR  HgBA1c:  Lab Results   Component Value Date    LABA1C 10.1 01/19/2023     Lab Results   Component Value Date    TROPONINI <0.01 01/22/2023         Cardiac Data     Last EKG:    Normal sinus rhythm, left axis, poor R wave progression, precordial T wave inversions    Echo:     Summary   Normal left ventricle systolic function with an estimated ejection fraction   of 55%. The study is limited and regional wall motion abnormalities cannot be   evaluated or excluded. Normal left ventricular diastolic filling pressure. No significant valvular heart disease noted. Stress Test:    Summary    Abnormal myocardial perfusion study. There is a moderate-sized, reversible mid-anterior to apical,    mid-anteroseptal, apical septal, apical inferior, and apical lateral defect,    consistent with ischemia in the territory of the LAD. There is also a basal to mid-inferior defect that corrects on the    attenuation correction images and is favored to be secondary to attenuation    artifact. Normal left ventricular systolic function with calculated LVEF >65%. Overall findings represent an intermediate-high risk scan. Cath:    Studies:     Ct chest       Impression   No evidence of pulmonary embolism or acute pulmonary abnormality.            I have reviewed labs and imaging/xray/diagnostic testing in this note. Assessment and Plan          Patient Active Problem List   Diagnosis    TIA (transient ischemic attack)    Class 3 severe obesity with serious comorbidity and body mass index (BMI) of 40.0 to 44.9 in adult [facility])    Type 2 diabetes mellitus without complication, with long-term current use of insulin (HCC)    Abscess of left thigh    Sepsis (HCC)    Tachycardia    Tachypnea    Neutrophilic leukocytosis    Hidradenitis suppurativa    Groin abscess    Teratoma of ovary, right    Ventral hernia without obstruction or gangrene    Hyperlipidemia    History of TIA (transient ischemic attack)    Internal carotid artery stenosis, bilateral    Primary hypertension    Chest pain       Chest pain, abnormal stress test, plan on heart catheterization, risk/benefit/alternatives/outcomes discussed with patient, she understands/agrees and wishes to proceed with this today. Continue aspirin. Hypercholesterolemia, continue statin    Hypertension, continue losartan    Diabetes, as per primary service    DISCUSSION OF CARDIAC CATHETERIZATION PROCEDURES: The procedures, indications, risks and alternatives have been discussed with the patient and, as appropriate, with the patient's guardian . Risks discussed included, but are not limited to, bleeding, development of blood clots/emboli, damage to blood vessels, renal failure, malignant cardiac arrhythmias, stroke, heart attack, emergent coronary bypass surgery, death, dye allergy. The patient (and guardian as appropriate) expressed understanding of the aforementioned and wished to proceed. Addendum:  pt w/ contrast allergy, will order pretreatment meds and schedule cath for tomorrow      Thank you for allowing us to participate in the care of Tiago Ballard. Please call me with any questions [phone].     Ni Beverly MD, [facility]   Interventional Cardiologist  [facility]  [phone] [facility]  [phone] [place] Office  1/25/2023 8:20 AM

## 2023-01-25 NOTE — DISCHARGE INSTRUCTIONS
You do not need to see a cardiologist at this time. If you would like to see Dr. Jamie Thomson, or one of the other providers from [facility] please call our office. Office location [address] [place] [address] [address], [facility] [address]. [facility] Office #: [phone]. Directions to [place] [address] towards [place]. [address] [facility] exit. Right off exit. Cross over [employer]. Right on [address] Left into hospital. Follow the signs to the emergency room ( turn left toward the Emergency room). Go right at the first stop sign. Just past the Emergency room at the second stop sign turn right and go up the ramp and park on the top level if possible. Go in the glass doors of the MOB we on the top level of the garage [address]. As soon as you get in the door turn left and our office is the one with the glass doors.

## 2023-01-25 NOTE — PLAN OF CARE
Problem: Discharge Planning  Goal: Discharge to home or other facility with appropriate resources  Outcome: Progressing  Flowsheets (Taken 1/24/2023 2056 by Guille Yoon RN)  Discharge to home or other facility with appropriate resources:   Identify barriers to discharge with patient and caregiver   Arrange for needed discharge resources and transportation as appropriate   Identify discharge learning needs (meds, wound care, etc)     Problem: Chronic Conditions and Co-morbidities  Goal: Patient's chronic conditions and co-morbidity symptoms are monitored and maintained or improved  Outcome: Progressing  Flowsheets (Taken 1/24/2023 2056 by Guille Yoon RN)  Care Plan - Patient's Chronic Conditions and Co-Morbidity Symptoms are Monitored and Maintained or Improved:   Monitor and assess patient's chronic conditions and comorbid symptoms for stability, deterioration, or improvement   Collaborate with multidisciplinary team to address chronic and comorbid conditions and prevent exacerbation or deterioration

## 2023-01-26 ENCOUNTER — APPOINTMENT (OUTPATIENT)
Dept: CARDIAC CATH/INVASIVE PROCEDURES | Age: 53
DRG: 191 | End: 2023-01-26
Payer: MEDICAID

## 2023-01-26 VITALS
BODY MASS INDEX: 42.78 KG/M2 | DIASTOLIC BLOOD PRESSURE: 75 MMHG | OXYGEN SATURATION: 96 % | TEMPERATURE: 97.7 F | SYSTOLIC BLOOD PRESSURE: 117 MMHG | WEIGHT: 217.9 LBS | HEIGHT: 60 IN | HEART RATE: 71 BPM | RESPIRATION RATE: 18 BRPM

## 2023-01-26 LAB
A/G RATIO: 1.5 (ref 1.1–2.2)
ALBUMIN SERPL-MCNC: 4.5 G/DL (ref 3.4–5)
ALP BLD-CCNC: 122 U/L (ref 40–129)
ALT SERPL-CCNC: 17 U/L (ref 10–40)
ANION GAP SERPL CALCULATED.3IONS-SCNC: 10 MMOL/L (ref 3–16)
AST SERPL-CCNC: 11 U/L (ref 15–37)
BASOPHILS ABSOLUTE: 0 K/UL (ref 0–0.2)
BASOPHILS RELATIVE PERCENT: 0.3 %
BILIRUB SERPL-MCNC: 0.4 MG/DL (ref 0–1)
BUN BLDV-MCNC: 14 MG/DL (ref 7–20)
CALCIUM SERPL-MCNC: 10 MG/DL (ref 8.3–10.6)
CHLORIDE BLD-SCNC: 100 MMOL/L (ref 99–110)
CO2: 26 MMOL/L (ref 21–32)
CREAT SERPL-MCNC: 0.6 MG/DL (ref 0.6–1.1)
EKG ATRIAL RATE: 71 BPM
EKG DIAGNOSIS: NORMAL
EKG P AXIS: 69 DEGREES
EKG P-R INTERVAL: 128 MS
EKG Q-T INTERVAL: 426 MS
EKG QRS DURATION: 86 MS
EKG QTC CALCULATION (BAZETT): 462 MS
EKG R AXIS: -45 DEGREES
EKG T AXIS: 27 DEGREES
EKG VENTRICULAR RATE: 71 BPM
EOSINOPHILS ABSOLUTE: 0 K/UL (ref 0–0.6)
EOSINOPHILS RELATIVE PERCENT: 0 %
GFR SERPL CREATININE-BSD FRML MDRD: >60 ML/MIN/{1.73_M2}
GLUCOSE BLD-MCNC: 259 MG/DL (ref 70–99)
GLUCOSE BLD-MCNC: 295 MG/DL (ref 70–99)
GLUCOSE BLD-MCNC: 321 MG/DL (ref 70–99)
HCT VFR BLD CALC: 43.8 % (ref 36–48)
HEMOGLOBIN: 14.6 G/DL (ref 12–16)
LV EF: 60 %
LVEF MODALITY: NORMAL
LYMPHOCYTES ABSOLUTE: 1.7 K/UL (ref 1–5.1)
LYMPHOCYTES RELATIVE PERCENT: 15.9 %
MCH RBC QN AUTO: 26.5 PG (ref 26–34)
MCHC RBC AUTO-ENTMCNC: 33.3 G/DL (ref 31–36)
MCV RBC AUTO: 79.7 FL (ref 80–100)
MONOCYTES ABSOLUTE: 0.5 K/UL (ref 0–1.3)
MONOCYTES RELATIVE PERCENT: 4.9 %
NEUTROPHILS ABSOLUTE: 8.3 K/UL (ref 1.7–7.7)
NEUTROPHILS RELATIVE PERCENT: 78.9 %
PDW BLD-RTO: 15.5 % (ref 12.4–15.4)
PERFORMED ON: ABNORMAL
PERFORMED ON: ABNORMAL
PLATELET # BLD: 452 K/UL (ref 135–450)
PMV BLD AUTO: 7.4 FL (ref 5–10.5)
POTASSIUM REFLEX MAGNESIUM: 4.3 MMOL/L (ref 3.5–5.1)
RBC # BLD: 5.49 M/UL (ref 4–5.2)
SODIUM BLD-SCNC: 136 MMOL/L (ref 136–145)
TOTAL PROTEIN: 7.5 G/DL (ref 6.4–8.2)
WBC # BLD: 10.6 K/UL (ref 4–11)

## 2023-01-26 PROCEDURE — 36415 COLL VENOUS BLD VENIPUNCTURE: CPT

## 2023-01-26 PROCEDURE — 93458 L HRT ARTERY/VENTRICLE ANGIO: CPT

## 2023-01-26 PROCEDURE — 93010 ELECTROCARDIOGRAM REPORT: CPT | Performed by: INTERNAL MEDICINE

## 2023-01-26 PROCEDURE — 85025 COMPLETE CBC W/AUTO DIFF WBC: CPT

## 2023-01-26 PROCEDURE — 2580000003 HC RX 258: Performed by: INTERNAL MEDICINE

## 2023-01-26 PROCEDURE — 93005 ELECTROCARDIOGRAM TRACING: CPT | Performed by: INTERNAL MEDICINE

## 2023-01-26 PROCEDURE — C1894 INTRO/SHEATH, NON-LASER: HCPCS

## 2023-01-26 PROCEDURE — 80053 COMPREHEN METABOLIC PANEL: CPT

## 2023-01-26 PROCEDURE — 2500000003 HC RX 250 WO HCPCS

## 2023-01-26 PROCEDURE — 2709999900 HC NON-CHARGEABLE SUPPLY

## 2023-01-26 PROCEDURE — 6370000000 HC RX 637 (ALT 250 FOR IP): Performed by: INTERNAL MEDICINE

## 2023-01-26 PROCEDURE — 6370000000 HC RX 637 (ALT 250 FOR IP): Performed by: NURSE PRACTITIONER

## 2023-01-26 PROCEDURE — C1769 GUIDE WIRE: HCPCS

## 2023-01-26 PROCEDURE — B2151ZZ FLUOROSCOPY OF LEFT HEART USING LOW OSMOLAR CONTRAST: ICD-10-PCS | Performed by: INTERNAL MEDICINE

## 2023-01-26 PROCEDURE — 6360000002 HC RX W HCPCS

## 2023-01-26 PROCEDURE — 6370000000 HC RX 637 (ALT 250 FOR IP)

## 2023-01-26 PROCEDURE — B2111ZZ FLUOROSCOPY OF MULTIPLE CORONARY ARTERIES USING LOW OSMOLAR CONTRAST: ICD-10-PCS | Performed by: INTERNAL MEDICINE

## 2023-01-26 PROCEDURE — 2580000003 HC RX 258

## 2023-01-26 PROCEDURE — 4A023N7 MEASUREMENT OF CARDIAC SAMPLING AND PRESSURE, LEFT HEART, PERCUTANEOUS APPROACH: ICD-10-PCS | Performed by: INTERNAL MEDICINE

## 2023-01-26 RX ORDER — SODIUM CHLORIDE 0.9 % (FLUSH) 0.9 %
5-40 SYRINGE (ML) INJECTION EVERY 12 HOURS SCHEDULED
Status: DISCONTINUED | OUTPATIENT
Start: 2023-01-26 | End: 2023-01-26 | Stop reason: HOSPADM

## 2023-01-26 RX ORDER — SODIUM CHLORIDE 0.9 % (FLUSH) 0.9 %
5-40 SYRINGE (ML) INJECTION PRN
Status: DISCONTINUED | OUTPATIENT
Start: 2023-01-26 | End: 2023-01-26 | Stop reason: HOSPADM

## 2023-01-26 RX ORDER — SODIUM CHLORIDE 9 MG/ML
INJECTION, SOLUTION INTRAVENOUS PRN
Status: DISCONTINUED | OUTPATIENT
Start: 2023-01-26 | End: 2023-01-26 | Stop reason: HOSPADM

## 2023-01-26 RX ORDER — PREDNISONE 20 MG/1
40 TABLET ORAL 2 TIMES DAILY
Qty: 2 TABLET | Refills: 0 | Status: SHIPPED | OUTPATIENT
Start: 2023-01-26 | End: 2023-01-27

## 2023-01-26 RX ORDER — INSULIN GLARGINE 100 [IU]/ML
20 INJECTION, SOLUTION SUBCUTANEOUS NIGHTLY
Qty: 5 ADJUSTABLE DOSE PRE-FILLED PEN SYRINGE | Refills: 0 | Status: SHIPPED | OUTPATIENT
Start: 2023-01-26

## 2023-01-26 RX ORDER — METHYLPREDNISOLONE SODIUM SUCCINATE 125 MG/2ML
INJECTION, POWDER, LYOPHILIZED, FOR SOLUTION INTRAMUSCULAR; INTRAVENOUS
Status: COMPLETED | OUTPATIENT
Start: 2023-01-26 | End: 2023-01-26

## 2023-01-26 RX ORDER — FENTANYL CITRATE 50 UG/ML
INJECTION, SOLUTION INTRAMUSCULAR; INTRAVENOUS
Status: COMPLETED | OUTPATIENT
Start: 2023-01-26 | End: 2023-01-26

## 2023-01-26 RX ORDER — ACETAMINOPHEN 325 MG/1
650 TABLET ORAL EVERY 4 HOURS PRN
Status: DISCONTINUED | OUTPATIENT
Start: 2023-01-26 | End: 2023-01-26 | Stop reason: HOSPADM

## 2023-01-26 RX ORDER — BENZONATATE 100 MG/1
100 CAPSULE ORAL 3 TIMES DAILY
Qty: 21 CAPSULE | Refills: 0 | Status: SHIPPED | OUTPATIENT
Start: 2023-01-26 | End: 2023-02-02

## 2023-01-26 RX ORDER — MIDAZOLAM HYDROCHLORIDE 1 MG/ML
INJECTION INTRAMUSCULAR; INTRAVENOUS
Status: COMPLETED | OUTPATIENT
Start: 2023-01-26 | End: 2023-01-26

## 2023-01-26 RX ADMIN — MIDAZOLAM HYDROCHLORIDE 2 MG: 1 INJECTION INTRAMUSCULAR; INTRAVENOUS at 08:30

## 2023-01-26 RX ADMIN — Medication 10 ML: at 10:47

## 2023-01-26 RX ADMIN — INSULIN LISPRO 6 UNITS: 100 INJECTION, SOLUTION INTRAVENOUS; SUBCUTANEOUS at 12:13

## 2023-01-26 RX ADMIN — FENTANYL CITRATE 50 MCG: 50 INJECTION, SOLUTION INTRAMUSCULAR; INTRAVENOUS at 08:30

## 2023-01-26 RX ADMIN — METOPROLOL TARTRATE 25 MG: 25 TABLET, FILM COATED ORAL at 10:47

## 2023-01-26 RX ADMIN — FAMOTIDINE 20 MG: 20 TABLET, FILM COATED ORAL at 07:24

## 2023-01-26 RX ADMIN — DIPHENHYDRAMINE HCL 50 MG: 25 TABLET ORAL at 07:22

## 2023-01-26 RX ADMIN — METHYLPREDNISOLONE SODIUM SUCCINATE 60 MG: 125 INJECTION, POWDER, LYOPHILIZED, FOR SOLUTION INTRAMUSCULAR; INTRAVENOUS at 08:35

## 2023-01-26 RX ADMIN — ASPIRIN 81 MG: 81 TABLET, COATED ORAL at 07:22

## 2023-01-26 RX ADMIN — PREDNISONE 40 MG: 20 TABLET ORAL at 07:24

## 2023-01-26 RX ADMIN — BENZONATATE 100 MG: 100 CAPSULE ORAL at 10:49

## 2023-01-26 ASSESSMENT — PAIN SCALES - GENERAL
PAINLEVEL_OUTOF10: 0

## 2023-01-26 NOTE — PROGRESS NOTES
Brief Pre-Op Note/Sedation Assessment      Morris Dunham  1970  [id number]  8:27 AM    Planned Procedure: Cardiac Catheterization Procedure  Post Procedure Plan: Return to same level of care  Consent: I have discussed with the patient and/or the patient representative the indication, alternatives, and the possible risks and/or complications of the planned procedure and the anesthesia methods. The patient and/or patient representative appear to understand and agree to proceed. DISCUSSION OF CARDIAC CATHETERIZATION PROCEDURES: The procedures, indications, risks and alternatives have been discussed with the patient and, as appropriate, with the patient's guardian . Risks discussed included, but are not limited to, bleeding, development of blood clots/emboli, damage to blood vessels, renal failure, malignant cardiac arrhythmias, stroke, heart attack, emergent coronary bypass surgery, death, dye allergy. The patient (and guardian as appropriate) expressed understanding of the aforementioned and wished to proceed. Chief Complaint:   Chest Pain/Pressure      Indications for Cath Procedure:  Presentation:  Worsening Angina  2. Anginal Classification within 2 weeks:  CCS IV - Inability to perform any activity without angina or angina at rest, i.e., severe limitation  3. Angina Symptoms Assessment:  Typical Chest Pain  4. Heart Failure Class within last 2 weeks:  No symptoms  5. Cardiovascular Instability:  No    Prior Ischemic Workup/Eval:  Pre-Procedural Medications: Yes: Aspirin and STATIN  2. Stress Test Completed? Yes:  Stress or Imaging Studies Performed (within ANY time period):   Type:  Stress Nuclear  Results:  Positive:  Myocardial Perfusion Defects (Nuclear) Extent of Ischemia:  High Risk (>3% annual death or MI)    Does Patient need surgery?   Cath Valve Surgery:  No    Pre-Procedure Medical History:  Vital Signs:  /80   Pulse 90   Temp 98.3 °F (36.8 °C) (Oral)   Resp 16   Ht 5' (1.524 m)   Wt 217 lb 14.4 oz (98.8 kg)   SpO2 97%   BMI 42.56 kg/m²     Allergies:     Allergies   Allergen Reactions    Iv Contrast [Iodides] Swelling     Tolerates Isovue with pretreatment of Solu-Medrol and Benadryl    Pineapple      Medications:    Current Facility-Administered Medications   Medication Dose Route Frequency Provider Last Rate Last Admin    sodium chloride flush 0.9 % injection 5-40 mL  5-40 mL IntraVENous 2 times per day Sanjuan Fleischer, MD   10 mL at 01/25/23 2009    sodium chloride flush 0.9 % injection 5-40 mL  5-40 mL IntraVENous PRN Sanjuan Fleischer, MD        0.9 % sodium chloride infusion   IntraVENous PRN Sanjuan Fleischer, MD        predniSONE (DELTASONE) tablet 40 mg  40 mg Oral BID Sanjuan Fleischer, MD   40 mg at 01/26/23 0724    famotidine (PEPCID) tablet 20 mg  20 mg Oral BID Sanjuan Fleischer, MD   20 mg at 01/26/23 0724    insulin lispro (HUMALOG) injection vial 0-4 Units  0-4 Units SubCUTAneous Nightly María Laurenian, DO   4 Units at 01/25/23 2047    insulin glargine (LANTUS) injection vial 20 Units  20 Units SubCUTAneous Nightly Carmen Zuniga MD   20 Units at 01/25/23 2052    insulin lispro (HUMALOG) injection vial 0-8 Units  0-8 Units SubCUTAneous TID  María Roy, DO   4 Units at 01/25/23 1644    benzonatate (TESSALON) capsule 100 mg  100 mg Oral TID Hung Bowman MD   100 mg at 01/25/23 2006    enoxaparin Sodium (LOVENOX) injection 30 mg  30 mg SubCUTAneous BID Hung Bowman MD   30 mg at 01/25/23 2005    aspirin EC tablet 81 mg  81 mg Oral Daily Willie November, APRN - CNP   81 mg at 01/26/23 3970    atorvastatin (LIPITOR) tablet 40 mg  40 mg Oral Daily Willie November, APRN - CNP   40 mg at 01/25/23 2005    losartan (COZAAR) tablet 25 mg  25 mg Oral Daily Willie November, APRN - CNP   25 mg at 01/25/23 2006    pantoprazole (PROTONIX) tablet 40 mg  40 mg Oral QAM  Omar November, APRN - CNP   40 mg at 01/25/23 2005    sodium chloride flush 0.9 % injection 5-40 mL  5-40 mL IntraVENous 2 times per day [place] Camp, APRN - CNP   10 mL at 23    sodium chloride flush 0.9 % injection 5-40 mL  5-40 mL IntraVENous PRN [place] Camp, APRN - CNP        0.9 % sodium chloride infusion   IntraVENous PRN Rita Camp, APRN - CNP        ondansetron (ZOFRAN-ODT) disintegrating tablet 4 mg  4 mg Oral Q8H PRN [place] Camp, APRN - CNP        Or    ondansetron (ZOFRAN) injection 4 mg  4 mg IntraVENous Q6H PRN Rita Camp, APRN - CNP        acetaminophen (TYLENOL) tablet 650 mg  650 mg Oral Q6H PRN [place] Camp, APRN - CNP   650 mg at 23 1041    Or    acetaminophen (TYLENOL) suppository 650 mg  650 mg Rectal Q6H PRN [place] Camp, APRN - CNP        polyethylene glycol (GLYCOLAX) packet 17 g  17 g Oral Daily PRN [place] Camp, APRN - CNP        glucose chewable tablet 16 g  4 tablet Oral PRN [place] Camp, APRN - CNP        dextrose bolus 10% 125 mL  125 mL IntraVENous PRN [place] Camp, APRN - CNP        Or    dextrose bolus 10% 250 mL  250 mL IntraVENous PRN Rita Camp, APRN - CNP        glucagon (rDNA) injection 1 mg  1 mg SubCUTAneous PRN Rita Camp, APRN - CNP        dextrose 10 % infusion   IntraVENous Continuous PRN [place] Camp, APRN - CNP        perflutren lipid microspheres (DEFINITY) injection 1.5 mL  1.5 mL IntraVENous ONCE PRN [place] Camp, APRN - CNP        nitroGLYCERIN (NITROSTAT) SL tablet 0.4 mg  0.4 mg SubLINGual Q5 Min PRN [place] Camp, APRN - CNP        guaiFENesin-dextromethorphan (ROBITUSSIN DM) 100-10 MG/5ML syrup 5 mL  5 mL Oral Q4H PRN [place] Camp, APRN - CNP   5 mL at 23 0028       Past Medical History:    Past Medical History:   Diagnosis Date    Diabetes mellitus ([facility] [address]     Headache     Hyperlipidemia     TIA (transient ischemic attack)     , ,  per pt       Surgical History:    Past Surgical History:   Procedure Laterality Date     SECTION      CHOLECYSTECTOMY      LEG SURGERY Left 10/28/2022    LEFT VANI  DEBRIDEMENT INCISION AND DRAINAGE [facility]  Pre-Sedation Documentation and Exam:  I have personally completed a history, physical exam & review of systems for this patient (see notes). Prior History of Anesthesia Complications:   none    Modified Mallampati:  III (soft palate, base of uvula visible)    ASA Classification:  Class 4 - A patient with an incapacitating systemic disease that is a constant threat to life    Samy Scale: Activity:  2 - Able to move 4 extremities voluntarily on command  Respiration:  2 - Able to breathe deeply and cough freely  Circulation:  2 - BP+/- 20mmHg of normal  Consciousness:  2 - Fully awake  Oxygen Saturation (color):  2 - Able to maintain oxygen saturation >92% on room air    Sedation/Anesthesia Plan:  Guard the patient's safety and welfare. Minimize physical discomfort and pain. Minimize negative psychological responses to treatment by providing sedation and analgesia and maximize the potential amnesia. Patient to meet pre-procedure discharge plan.     Medication Planned:  midazolam intravenously and fentanyl intravenously    Patient is an appropriate candidate for plan of sedation:   yes      Electronically signed by Ryan Peñaloza MD on 1/26/2023 at 8:27 AM

## 2023-01-26 NOTE — PROGRESS NOTES
Patient ready for discharge, reviewed AVS with patient and answered all questions including when to take next doses of medications. Removed peripheral IV and gauze applie, tele box removed and CMU notified. Patient taken to outpatient pharmacy to  medications and then taken to [facility] to take her home.

## 2023-01-26 NOTE — DISCHARGE SUMMARY
Discharge Summary    Patient ID: Raffaele Sahu      Patient's PCP: Rob Bone DO    Admit Date: 1/22/2023     Discharge Date:   1/26/2023    Admitting Physician: Armando Oliva MD     Discharge Physician: Armando Oliva MD    Discharge Diagnoses: Active Hospital Problems    Diagnosis     Chest pain [R07.9]      Priority: Medium    Hyperlipidemia [E78.5]      Priority: Medium    Primary hypertension [I10]      Priority: Medium    Class 3 severe obesity with serious comorbidity and body mass index (BMI) of 40.0 to 44.9 in adult ([facility] [address] [E66.01, Z68.41]      Priority: Medium    Type 2 diabetes mellitus without complication, with long-term current use of insulin (HCC) [E11.9, Z79.4]      Priority: Medium       The patient was seen and examined on day of discharge and this discharge summary is in conjunction with any daily progress note from day of discharge. Hospital Course: Raffaele Sahu is a 28-year-old female with history of HTN, T2DM, hyperlipidemia, left lower abdominal hernia, history of TIAs, and class III obesity who presented to [facility] with chest pain on 1/22 which started around 3pm that day. She reported mid sternal chest pain while helping her  put together a television. She reports the pain radiated to her jaw and was associated with sweating and shortness of breath. She did take a 81 mg aspirin after pain onset and did have some relief. She reported she received an angiogram in [place] in 2021 revealing some vessel blockage but they did not place a stent \"because the doctor said my arteries were too narrow. \" She is unsure of which arteries. Negative troponin in the ED, EKG revealing  left axis deviation with T wave inversion in precordial leads concerning for possible ischemia. CT chest was negative for pulmonary embolism. She was admitted for further evaluation.   Pharmacologic stress test 1/24 revealed abnormal myocardial perfusion with moderate reversible ischemia in the territory of the LAD. LVEF > 65%. Cardiology was consulted and completed left heart catheterization with coronary angiogram and coronary catheterization. Access was obtained through right radial artery. Results revealed less than 10% stenosis in LM, LAD, LCX, RI, RCA coronary arteries. Cardiology recommending continuing with aspirin and statin and adding metoprolol tartrate 25 mg twice daily. Her chronic conditions were also managed throughout admission including poorly controlled T2DM. Blood glucose was 359 on admission. Home metformin was held and patient was placed on SSI with POC glucose checks 4 times daily. Lantus 20 units nightly was started and patient was placed on hypoglycemia protocol. Patient was discharged with instructions to continue 20 units of Lantus nightly in addition to low-dose SSI of Humulin R with 4 times daily glucose checks. Patient was monitored after the procedure and recovered in stable condition, was discharged with medications as per below. Cardiology reporting that patient is intermediate risk and ok to proceed with incisional hernia repair next week 2/2. Recommend to follow-up with PCP within 2 weeks. Physical Exam Performed:   /66   Pulse 85   Temp 97.9 °F (36.6 °C) (Oral)   Resp 18   Ht 5' (1.524 m)   Wt 217 lb 14.4 oz (98.8 kg)   SpO2 96%   BMI 42.56 kg/m²       General appearance: No apparent distress, somnolent, appears stated age and cooperative. HEENT: Conjunctivae/corneas clear. Neck: Supple, with full range of motion. No jugular venous distention. Trachea midline. Respiratory:  Normal respiratory effort. Clear to auscultation, bilaterally without Rales/Wheezes/Rhonchi. Cardiovascular: Regular rate and rhythm with without murmurs, rubs or gallops. Abdomen: Soft, distended, LLQ tenderness secondary to existing incisional hernia   Musculoskeletal: No clubbing, cyanosis or edema bilaterally.    Skin: Skin color, texture, turgor normal.  No rashes or lesions. Neurologic:  Neurovascularly intact without any focal sensory/motor deficits. Psychiatric: Alert and oriented, thought content appropriate, normal insight  Capillary Refill: Brisk,3 seconds, normal   Peripheral Pulses: +2 palpable, equal bilaterally      Labs: For convenience and continuity at follow-up the following most recent labs are provided:      CBC:    Lab Results   Component Value Date/Time    WBC 10.6 01/26/2023 06:02 AM    HGB 14.6 01/26/2023 06:02 AM    HCT 43.8 01/26/2023 06:02 AM     01/26/2023 06:02 AM       Renal:    Lab Results   Component Value Date/Time     01/26/2023 06:02 AM    K 4.3 01/26/2023 06:02 AM     01/26/2023 06:02 AM    CO2 26 01/26/2023 06:02 AM    BUN 14 01/26/2023 06:02 AM    CREATININE 0.6 01/26/2023 06:02 AM    CALCIUM 10.0 01/26/2023 06:02 AM     Significant Diagnostic Studies    Radiology:   NM Cardiac Stress Test Nuclear Imaging   Final Result      CT SOFT TISSUE NECK W CONTRAST   Final Result   Mildly enlarged right level 2 lymph nodes, nonspecific and may be reactive in   the setting of infection. Otherwise no acute abnormality of the soft tissue   structures of the neck. US THYROID   Preliminary Result   NODULE Left mid: ACR TI-RADS TR3:      Recommend: Follow-up ultrasound in 1 year. ACR TI-RADS recommendations:      TR3 (3 points):  FNA if >= 2.5 cm; follow-up if 1.5-2.4 cm in 1, 3, and 5   years         CT CHEST PULMONARY EMBOLISM W CONTRAST   Final Result   No evidence of pulmonary embolism or acute pulmonary abnormality.             Consults:   IP CONSULT TO HOSPITALIST  IP CONSULT TO CARDIOLOGY  IP CONSULT TO SOCIAL WORK    Disposition:  home     Condition at Discharge: Stable    Discharge Instructions/Follow-up:  follow-up with PCP    Code Status:  Full Code     Activity: activity as tolerated    Diet: diabetic diet and encourage fluids      Discharge Medications:   Current Discharge Medication List Details   insulin glargine (LANTUS SOLOSTAR) 100 UNIT/ML injection pen Inject 20 Units into the skin nightly  Qty: 5 Adjustable Dose Pre-filled Pen Syringe, Refills: 0      insulin regular (HUMULIN R) 100 UNIT/ML injection Inject 0-4 Units into the skin See Admin Instructions For glucose of : 0 units  Glucose of 200-249: 1 unit  Glucose of 250-299: 2 units  Glucose of 300-349: 3 units  Glucose above 349: 4 units  Qty: 10 mL, Refills: 0      metoprolol tartrate (LOPRESSOR) 25 MG tablet Take 1 tablet by mouth 2 times daily  Qty: 60 tablet, Refills: 0      benzonatate (TESSALON) 100 MG capsule Take 1 capsule by mouth in the morning, at noon, and at bedtime for 7 days  Qty: 21 capsule, Refills: 0      predniSONE (DELTASONE) 20 MG tablet Take 2 tablets by mouth 2 times daily for 1 dose  Qty: 2 tablet, Refills: 0                Details   metFORMIN (GLUCOPHAGE-XR) 500 MG extended release tablet Take 2 tablets by mouth daily (with breakfast)  Qty: 180 tablet, Refills: 1    Associated Diagnoses: Type 2 diabetes mellitus without complication, with long-term current use of insulin (HCC)      losartan (COZAAR) 25 MG tablet Take 1 tablet by mouth daily  Qty: 90 tablet, Refills: 1      ASPIRIN LOW DOSE 81 MG EC tablet TAKE 1 TABLET BY MOUTH EVERY DAY  Qty: 90 tablet, Refills: 1      diclofenac (VOLTAREN) 75 MG EC tablet TAKE 1 TABLET BY MOUTH TWICE A DAY  Qty: 60 tablet, Refills: 0      omeprazole (PRILOSEC) 40 MG delayed release capsule Take 1 capsule by mouth every morning (before breakfast)  Qty: 90 capsule, Refills: 1      atorvastatin (LIPITOR) 40 MG tablet Take 1 tablet by mouth daily  Qty: 90 tablet, Refills: 1      cyclobenzaprine (FLEXERIL) 10 MG tablet Take 10 mg by mouth 3 times daily as needed      blood glucose test strips (ASCENSIA AUTODISC VI;ONE TOUCH ULTRA TEST VI) strip TEST BLOOD SUGARS ONCE A DAY EVERY MORNING  Qty: 100 each, Refills: 2      empagliflozin (JARDIANCE) 25 MG tablet Take 1 tablet by mouth daily  Qty: 30 tablet, Refills: 2             Time Spent on discharge is more than 30 minutes in the examination, evaluation, counseling and review of medications and discharge plan. Signed:  Ruy Fernando DO   1/26/2023      Thank you Ruy Fernando DO for the opportunity to be involved in this patient's care. If you have any questions or concerns please feel free to contact me at [phone]. Addendum to Resident DC summary note:  Pt seen,examined and evaluated with resident and discussed regarding POC . I have reviewed the current history, physical findings, labs and assessment and plan , edited the note where appropriate and agree with note as documented by resident  ( Dr. Kyra Caputo)    patient seen and evaluated on the day of discharge. Discussed [facility] findings of Non obstructive CAD and risk factor modification including diet and exercise and strict diabetes control. Patient informed about following up with appointments. Patient verbalized understanding for follow-up appointments. The patient was informed of the results of tests, a time was given to answer questions, a plan was proposed and she agreed with plan. Medical reconciliation performed. Patient discharged stable condition. On the date of discharge, the patient reported feeling stable. The patient was found to not be in any acute distress, with vital signs within normal limits, and no new abnormalities on physical examination. Further, the patient expressed appropriate understanding of, and agreement with, the discharge recommendations, medications, and plan.     Taylor Bermeo MD  Hospitalist Physician

## 2023-01-26 NOTE — PLAN OF CARE
Problem: Discharge Planning  Goal: Discharge to home or other facility with appropriate resources  Outcome: Progressing  Flowsheets (Taken 1/25/2023 2022 by Michael Weathers RN)  Discharge to home or other facility with appropriate resources:   Identify barriers to discharge with patient and caregiver   Arrange for needed discharge resources and transportation as appropriate   Identify discharge learning needs (meds, wound care, etc)

## 2023-01-27 ENCOUNTER — TELEPHONE (OUTPATIENT)
Dept: PRIMARY CARE CLINIC | Age: 53
End: 2023-01-27

## 2023-01-27 NOTE — TELEPHONE ENCOUNTER
Care Transitions Initial Follow Up Call    Outreach made within 2 business days of discharge: Yes    Patient: Cinthia Ledezma Patient : 1970   MRN: [id number]  Reason for Admission: There are no discharge diagnoses documented for the most recent discharge. Discharge Date: 23       Spoke with: pt    Discharge department/facility: [facility]    TCM Interactive Patient Contact:  Was patient able to fill all prescriptions: Yes  Was patient instructed to bring all medications to the follow-up visit: Yes  Is patient taking all medications as directed in the discharge summary?  Yes  Does patient understand their discharge instructions: Yes  Does patient have questions or concerns that need addressed prior to 7-14 day follow up office visit: no    Scheduled appointment with PCP within 7-14 days    Follow Up  Future Appointments   Date Time Provider Sekou Busch   2023  1:00 PM DO VALERIA Hanson OB/[facility]   2023  1:30  [address] [facility] OB/GYN [facility]   2023  1:15 PM María Roy DO [facility]   3/24/2023 12:30 PM DO Rabia Go  [facility]       Rah Gallegos MA

## 2023-01-30 ENCOUNTER — HOSPITAL ENCOUNTER (EMERGENCY)
Age: 53
Discharge: HOME | End: 2023-01-30

## 2023-01-30 ENCOUNTER — TELEPHONE (OUTPATIENT)
Dept: SURGERY | Age: 53
End: 2023-01-30

## 2023-01-30 ENCOUNTER — APPOINTMENT (OUTPATIENT)
Dept: CT IMAGING | Age: 53
End: 2023-01-30

## 2023-01-30 VITALS
OXYGEN SATURATION: 93 % | HEIGHT: 61 IN | HEART RATE: 77 BPM | SYSTOLIC BLOOD PRESSURE: 126 MMHG | WEIGHT: 218 LBS | TEMPERATURE: 97.6 F | DIASTOLIC BLOOD PRESSURE: 87 MMHG | BODY MASS INDEX: 41.16 KG/M2 | RESPIRATION RATE: 16 BRPM

## 2023-01-30 DIAGNOSIS — M25.531 RIGHT WRIST PAIN: Primary | ICD-10-CM

## 2023-01-30 LAB
A/G RATIO: 1.2 (ref 1.1–2.2)
ALBUMIN SERPL-MCNC: 4.1 G/DL (ref 3.4–5)
ALP BLD-CCNC: 127 U/L (ref 40–129)
ALT SERPL-CCNC: 23 U/L (ref 10–40)
ANION GAP SERPL CALCULATED.3IONS-SCNC: 12 MMOL/L (ref 3–16)
AST SERPL-CCNC: 15 U/L (ref 15–37)
BASOPHILS ABSOLUTE: 0.1 K/UL (ref 0–0.2)
BASOPHILS RELATIVE PERCENT: 0.4 %
BILIRUB SERPL-MCNC: 0.3 MG/DL (ref 0–1)
BUN BLDV-MCNC: 12 MG/DL (ref 7–20)
CALCIUM SERPL-MCNC: 9.8 MG/DL (ref 8.3–10.6)
CHLORIDE BLD-SCNC: 99 MMOL/L (ref 99–110)
CO2: 25 MMOL/L (ref 21–32)
CREAT SERPL-MCNC: 0.7 MG/DL (ref 0.6–1.1)
EOSINOPHILS ABSOLUTE: 0.2 K/UL (ref 0–0.6)
EOSINOPHILS RELATIVE PERCENT: 1.5 %
GFR SERPL CREATININE-BSD FRML MDRD: >60 ML/MIN/{1.73_M2}
GLUCOSE BLD-MCNC: 209 MG/DL (ref 70–99)
HCT VFR BLD CALC: 42.6 % (ref 36–48)
HEMOGLOBIN: 13.7 G/DL (ref 12–16)
LYMPHOCYTES ABSOLUTE: 4.8 K/UL (ref 1–5.1)
LYMPHOCYTES RELATIVE PERCENT: 35 %
MCH RBC QN AUTO: 25.6 PG (ref 26–34)
MCHC RBC AUTO-ENTMCNC: 32.1 G/DL (ref 31–36)
MCV RBC AUTO: 79.8 FL (ref 80–100)
MONOCYTES ABSOLUTE: 0.9 K/UL (ref 0–1.3)
MONOCYTES RELATIVE PERCENT: 6.4 %
NEUTROPHILS ABSOLUTE: 7.8 K/UL (ref 1.7–7.7)
NEUTROPHILS RELATIVE PERCENT: 56.7 %
PDW BLD-RTO: 16.1 % (ref 12.4–15.4)
PLATELET # BLD: 420 K/UL (ref 135–450)
PMV BLD AUTO: 7.8 FL (ref 5–10.5)
POTASSIUM SERPL-SCNC: 3.3 MMOL/L (ref 3.5–5.1)
RBC # BLD: 5.34 M/UL (ref 4–5.2)
SODIUM BLD-SCNC: 136 MMOL/L (ref 136–145)
TOTAL PROTEIN: 7.4 G/DL (ref 6.4–8.2)
WBC # BLD: 13.7 K/UL (ref 4–11)

## 2023-01-30 PROCEDURE — 99285 EMERGENCY DEPT VISIT HI MDM: CPT

## 2023-01-30 PROCEDURE — 85025 COMPLETE CBC W/AUTO DIFF WBC: CPT

## 2023-01-30 PROCEDURE — 80053 COMPREHEN METABOLIC PANEL: CPT

## 2023-01-30 PROCEDURE — 96375 TX/PRO/DX INJ NEW DRUG ADDON: CPT

## 2023-01-30 PROCEDURE — 6360000004 HC RX CONTRAST MEDICATION: Performed by: NURSE PRACTITIONER

## 2023-01-30 PROCEDURE — 73206 CT ANGIO UPR EXTRM W/O&W/DYE: CPT

## 2023-01-30 PROCEDURE — 96374 THER/PROPH/DIAG INJ IV PUSH: CPT

## 2023-01-30 PROCEDURE — 6360000002 HC RX W HCPCS: Performed by: NURSE PRACTITIONER

## 2023-01-30 RX ORDER — METHYLPREDNISOLONE SODIUM SUCCINATE 125 MG/2ML
125 INJECTION, POWDER, LYOPHILIZED, FOR SOLUTION INTRAMUSCULAR; INTRAVENOUS ONCE
Status: COMPLETED | OUTPATIENT
Start: 2023-01-30 | End: 2023-01-30

## 2023-01-30 RX ORDER — DICLOFENAC SODIUM 75 MG/1
TABLET, DELAYED RELEASE ORAL
Qty: 60 TABLET | Refills: 0 | Status: SHIPPED | OUTPATIENT
Start: 2023-01-30

## 2023-01-30 RX ORDER — DIPHENHYDRAMINE HYDROCHLORIDE 50 MG/ML
25 INJECTION INTRAMUSCULAR; INTRAVENOUS ONCE
Status: COMPLETED | OUTPATIENT
Start: 2023-01-30 | End: 2023-01-30

## 2023-01-30 RX ADMIN — IOPAMIDOL 75 ML: 755 INJECTION, SOLUTION INTRAVENOUS at 21:30

## 2023-01-30 RX ADMIN — METHYLPREDNISOLONE SODIUM SUCCINATE 125 MG: 125 INJECTION, POWDER, FOR SOLUTION INTRAMUSCULAR; INTRAVENOUS at 19:46

## 2023-01-30 RX ADMIN — DIPHENHYDRAMINE HYDROCHLORIDE 25 MG: 50 INJECTION, SOLUTION INTRAMUSCULAR; INTRAVENOUS at 19:46

## 2023-01-30 ASSESSMENT — PAIN SCALES - GENERAL: PAINLEVEL_OUTOF10: 3

## 2023-01-30 ASSESSMENT — PAIN DESCRIPTION - LOCATION: LOCATION: ARM

## 2023-01-30 ASSESSMENT — PAIN - FUNCTIONAL ASSESSMENT: PAIN_FUNCTIONAL_ASSESSMENT: 0-10

## 2023-01-30 ASSESSMENT — PAIN DESCRIPTION - ORIENTATION: ORIENTATION: RIGHT

## 2023-01-30 NOTE — TELEPHONE ENCOUNTER
Ruthie Dalal in [facility] called and said that pt had canceled sx with Dr. Olga Bucio on 2/2/23. But she said it is still on their OR schedule. Please cancel and thank you. Then Pt called and said that she would like for you to call her and reschedule sx. Her # [phone]. Thank you!

## 2023-01-30 NOTE — TELEPHONE ENCOUNTER
Refill Request       Last Seen: Last Seen Department: 1/19/2023  Last Seen by PCP: 1/19/2023    Last Written: 12/29/22    Next Appointment:   Future Appointments   Date Time Provider Department Center   2/1/2023  1:00 PM Denise Davis DO [facility] OB/GYN [facility]   2/1/2023  1:30 PM [facility] OB/GYN [facility] 1 [facility] OB/[facility]   2/9/2023  1:15 PM María Roy DO [facility]   3/24/2023 12:30 PM María Roy DO [facility]       Future appointment scheduled      Requested Prescriptions     Pending Prescriptions Disp Refills    diclofenac (VOLTAREN) 75 MG EC tablet [Pharmacy Med Name: DICLOFENAC SOD EC 75 MG TAB] 60 tablet 0     Sig: TAKE 1 TABLET BY MOUTH TWICE A DAY

## 2023-02-01 NOTE — PROGRESS NOTES
[address] and [facility]                                             [address] [facility], [address] [place] [address], [address] [place] [address]        Phone: [phone]      Name:  Jossue Sullivan  :    1970    Consultants:   Patient Care Team:  Dallas Salvador DO as PCP - General (Family Medicine)    Chief Complaint:     Jossue Sullivan is a 50 y.o. female  who presents today for an established patient care visit with Personalized Prevention Plan Services as noted below. HPI:     Jossue Sullivan is a 16-year-old female with history of GERD, left knee pain, hypertension, T2DM, hyperlipidemia, left lower abdominal hernia, history of TIAs, small ovarian dermoid/mature teratoma, class III obesity here to follow-up on chronic care. Hospital follow-up  - patient was admitted for chest pain   - s/p cardiac catheterization, showing less than 10% stenosis in coronary arteries  - recommended to continue aspirin, statin and new metoprolol tartrate 25 mg BID  - discharged with 20 units of lantus nightly with low-dose SSI humulin  - still endorses chest tightness, SOA, dizziness, feeling faint. These episodes are situational and last 20-30 minutes sometimes longer. Patient is concerned they may be more panic-like in character.     T2DM  - taking metformin 1000 mg XR  - not taking empagliflozin 25 mg, patient reports experiencing another yeast infection once restarting medication so she discontinued  - taking 20 units lantus nightly with low-dose SSI humulin - R, reports on her sugar log that sugars average 250-400 range  - diabetic eye exam due, patient will schedule  - diabetes education scheduled in April    Lab Results   Component Value Date    LABA1C 10.1 2023    LABA1C 10.7 2022    LABA1C 10.6 2022      Left lower abdominal hernia  - was supposed to have surgery 23 but cancelled due to recent hospitalization patient reports she needs cardiac clearance from cardiology     Chronic cough -- resolved  - U/S thyroid from hospitalization revealing left thyroid nodule (category TR3)  - resolved, consider pertussis    GERD  - reports well controlled on omeprazole  - no dysphagia, cough, N/V/D    Right ovarian teratoma  - patient was supposed to follow-up with OB for management and pap smear per preference, but missed her appointment     History of TIAs  -mammogram UTD  -colonoscopy due  -flu and COVID declined  -HIV and hep C declined    Patient Active Problem List   Diagnosis    TIA (transient ischemic attack)    Class 3 severe obesity with serious comorbidity and body mass index (BMI) of 40.0 to 44.9 in [facility])    Type 2 diabetes mellitus without complication, with long-term current use of insulin (HCC)    Abscess of left thigh    Sepsis ([address]    Tachycardia    Tachypnea    Neutrophilic leukocytosis    Hidradenitis suppurativa    Groin abscess    Teratoma of ovary, right    Ventral hernia without obstruction or gangrene    Hyperlipidemia    History of TIA (transient ischemic attack)    Internal carotid artery stenosis, bilateral    Primary hypertension    Chest pain         Past Medical History:    Past Medical History:   Diagnosis Date    Diabetes mellitus ([address]     Headache     Hyperlipidemia     TIA (transient ischemic attack)     , ,  per pt       Past Surgical History:  Past Surgical History:   Procedure Laterality Date     SECTION      CHOLECYSTECTOMY      LEG SURGERY Left 10/28/2022    LEFT VANI  DEBRIDEMENT INCISION AND DRAINAGE performed by Josse Milan MD at [facility]:  Prior to Visit Medications    Medication Sig Taking?  Authorizing Provider   fluconazole (DIFLUCAN) 150 MG tablet Take 1 tablet by mouth once for 1 dose If unresolved take second dose after 7 days Yes Luanne Vargas DO   Semaglutide,0.25 or 0.5MG/DOS, 2 MG/1.5ML SOPN Inject 0.25 mg into the skin once a week Yes María Roy DO   hydrOXYzine HCl (ATARAX) 50 MG tablet Take 1 tablet by mouth every 6 hours as needed for Anxiety Yes María Roy DO   diclofenac (VOLTAREN) 75 MG EC tablet TAKE 1 TABLET BY MOUTH TWICE A DAY Yes Analilia Grant DO   insulin glargine (LANTUS SOLOSTAR) 100 UNIT/ML injection pen Inject 20 Units into the skin nightly Yes María Roy DO   insulin regular (HUMULIN R) 100 UNIT/ML injection Inject 0-4 Units into the skin See Admin Instructions For glucose of : 0 units  Glucose of 200-249: 1 unit  Glucose of 250-299: 2 units  Glucose of 300-349: 3 units  Glucose above 349: 4 units Yes María Roy DO   metoprolol tartrate (LOPRESSOR) 25 MG tablet Take 1 tablet by mouth 2 times daily Yes María Roy DO   metFORMIN (GLUCOPHAGE-XR) 500 MG extended release tablet Take 2 tablets by mouth daily (with breakfast) Yes María Roy DO   losartan (COZAAR) 25 MG tablet Take 1 tablet by mouth daily Yes María Roy DO   ASPIRIN LOW DOSE 81 MG EC tablet TAKE 1 TABLET BY MOUTH EVERY DAY Yes María Roy DO   omeprazole (PRILOSEC) 40 MG delayed release capsule Take 1 capsule by mouth every morning (before breakfast) Yes María Roy DO   atorvastatin (LIPITOR) 40 MG tablet Take 1 tablet by mouth daily Yes María Roy DO   blood glucose test strips (ASCENSIA AUTODISC VI;ONE TOUCH ULTRA TEST VI) strip TEST BLOOD SUGARS ONCE A DAY EVERY MORNING Yes María Roy DO       Allergies:     Iv contrast [iodides] and Pineapple    Family History:       Problem Relation Age of Onset    Diabetes Mother     Diabetes Brother     Diabetes Brother     Breast Cancer Paternal Grandmother     Heart Disease Paternal Grandfather          Health Maintenance Completed:  Health Maintenance   Topic Date Due    COVID-19 Vaccine (1) Never done    Pneumococcal 0-64 years Vaccine (1 - PCV) Never done    HIV screen  Never done    Diabetic retinal exam  Never done    Hepatitis C screen  Never done    Hepatitis B vaccine (1 of 3 - Risk 3-dose series) Never done    DTaP/Tdap/Td vaccine (1 - Tdap) Never done    Shingles vaccine (1 of 2) Never done    Cervical cancer screen  Never done    Colorectal Cancer Screen  Never done    Flu vaccine (1) Never done    A1C test (Diabetic or Prediabetic)  04/19/2023    Diabetic foot exam  11/17/2023    Diabetic Alb to Cr ratio (uACR) test  11/17/2023    Lipids  01/23/2024    GFR test (Diabetes, CKD 3-4, OR last GFR 15-59)  01/30/2024    Depression Screen  02/09/2024    Breast cancer screen  12/06/2024    Hepatitis A vaccine  Aged Out    Hib vaccine  Aged Out    Meningococcal (ACWY) vaccine  Aged Out            There is no immunization history on file for this patient. Review of Systems:  Review of Systems   Constitutional:  Negative for chills and fever. HENT:  Negative for congestion and sore throat. Respiratory:  Negative for cough, shortness of breath and wheezing. Cardiovascular:  Negative for chest pain and palpitations. Gastrointestinal:  Negative for blood in stool, constipation, diarrhea and nausea. Neurological:  Negative for dizziness, weakness and headaches. Psychiatric/Behavioral: endorses panic -- situational chest tightness, racing heart, nausea episodes that last 20-30 minutes are sporadic     Physical Exam:   Vitals:    02/09/23 1306   BP: 102/60   Pulse: 99   Temp: 98.5 °F (36.9 °C)   SpO2: 97%   Weight: 227 lb 12.8 oz (103.3 kg)   Height: 5' 1\" (1.549 m)     Body mass index is 43.04 kg/m². Wt Readings from Last 3 Encounters:   02/09/23 227 lb 12.8 oz (103.3 kg)   01/30/23 218 lb (98.9 kg)   01/26/23 217 lb 14.4 oz (98.8 kg)       BP Readings from Last 3 Encounters:   02/09/23 102/60   01/30/23 126/87   01/26/23 117/75       Physical Exam  Constitutional:       Appearance: Normal appearance. HENT:      Head: Normocephalic.       Right Ear: External ear normal.      Left Ear: External ear normal.      Nose: Nose normal.   Eyes:      Conjunctiva/sclera: Conjunctivae normal.   Cardiovascular:      Rate and Rhythm: Normal rate and regular rhythm. Heart sounds: No murmur heard. No gallop. Pulmonary:      Effort: Pulmonary effort is normal.      Breath sounds: Normal breath sounds. Abdominal:      General: Abdomen is flat. Skin:     General: Skin is warm. Neurological:      General: No focal deficit present. Mental Status: She is alert and oriented to person, place, and time. Psychiatric:         Mood and Affect: Mood normal.         Behavior: Behavior normal.          Lab Review:   Lab Results   Component Value Date/Time     01/30/2023 07:40 PM    K 3.3 01/30/2023 07:40 PM    K 4.3 01/26/2023 06:02 AM    CL 99 01/30/2023 07:40 PM    CO2 25 01/30/2023 07:40 PM    BUN 12 01/30/2023 07:40 PM    CREATININE 0.7 01/30/2023 07:40 PM    GLUCOSE 209 01/30/2023 07:40 PM    CALCIUM 9.8 01/30/2023 07:40 PM     Lab Results   Component Value Date/Time    TROPONINI <0.01 01/22/2023 11:46 PM     Lab Results   Component Value Date/Time    WBC 13.7 01/30/2023 07:40 PM    HGB 13.7 01/30/2023 07:40 PM    HCT 42.6 01/30/2023 07:40 PM    MCV 79.8 01/30/2023 07:40 PM     01/30/2023 07:40 PM     Lab Results   Component Value Date/Time    CHOL 185 01/23/2023 06:13 AM    TRIG 93 01/23/2023 06:13 AM    HDL 40 01/23/2023 06:13 AM    LDLDIRECT 96 01/19/2023 01:29 PM          Assessment/Plan:    Jose Larson is a 61-year-old female with history of GERD, left knee pain, hypertension, T2DM, hyperlipidemia, left lower abdominal hernia, history of TIAs, small ovarian dermoid/mature teratoma, class III obesity here to follow-up on chronic care.     Vaginal Candidiasis  -likely secondary to empagliflozin use as this is her second colonization in the last month  -start fluconazole 150 mg oral for one dose, can take second dose 7 days later if continuing symptoms  -discontinue empagliflozin due to patient request to try another agent  -follow up in 2 months    T2DM with current insulin use  -Uncontrolled, POC A1c 10.1% 1/19/2023  -Continue metformin XR 1000 mg daily  -discontinue empagliflozin 25 mg p.o. daily  -start Semaglutide,0.25 or 0.5MG/DOS, 2 MG/1.5ML SOPN; Inject 0.25 mg into the skin once a week, can increase dose at next appointment if tolerated  -continue lantus 20 units nightly  -continue humulin R sliding scale  -Diabetic foot exam completed 11/17  -Diabetic eye exam due, patient will schedule  - follow up in 2 months    Lab Results   Component Value Date    LABA1C 10.1 01/19/2023    LABA1C 10.7 11/17/2022    LABA1C 10.6 11/17/2022     Panic attacks  -not well controlled  -patient endorsing infrequent episodes consistent with panic attacks, she is not interested in SSRI therapy  -due to infrequency of attacks and to limit her anxiety and use of ambulatory urgent care services recommend trial of hydroxyzine PRN to assess symptomatic improvement with these episodes  -     hydrOXYzine HCl (ATARAX) 50 MG tablet;  Take 1 tablet by mouth every 6 hours as needed for Anxiety  - will reassess at next visit    Hypertension  - at goal per EUGENIO guidelines, /60 today  - continue losartan 25 mg oral daily, ACEi caused cough  - recommend home blood pressure readings with bicep cuff  - discussed dietary and lifestyle modifications including DASH diet and 150 minutes of moderate-high intensity exercise weekly    The 10-year ASCVD risk score (Eliot ELENA, et al., 2019) is: 2.7%    Values used to calculate the score:      Age: 50 years      Sex: Female      Is Non- : No      Diabetic: Yes      Tobacco smoker: No      Systolic Blood Pressure: 773 mmHg      Is BP treated: Yes      HDL Cholesterol: 40 mg/dL      Total Cholesterol: 185 mg/dL      GERD  - controlled  - continue omeprazole 40 mg daily  - recommend sitting upright for 30 min to 1 hour after meals  - follow up in 2 months     Left Knee Pain  -likely osteoarthritis  -Ddx: gout, patellar tendonitis, medial meniscus tear, bursitis  - continue diclofenac 75 mg twice daily for pain and inflammation  - recommend stretching and strengthening exercises for functional improvement  -patient declining formal physical therapy at this time due to difficulties with transportation, discussed reconsidering in the future if pain is not improved with conservative management  - follow-up to reassess in 2 months    History of TIAs  -continue aspirin 81 mg daily  -continue atorvastatin 40 mg daily  -Encourage rescheduling consult with vascular surgeon for evaluation and management of bilateral proximal ICA plaques and stenosis   - follow up in 2 months    Left lower abdominal hernia  Chronic pain  - symptomatic, found on CT abdomen  -patient was taking cyclobenzaprine 10 mg TID for pain, also reports past tramadol use for abdominal pain  -patient needs to reschedule with surgery. Needs cardiac clearance prior to surgery  -referral to cardiology:   -     Benito Daily MD, Cardiology, [facility]  - follow up in 2 months    Small ovarian dermoid/mature teratoma  Need for screening pap smear  - Small ovarian dermoid/mature teratoma in the right ovary found on CT scan 10/28/2022  - referral to OB for further management, appointment missed, encouraged to reschedule    Class III Obesity  -With Body mass index is 43.04 kg/m². Complicating assessment and treatment. Placing patient at risk for multiple co-morbidities as well as early death and contributing to the patient's presentation. Counseled on weight loss.    -recommend 150 minutes of moderate-intensity physical activity weekly and 2 days of muscle strengthening activity (CDC, 2022)  -recommend dietary modifications including avoiding processed / refined carbohydrates, and encouraging healthy proteins (chicken, eggs, fish, lean meats) and fats (butter, avocado)    Health Maintenance  - mammogram completed, negative  - no past colonoscopy, order placed last visit, pt reports she needs cardiac clearance  - Patient declines vaccines including flu and covid today  - HIV, HepC will check next visit  - pap smear will complete with OB per patient preference    Health Maintenance Due:  Health Maintenance Due   Topic Date Due    COVID-19 Vaccine (1) Never done    Pneumococcal 0-64 years Vaccine (1 - PCV) Never done    HIV screen  Never done    Diabetic retinal exam  Never done    Hepatitis C screen  Never done    Hepatitis B vaccine (1 of 3 - Risk 3-dose series) Never done    DTaP/Tdap/Td vaccine (1 - Tdap) Never done    Shingles vaccine (1 of 2) Never done    Cervical cancer screen  Never done    Colorectal Cancer Screen  Never done    Flu vaccine (1) Never done          Health care decision maker:  <72years old  Vot-ER:        Health Maintenance: (USPSTF Recommendations)  (F) Breast Cancer Screen: (40-49 (C), 50-74 biennial screening mammogram (B))  (F) Cervical Cancer Screen: (21-29 q3yr cytology alone; 30-65 q3yr cytology alone, q5yr with hrHPV alone, or q5yr cytology+hrHPV (A))  (M) Prostate Cancer Screen: (54-79 yo discuss benefits/harm, does not recommend testing PSA in men >73 yo (D):   (M) AAA Screen: (men 73-69 yo who has ever smoked (B), consider in nonsmokers if high risk):  CRC/Colonoscopy Screening: (adults 39-53 (B), 50-75 (A))  Lung Ca Screening: Annual LDCT (+smoker age 49-80, smoked within 15 years, total of 20 pack yr history (B)):  DEXA Screen: (women >65 and older, <65 if at risk/postmenopausal (B))  HIV Screen: (16-65 yr old, and all pregnant patients (A)): Hep C Screen: (18-79 yr old (B)):  HCC Screen: (all pts with cirrhosis and high risk Hep B (US q6 mo)):  Immunizations:    RTC:  Return in about 2 months (around 4/9/2023) for chronic condition f/u with Dr. Yamilka Zee.      EMR Dragon/transcription disclaimer:  Much of this encounter note is electronic transcription/translation of spoken language to printed texts. The electronic translation of spoken language may be erroneous, or at times, nonsensical words or phrases may be inadvertently transcribed.   Although I have reviewed the note for such errors, some may still exist.     Pete Bautista DO, PGY-1   [facility] and [facility]

## 2023-02-01 NOTE — TELEPHONE ENCOUNTER
Returned pts call to r/s her surgery - I asked pt if she has established care to see a cardiologist for HTN, internal carotid bilateral artery stenosis, tachycardia, & TIA - Pt stated she forgot to do this - I explained that she will need cardiac clearance b/f her surgery and to schedule with her cardiologist first before we schedule her surgery in the event it may take longer for her to get established with a new heart doctor - Asked pt to call back once she can obtain clearance - Pt understood and agreed w/ above noted

## 2023-02-03 NOTE — ED PROVIDER NOTES
[facility]  ED  EMERGENCY DEPARTMENT ENCOUNTER        Pt Name: Aminta Barrios  MRN: [id number]  [place] 1970  Date of evaluation: 2023  Provider: JOHANNY Calixto - MILAN  PCP: Cholo Souza DO  Note Started: 10:25 PM EST 23      BALDEV. I have evaluated this patient. My supervising physician was available for consultation. CHIEF COMPLAINT       Chief Complaint   Patient presents with    Circulatory Problem     Patient had a stent placed on . Was discharged home on  and had some abnormal bruising but otherwise normal until she woke up from a nap this PM and stated that her left hand was numb and colder than usual. Per cardiologist came in for further eval.       HISTORY OF PRESENT ILLNESS: 1 or more Elements     History from : Patient    Limitations to history : None    Aminta Barrios is a 50 y.o. female who presents to the emergency department today with complaints of right wrist pain, patient states that she woke up from her nap and felt like her hand was numb and colder than usual.  Patient has a left heart cath performed where they went to the right radial artery. There is no swelling there is some surrounding ecchymosis. Patient states that she did talk to cardiology they told her to come in for evaluation. Nursing Notes were all reviewed and agreed with or any disagreements were addressed in the HPI. REVIEW OF SYSTEMS :      Review of Systems    Positives and Pertinent negatives as per HPI.      SURGICAL HISTORY     Past Surgical History:   Procedure Laterality Date     SECTION      CHOLECYSTECTOMY      LEG SURGERY Left 10/28/2022    LEFT VANI  DEBRIDEMENT INCISION AND DRAINAGE performed by Flaco Puri MD at [address]       Discharge Medication List as of 2023 10:45 PM        CONTINUE these medications which have NOT CHANGED    Details   diclofenac (VOLTAREN) 75 MG EC tablet TAKE 1 TABLET BY MOUTH TWICE A DAY, Disp-60 tablet, R-0Normal      insulin glargine (LANTUS SOLOSTAR) 100 UNIT/ML injection pen Inject 20 Units into the skin nightly, Disp-5 Adjustable Dose Pre-filled Pen Syringe, R-0Normal      insulin regular (HUMULIN R) 100 UNIT/ML injection Inject 0-4 Units into the skin See Admin Instructions For glucose of : 0 units  Glucose of 200-249: 1 unit  Glucose of 250-299: 2 units  Glucose of 300-349: 3 units  Glucose above 349: 4 units, Disp-10 mL, R-0Normal      metoprolol tartrate (LOPRESSOR) 25 MG tablet Take 1 tablet by mouth 2 times daily, Disp-60 tablet, R-0Normal      benzonatate (TESSALON) 100 MG capsule Take 1 capsule by mouth in the morning, at noon, and at bedtime for 7 days, Disp-21 capsule, R-0Normal      metFORMIN (GLUCOPHAGE-XR) 500 MG extended release tablet Take 2 tablets by mouth daily (with breakfast), Disp-180 tablet, R-1Normal      losartan (COZAAR) 25 MG tablet Take 1 tablet by mouth daily, Disp-90 tablet, R-1Normal      ASPIRIN LOW DOSE 81 MG EC tablet TAKE 1 TABLET BY MOUTH EVERY DAY, Disp-90 tablet, R-1Normal      omeprazole (PRILOSEC) 40 MG delayed release capsule Take 1 capsule by mouth every morning (before breakfast), Disp-90 capsule, R-1Normal      atorvastatin (LIPITOR) 40 MG tablet Take 1 tablet by mouth daily, Disp-90 tablet, R-1Normal      cyclobenzaprine (FLEXERIL) 10 MG tablet Take 10 mg by mouth 3 times daily as neededHistorical Med      blood glucose test strips (ASCENSIA AUTODISC VI;ONE TOUCH ULTRA TEST VI) strip Disp-100 each, R-2, NormalTEST BLOOD SUGARS ONCE A DAY EVERY MORNING      empagliflozin (JARDIANCE) 25 MG tablet Take 1 tablet by mouth daily, Disp-30 tablet, R-2Normal             ALLERGIES     Iv contrast [iodides] and Pineapple    FAMILYHISTORY       Family History   Problem Relation Age of Onset    Diabetes Mother     Diabetes Brother     Diabetes Brother     Breast Cancer Paternal Grandmother     Heart Disease Paternal Grandfather         SOCIAL HISTORY       Social History     Tobacco Use    Smoking status: Never    Smokeless tobacco: Never   Substance Use Topics    Alcohol use: Never    Drug use: Never       SCREENINGS        Daniel Coma Scale  Eye Opening: Spontaneous  Best Verbal Response: Oriented  Best Motor Response: Obeys commands  Daniel Coma Scale Score: 15                CIWA Assessment  BP: 126/87  Heart Rate: 77           PHYSICAL EXAM  1 or more Elements     ED Triage Vitals [01/30/23 1837]   BP Temp Temp Source Heart Rate Resp SpO2 Height Weight   116/73 97.6 °F (36.4 °C) Oral 85 18 99 % 5' 1\" (1.549 m) 218 lb (98.9 kg)       Physical Exam    No disparity in temperature amongst hands.  Right hand has good color, good brisk cap refill 2+ radial pulse there is some ecchymosis present around the radial aspect of the wrist.  Other extremities atraumatic nontender    DIAGNOSTIC RESULTS   LABS:    Labs Reviewed   CBC WITH AUTO DIFFERENTIAL - Abnormal; Notable for the following components:       Result Value    WBC 13.7 (*)     RBC 5.34 (*)     MCV 79.8 (*)     MCH 25.6 (*)     RDW 16.1 (*)     Neutrophils Absolute 7.8 (*)     All other components within normal limits   COMPREHENSIVE METABOLIC PANEL - Abnormal; Notable for the following components:    Potassium 3.3 (*)     Glucose 209 (*)     All other components within normal limits       When ordered only abnormal lab results are displayed. All other labs were within normal range or not returned as of this dictation.    EKG: When ordered, EKG's are interpreted by the Emergency Department Physician in the absence of a cardiologist.  Please see their note for interpretation of EKG.    RADIOLOGY:   Non-plain film images such as CT, Ultrasound and MRI are read by the radiologist. Plain radiographic images are visualized and preliminarily interpreted by the ED Provider with the below findings:        Interpretation per the Radiologist below, if available at the  time of this note:    CTA UPPER EXTREMITY RIGHT W CONTRAST   Final Result   Diminished contrast opacification in the distal radial and ulnar arteries   beginning at the level of the wrist, favored to be related to bolus timing. No appreciable contrast is noted in the arteries beyond this level. However,   no abrupt occlusion is identified. Suggest correlation with Doppler pulses. The remainder of the arteries in the right upper extremity are normal.           CTA UPPER EXTREMITY RIGHT W CONTRAST    Result Date: 1/30/2023  EXAMINATION: CTA OF THE RIGHT UPPER EXTREMITY 1/30/2023 9:08 pm TECHNIQUE: CTA of the right upper extremity was performed after the administration of intravenous contrast. Multiplanar reformatted images are provided for review. MIP images are provided for review. Automated exposure control, iterative reconstruction, and/or weight based adjustment of the mA/kV was utilized to reduce the radiation dose to as low as reasonably achievable. COMPARISON: None. HISTORY ORDERING SYSTEM PROVIDED HISTORY: post cath, hand numbness TECHNOLOGIST PROVIDED HISTORY: Reason for exam:->post cath, hand numbness Reason for Exam: Woke up today, arm warm and hand cold feeling some pain Additional signs and symptoms: with bruising Relevant Medical/Surgical History: Recent heart cath FINDINGS: Vascular: Incidental note of a bovine arch, a common anatomic variant. The right subclavian, axillary brachial, proximal to distal radial and ulnar arteries are patent without dissection or evidence of injury. Gradual loss of contrast opacification in both the radial and ulnar arteries at the level of the wrist is likely related to bolus timing. No appreciable contrast opacification is noted within the arteries beyond this level. No abrupt occlusion is identified. Bones: No evidence of acute fracture or dislocation. No aggressive appearing osseous abnormality or periostitis.  Soft Tissue: No significant soft tissue edema or fluid collections. Joint: No dislocation or joint effusion. Diminished contrast opacification in the distal radial and ulnar arteries beginning at the level of the wrist, favored to be related to bolus timing. No appreciable contrast is noted in the arteries beyond this level. However, no abrupt occlusion is identified. Suggest correlation with Doppler pulses. The remainder of the arteries in the right upper extremity are normal.       No results found. PROCEDURES   Unless otherwise noted below, none     Procedures    CRITICAL CARE TIME (.cctime)       PAST MEDICAL HISTORY      has a past medical history of Diabetes mellitus ([address] Headache, Hyperlipidemia, and TIA (transient ischemic attack). Chronic Conditions affecting Care:     EMERGENCY DEPARTMENT COURSE and DIFFERENTIAL DIAGNOSIS/MDM:   Vitals:    Vitals:    01/30/23 1837 01/30/23 2155 01/30/23 2224   BP: 116/73 129/80 126/87   Pulse: 85 77 77   Resp: 18 18 16   Temp: 97.6 °F (36.4 °C)     TempSrc: Oral     SpO2: 99% 95% 93%   Weight: 218 lb (98.9 kg)     Height: 5' 1\" (1.549 m)         Patient was given the following medications:  Medications   methylPREDNISolone sodium (SOLU-MEDROL) injection 125 mg (125 mg IntraVENous Given 1/30/23 1946)   diphenhydrAMINE (BENADRYL) injection 25 mg (25 mg IntraVENous Given 1/30/23 1946)   iopamidol (ISOVUE-370) 76 % injection 75 mL (75 mLs IntraVENous Given 1/30/23 2130)             Is this patient to be included in the SEP-1 Core Measure due to severe sepsis or septic shock? No   Exclusion criteria - the patient is NOT to be included for SEP-1 Core Measure due to:   Infection is not suspected    CONSULTS: (Who and What was discussed)  None  Discussion with Other Profesionals : None    Social Determinants : None    Records Reviewed : None    CC/HPI Summary, DDx, ED Course, and Reassessment: Patient presented to the emergency room today with complaints of right wrist pain, hand being cold after left heart cath.  Patient's exam was fairly unremarkable she had equal temperature and there is no significant pallor of right hand. She had 2+ radial pulse, I did do a CTA which showed no definite arterial occlusion, given this finding and her physical exam I have a low concern for any type of arterial blockage. Patient was instructed to follow-up with cardiology as an outpatient, return to the ED for emergent worsening symptoms        I am the Primary Clinician of Record. FINAL IMPRESSION      1.  Right wrist pain          DISPOSITION/PLAN     DISPOSITION Decision to Discharge    PATIENT REFERRED TO:  Ana Rosado DO  [address] [facility] [address]  [phone]    Call   For follow up      DISCHARGE MEDICATIONS:  Discharge Medication List as of 1/30/2023 10:45 PM          DISCONTINUED MEDICATIONS:  Discharge Medication List as of 1/30/2023 10:45 PM                 (Please note that portions of this note were completed with a voice recognition program.  Efforts were made to edit the dictations but occasionally words are mis-transcribed.)    JOHANNY Mckeon CNP (electronically signed)      JOHANNY Mckeon CNP  02/02/23 2007

## 2023-02-07 RX ORDER — EMPAGLIFLOZIN 25 MG/1
TABLET, FILM COATED ORAL
Qty: 30 TABLET | Refills: 2 | Status: SHIPPED | OUTPATIENT
Start: 2023-02-07 | End: 2023-02-09

## 2023-02-07 NOTE — TELEPHONE ENCOUNTER
Refill Request       Last Seen: Last Seen Department: 1/19/2023  Last Seen by PCP: 1/19/2023    Last Written: 11/17/22    Next Appointment:   Future Appointments   Date Time Provider Sekou Busch   2/9/2023  1:15 PM Analilia Grant DO [facility]   3/24/2023 12:30 PM Analilia Grant DO [facility] [address] [facility]       Future appointment scheduled      Requested Prescriptions     Pending Prescriptions Disp Refills    JARDIANCE 25 MG tablet [Pharmacy Med Name: Zane Ward 25 MG TABLET] 30 tablet 2     Sig: TAKE 1 TABLET BY MOUTH EVERY DAY

## 2023-02-09 ENCOUNTER — OFFICE VISIT (OUTPATIENT)
Dept: PRIMARY CARE CLINIC | Age: 53
End: 2023-02-09

## 2023-02-09 VITALS
BODY MASS INDEX: 43.01 KG/M2 | SYSTOLIC BLOOD PRESSURE: 102 MMHG | HEART RATE: 99 BPM | HEIGHT: 61 IN | TEMPERATURE: 98.5 F | DIASTOLIC BLOOD PRESSURE: 60 MMHG | OXYGEN SATURATION: 97 % | WEIGHT: 227.8 LBS

## 2023-02-09 DIAGNOSIS — Z00.00 HEALTHCARE MAINTENANCE: ICD-10-CM

## 2023-02-09 DIAGNOSIS — E11.9 TYPE 2 DIABETES MELLITUS WITHOUT COMPLICATION, WITH LONG-TERM CURRENT USE OF INSULIN (HCC): ICD-10-CM

## 2023-02-09 DIAGNOSIS — F41.0 PANIC ATTACKS: ICD-10-CM

## 2023-02-09 DIAGNOSIS — Z79.4 TYPE 2 DIABETES MELLITUS WITHOUT COMPLICATION, WITH LONG-TERM CURRENT USE OF INSULIN (HCC): ICD-10-CM

## 2023-02-09 DIAGNOSIS — D27.0 TERATOMA OF OVARY, RIGHT: ICD-10-CM

## 2023-02-09 DIAGNOSIS — Z09 HOSPITAL DISCHARGE FOLLOW-UP: Primary | ICD-10-CM

## 2023-02-09 DIAGNOSIS — R07.9 CHEST PAIN, UNSPECIFIED TYPE: ICD-10-CM

## 2023-02-09 DIAGNOSIS — B37.31 VAGINAL CANDIDIASIS: ICD-10-CM

## 2023-02-09 RX ORDER — FLUCONAZOLE 150 MG/1
TABLET ORAL
COMMUNITY
Start: 2023-01-19 | End: 2023-02-09 | Stop reason: ALTCHOICE

## 2023-02-09 RX ORDER — FLUCONAZOLE 150 MG/1
150 TABLET ORAL ONCE
Qty: 2 TABLET | Refills: 0 | Status: SHIPPED | OUTPATIENT
Start: 2023-02-09 | End: 2023-02-09

## 2023-02-09 RX ORDER — HYDROXYZINE 50 MG/1
50 TABLET, FILM COATED ORAL EVERY 6 HOURS PRN
Qty: 30 TABLET | Refills: 0 | Status: SHIPPED | OUTPATIENT
Start: 2023-02-09 | End: 2023-02-24

## 2023-02-09 SDOH — ECONOMIC STABILITY: FOOD INSECURITY: WITHIN THE PAST 12 MONTHS, YOU WORRIED THAT YOUR FOOD WOULD RUN OUT BEFORE YOU GOT MONEY TO BUY MORE.: NEVER TRUE

## 2023-02-09 SDOH — ECONOMIC STABILITY: FOOD INSECURITY: WITHIN THE PAST 12 MONTHS, THE FOOD YOU BOUGHT JUST DIDN'T LAST AND YOU DIDN'T HAVE MONEY TO GET MORE.: NEVER TRUE

## 2023-02-09 SDOH — ECONOMIC STABILITY: INCOME INSECURITY: HOW HARD IS IT FOR YOU TO PAY FOR THE VERY BASICS LIKE FOOD, HOUSING, MEDICAL CARE, AND HEATING?: NOT HARD AT ALL

## 2023-02-09 SDOH — ECONOMIC STABILITY: HOUSING INSECURITY
IN THE LAST 12 MONTHS, WAS THERE A TIME WHEN YOU DID NOT HAVE A STEADY PLACE TO SLEEP OR SLEPT IN A SHELTER (INCLUDING NOW)?: NO

## 2023-02-09 ASSESSMENT — PATIENT HEALTH QUESTIONNAIRE - PHQ9
SUM OF ALL RESPONSES TO PHQ QUESTIONS 1-9: 5
1. LITTLE INTEREST OR PLEASURE IN DOING THINGS: 0
8. MOVING OR SPEAKING SO SLOWLY THAT OTHER PEOPLE COULD HAVE NOTICED. OR THE OPPOSITE, BEING SO FIGETY OR RESTLESS THAT YOU HAVE BEEN MOVING AROUND A LOT MORE THAN USUAL: 0
6. FEELING BAD ABOUT YOURSELF - OR THAT YOU ARE A FAILURE OR HAVE LET YOURSELF OR YOUR FAMILY DOWN: 0
3. TROUBLE FALLING OR STAYING ASLEEP: 1
10. IF YOU CHECKED OFF ANY PROBLEMS, HOW DIFFICULT HAVE THESE PROBLEMS MADE IT FOR YOU TO DO YOUR WORK, TAKE CARE OF THINGS AT HOME, OR GET ALONG WITH OTHER PEOPLE: 1
7. TROUBLE CONCENTRATING ON THINGS, SUCH AS READING THE NEWSPAPER OR WATCHING TELEVISION: 1
9. THOUGHTS THAT YOU WOULD BE BETTER OFF DEAD, OR OF HURTING YOURSELF: 0
SUM OF ALL RESPONSES TO PHQ QUESTIONS 1-9: 5
5. POOR APPETITE OR OVEREATING: 1
2. FEELING DOWN, DEPRESSED OR HOPELESS: 1
4. FEELING TIRED OR HAVING LITTLE ENERGY: 1
SUM OF ALL RESPONSES TO PHQ9 QUESTIONS 1 & 2: 1

## 2023-02-09 ASSESSMENT — ANXIETY QUESTIONNAIRES
3. WORRYING TOO MUCH ABOUT DIFFERENT THINGS: 2
7. FEELING AFRAID AS IF SOMETHING AWFUL MIGHT HAPPEN: 1
5. BEING SO RESTLESS THAT IT IS HARD TO SIT STILL: 0
6. BECOMING EASILY ANNOYED OR IRRITABLE: 2
IF YOU CHECKED OFF ANY PROBLEMS ON THIS QUESTIONNAIRE, HOW DIFFICULT HAVE THESE PROBLEMS MADE IT FOR YOU TO DO YOUR WORK, TAKE CARE OF THINGS AT HOME, OR GET ALONG WITH OTHER PEOPLE: SOMEWHAT DIFFICULT
2. NOT BEING ABLE TO STOP OR CONTROL WORRYING: 1
GAD7 TOTAL SCORE: 8
4. TROUBLE RELAXING: 1
1. FEELING NERVOUS, ANXIOUS, OR ON EDGE: 1

## 2023-02-09 NOTE — Clinical Note
Patient has multiple chronic conditions with very poor follow-up. 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 Limited transportation. She needs to schedule with cardiology to be cleared for an abdominal hernia surgery and colonoscopy. She also needs to follow-up with OBGyn for evaluation of an ovarian teratoma.

## 2023-02-09 NOTE — PATIENT INSTRUCTIONS
Schedule diabetic eye exam  Reschedule obgyn appointment  Call cardiology for hospital follow up and surgery clearance  Colonoscopy reschedule  Hernia repair reschedule

## 2023-02-09 NOTE — Clinical Note
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 She needs to schedule with cardiology per referral I placed for clearance to proceed with her abdominal hernia surgery and colonoscopy. She also needs to reschedule with OBGyn to manage her ovarian teratoma and pap smear. I am working with her to get her blood glucose under better control but would like assistance with helping her maintain her coordinate these follow-up appointments.  Thank you

## 2023-02-13 ENCOUNTER — CARE COORDINATION (OUTPATIENT)
Dept: CARE COORDINATION | Age: 53
End: 2023-02-13

## 2023-02-13 DIAGNOSIS — Z79.4 TYPE 2 DIABETES MELLITUS WITHOUT COMPLICATION, WITH LONG-TERM CURRENT USE OF INSULIN (HCC): ICD-10-CM

## 2023-02-13 DIAGNOSIS — I10 PRIMARY HYPERTENSION: Primary | ICD-10-CM

## 2023-02-13 DIAGNOSIS — E11.9 TYPE 2 DIABETES MELLITUS WITHOUT COMPLICATION, WITH LONG-TERM CURRENT USE OF INSULIN (HCC): ICD-10-CM

## 2023-02-13 NOTE — CARE COORDINATION
Remote Patient Kit Ordering Note      Date/Time:  2/13/2023 1:58 PM      [x] CCSS confirmed patient shipping address  [x] Patient will receive package over the next 2-4 business days. Someone 21 years or older must be present to sign for UPS delivery. [x] Patient to contact virtual installation-specific phone number listed in the patient instructions. [x] If the patient does not contact HRS within 24 hours, an [employer] [facility] [place] will call the patient directly: If the patient does not answer, HRS will follow up with the clinical team notifying them about the unsuccessful attempt to contact the patient. HRS will make three call attempts to the patient. [x] LPN will contact patient once equipment is active to welcome them to the program.                                                         [x] Hours of RPM monitoring - Monday-Friday [id number]                     All questions answered at this time. ACM made aware the RPM kit has been ordered. CCSS notified patient of RPM equipment order.

## 2023-02-13 NOTE — CARE COORDINATION
I agree with the Prateek PATEL, PGY-1   [address] [facility] and [facility] Medicine Residency

## 2023-02-13 NOTE — CARE COORDINATION
Ambulatory Care Coordination Note  2023    Patient Current Location:  Home: [address]  [address]  [place] [address]    ACM contacted the patient by telephone. Verified name and  with patient as identifiers. Provided introduction to self, and explanation of the ACM role.     ACM: Ifrah Hernandez RN    Challenges to be reviewed by the provider   Additional needs identified to be addressed with provider: No  none               Method of communication with provider: phone.    Spoke to patient for initial cc screening from pcp referral. Patient reports no falls or injuries. Grab bars and tub mat not present in home. Well lit, reduced clutter. Patient needs no assistive devices for gait. Patient tests fbs daily. Last night value was 310. Last a1c 23 10.1%. Patient does not monitor bp. Offered rpm. Patient would like to try so the care team can see her values. Patient may want to speak to a dietician in future for diet modifications. No financial issues reported at this time. No additional questions, concerns. Patient is calling cardiology today to schedule an appointment.  Plan:  POC to pcp and Chari ESCOBEDO, send [facility]  RD referral readiness for diabetic diet modifications  Did patient fu with cardio    Offered patient enrollment in the Remote Patient Monitoring (RPM) program for in-home monitoring: Yes, patient enrolled:     Remote Patient Monitoring Enrollment Note      Date/Time: 2023 8:42 AM    Offered patient enrollment in the [facility] Remote Patient Monitoring (RPM) program for in home monitoring for Diabetes and HTN.  Patient accepted RPM services.    Patient will be monitoring the following daily:  blood pressure reading, blood pressure heart rate, glucose reading, pulse ox reading, pulse ox heart rate, and temperature reading    ACM reviewed the information below with patient:    Emergency Contact (name and contact number): chloé chavez (Spouse)   [phone] (Mobile)    [x] A member from  the care coordination team will reach out to notify the patient once the RPM kit is ordered. [x] Once the kit is delivered, the [facility] team will contact the patient after UPS deliver to assist with set up. [x] Determined BP cuff size: regular (9.05\"-15.74\")                                                     [x] Hours of ACM monitoring - Monday-Friday [id number]                     All questions about RPM program answered at this time. .    Ambulatory Care Coordination Assessment    Care Coordination Protocol  Referral from Primary Care Provider: Yes  Week 1 - Initial Assessment     Do you have all of your prescriptions and are they filled?: Yes  Barriers to medication adherence: None  Are you able to afford your medications?: Yes  How often do you have trouble taking your medications the way you have been told to take them?: I always take them as prescribed. Do you have Home O2 Therapy?: No      Ability to seek help/take action for Emergent Urgent situations i.e. fire, crime, inclement weather or health crisis. : Independent  Ability to ambulate to restroom: Independent  Ability handle personal hygeine needs (bathing/dressing/grooming): Independent  Ability to manage Medications: Independent  Ability to prepare Food Preparation: Independent  Ability to maintain home (clean home, laundry): Independent  Ability to drive and/or has transportation: Independent  Ability to do shopping: Independent  Ability to manage finances:  Independent  Is patient able to live independently?: Yes     Current Housing: Apartment        Per the Fall Risk Screening, did the patient have 2 or more falls or 1 fall with injury in the past year?: No     Frequent urination at night?: No  Do you use rails/bars?: No  Do you have a non-slip tub mat?: No     Are you experiencing loss of meaning?: No  Are you experiencing loss of hope and peace?: No     Thinking about your patient's physical health needs, are there any symptoms or problems (risk indicators) you are unsure about that require further investigation?: No identified areas of uncertainly or problems already being investigated   Are the patients physical health problems impacting on their mental well-being?: No identified areas of concern   Are there any problems with your patients lifestyle behaviors (alcohol, drugs, diet, exercise) that are impacting on physical or mental well-being?: No identified areas of concern   Do you have any other concerns about your patients mental well-being? How would you rate their severity and impact on the patient?: No identified areas of concern   How would you rate their home environment in terms of safety and stability (including domestic violence, insecure housing, neighbor harassment)?: Safe, stable, but with some inconsistency   How do daily activities impact on the patient's well-being? (include current or anticipated unemployment, work, caregiving, access to transportation or other): No identified problems or perceived positive benefits   How would you rate their social network (family, work, friends)?: Good participation with social networks   How would you rate their financial resources (including ability to afford all required medical care)?: Financially secure, resources adequate, no identified problems   How wells does the patient now understand their health and well-being (symptoms, signs or risk factors) and what they need to do to manage their health?: Little understanding which impacts on their ability to undertake better management   How well do you think your patient can engage in healthcare discussions?  (Barriers include language, deafness, aphasia, alcohol or drug problems, learning difficulties, concentration): Clear and open communication, no identified barriers   Do other services need to be involved to help this patient?: Other care/services not in place and required   Are current services involved with this patient well-coordinated? (Include coordination with other services you are now recommendation): Required care/services missing and/or fragmented   Suggested Interventions and Community Resources                  Future Appointments   Date Time Provider Sekou Busch   3/24/2023 12:30 PM María Schreiber DO [facility] AND [facility]     ,   Diabetes Assessment    Medic Alert ID: No  Meal Planning: Avoidance of concentrated sweets   How often do you test your blood sugar?: Daily   Do you have barriers with adherence to non-pharmacologic self-management interventions?  (Nutrition/Exercise/Self-Monitoring): No   Have you ever had to go to the ED for symptoms of low blood sugar?: No       No patient-reported symptoms        , and   General Assessment    Do you have any symptoms that are causing concern?: No          I agree with the [address] Amy Burger DO, PGY-1   [address] [place] [address] and [facility] Medicine Residency

## 2023-02-13 NOTE — PROGRESS NOTES
2/13/23 6:44 PM       Remote Patient Monitoring Treatment Plan    Received request from ACM/FARHEEN Pillai RN to order remote patient monitoring for in home monitoring of Diabetes and HTN and order completed. Patient will be monitoring blood pressure   glucose  pulse ox . Patient will engage in Remote Patient Monitoring each day to develop the skills necessary for self management. RPM Care Team Responsibilities:   Alerts will be reviewed daily and addressed within 2-4 hours during operational hours (Monday -Friday 9 am-4 pm)  Alert response and intervention documented in patient medical record  Alert response escalated to PCP per protocol and documented in patient medical record  Patient monitored over approximately  days  Discharge from program based on self-management readiness    See care coordination encounters for additional details.       Brandon Munoz DNP, FNP-C, Remote Patient Monitoring NP, () [phone]

## 2023-02-15 ENCOUNTER — CARE COORDINATION (OUTPATIENT)
Dept: CARE COORDINATION | Age: 53
End: 2023-02-15

## 2023-02-15 NOTE — CARE COORDINATION
Remote Patient Monitoring Note      Date/Time:  2/15/2023 2:38 PM  Patient Current Location: [facility]  LPN contacted patient by telephone regarding red alert received for blood pressure reading (82/55).    Background: Pt enrolled for HTN and DM    Tried to call x4, it says \"call failed\" and does not ring     Current Patient Metrics ---- Blood Pressure: 82/55, 87bpm Glucose: 267mg/dl Pulseox: 98%, 88bpm Survey: C Weight: 223.5lbs Note Created at: 02/15/2023 02:39 PM ET ---- Time-Spent: 3 minutes 0 seconds

## 2023-02-16 ENCOUNTER — CARE COORDINATION (OUTPATIENT)
Dept: CARE COORDINATION | Age: 53
End: 2023-02-16

## 2023-02-16 NOTE — CARE COORDINATION
CCSS reviewed patients reported daily Remote Patient Monitoring metrics. All reported metrics are within alert parameters. Plan/Follow Up:  Will continue to review, monitor and address alerts with follow up based on severity of symptoms and risk factors      Current Patient Metrics ---- Blood Pressure: 124/82, 75bpm Glucose: 194mg/dl Pulseox: 98%, 73bpm Survey: C Weight: 225.0lbs Note Created at: 02/16/2023 11:23 AM CT ---- Time-Spent: 2 minutes 0 seconds    Balwinder JacoboNov[facility]   Cell: [phone]

## 2023-02-17 ENCOUNTER — CARE COORDINATION (OUTPATIENT)
Dept: CARE COORDINATION | Age: 53
End: 2023-02-17

## 2023-02-17 NOTE — CARE COORDINATION
Remote Patient Monitoring Note      Date/Time:  2/17/2023 3:36 PM    LPN reviewed patients reported daily Remote Patient Monitoring metrics. Patient has not updated daily metrics at this time. Plan/Follow Up:  Will continue to review, monitor and address alerts with follow up based on severity of symptoms and risk factors

## 2023-02-20 ENCOUNTER — CARE COORDINATION (OUTPATIENT)
Dept: CARE COORDINATION | Age: 53
End: 2023-02-20

## 2023-02-20 NOTE — CARE COORDINATION
Remote Patient Monitoring Note      Date/Time:  2/20/2023 4:17 PM    LPN reviewed patients reported daily Remote Patient Monitoring metrics. All reported metrics are within alert parameters. Plan/Follow Up:  Will continue to review, monitor and address alerts with follow up based on severity of symptoms and risk factors   Tried to call for welcome call but it says \"call failed\"    ---- Current Patient Metrics ---- Blood Pressure: 116/85, 69bpm Glucose: 166mg/dl Pulseox: 97%, 66bpm Survey: C Weight: -lbs Note Created at: 02/20/2023 04:17 PM ET ---- Time-Spent: 2 minutes 0 seconds

## 2023-02-21 ENCOUNTER — CARE COORDINATION (OUTPATIENT)
Dept: CARE COORDINATION | Age: 53
End: 2023-02-21

## 2023-02-21 NOTE — CARE COORDINATION
Remote Patient Monitoring Note      Date/Time:  2023 3:38 PM    LPN reviewed patients reported daily Remote Patient Monitoring metrics. All reported metrics are within alert parameters. Pt is not obtaining weights      Plan/Follow Up: Will continue to review, monitor and address alerts with follow up based on severity of symptoms and risk factors     Spoke with patient, she reports she thought the weight was once a week because the tablet has a gray X beside the weight metric. Pt educated that she can obtain weights daily. She also reports the new kit smells strongly of cigarette smoke- will escalate this issue to HRS ASAP. Pt denied wanting to exchange kits at this time. Remote Patient Monitoring Welcome Note      Date/Time:  2023 4:09 PM  Patient Current Location: [facility]    Verified patients name and  as identifiers. Completed and confirmed the following:     Emergency Contact:  Angy Zabaal (Spouse) [phone] (Mobile)    [x] Patient received all RPM equipment (tablet, scale, blood pressure device and cuff, and pulse oximeter)  Cuff Size: Small  []  Regular   [x]  Large  []   Weight Scale: Regular   [x]  Bariatric  []               [x] Instructed patient keep box for use when returning equipment                                                          [x] Reviewed Patient Welcome Letter with patient                         [x] Reviewed expectations for patient and care team       [x] Instructed patient to keep scale on flat surface                                                         [x] Instructed patient to keep tablet plugged in at all times                         [x] Instructed how to contact IT support (number listed on welcome letter)  [x] Provided Remote Patient Monitoring care  information                 All questions answered at this time.     -- Current Patient Metrics ---- Blood Pressure: 121/78, 79bpm Glucose: 192mg/dl Pulseox: 97%, 78bpm Survey: - Weight: -lbs Note Created at: 02/21/2023 04:10 PM ET ---- Time-Spent: 10 minutes 0 seconds

## 2023-02-22 ENCOUNTER — CARE COORDINATION (OUTPATIENT)
Dept: CARE COORDINATION | Age: 53
End: 2023-02-22

## 2023-02-22 NOTE — CARE COORDINATION
Remote Patient Monitoring Note      Date/Time:  2/22/2023 2:45 PM    CCSS reviewed patients reported daily Remote Patient Monitoring metrics. All reported metrics are within alert parameters. Plan/Follow Up:  Will continue to review, monitor and address alerts with follow up based on severity of symptoms and risk factors  --- Current Patient Metrics ---- Blood Pressure: 109/77, 82bpm Glucose: 190mg/dl Pulseox: 97%, 80bpm Survey: - Weight: 225.0lbs Note Created at: 02/22/2023 02:46 PM ET ---- Time-Spent: 2 minutes 0 seconds

## 2023-02-23 ENCOUNTER — CARE COORDINATION (OUTPATIENT)
Dept: CARE COORDINATION | Age: 53
End: 2023-02-23

## 2023-02-23 NOTE — CARE COORDINATION
Remote Patient Monitoring Note      Date/Time:  2/23/2023 1:59 PM    CCSS reviewed patients reported daily Remote Patient Monitoring metrics. All reported metrics are within alert parameters. Plan/Follow Up:  Will continue to review, monitor and address alerts with follow up based on severity of symptoms and risk factors  --- Current Patient Metrics ---- Blood Pressure: 112/81, 73bpm Glucose: 198mg/dl Pulseox: 97%, 70bpm Survey: - Weight: 225.0lbs Note Created at: 02/23/2023 02:00 PM ET ---- Time-Spent: 2 minutes 0 seconds

## 2023-02-24 ENCOUNTER — CARE COORDINATION (OUTPATIENT)
Dept: CARE COORDINATION | Age: 53
End: 2023-02-24

## 2023-02-24 NOTE — CARE COORDINATION
Remote Patient Monitoring Note      Date/Time:  2/24/2023 3:30 PM    CCSS reviewed patients reported daily Remote Patient Monitoring metrics. All reported metrics are within alert parameters. Plan/Follow Up:  Will continue to review, monitor and address alerts with follow up based on severity of symptoms and risk factors  ---- Current Patient Metrics ---- Blood Pressure: 112/78, 84bpm Glucose: 265mg/dl Pulseox: 97%, 82bpm Survey: - Weight: 226.0lbs Note Created at: 02/24/2023 03:30 PM ET ---- Time-Spent: 2 minutes 0 seconds

## 2023-02-27 ENCOUNTER — CARE COORDINATION (OUTPATIENT)
Dept: CARE COORDINATION | Age: 53
End: 2023-02-27

## 2023-02-27 RX ORDER — DICLOFENAC SODIUM 75 MG/1
TABLET, DELAYED RELEASE ORAL
Qty: 60 TABLET | Refills: 1 | Status: SHIPPED | OUTPATIENT
Start: 2023-02-27

## 2023-02-27 NOTE — CARE COORDINATION
Remote Patient Monitoring Note      Date/Time:  2/27/2023 12:28 PM    CCSS reviewed patients reported daily Remote Patient Monitoring metrics. All reported metrics are within alert parameters. Plan/Follow Up:  Will continue to review, monitor and address alerts with follow up based on severity of symptoms and risk factors    Current Patient Metrics ---- Blood Pressure: 130/86, 76bpm Glucose: 138mg/dl Pulseox: 96%, 68bpm Survey: - Weight: 227.5lbs Note Created at: 02/27/2023 12:28 PM ET ---- Time-Spent: 2 minutes 0 seconds

## 2023-02-27 NOTE — TELEPHONE ENCOUNTER
Refill Request       Last Seen: Last Seen Department: 2/9/2023  Last Seen by PCP: 2/9/2023    Last Written: 01/30/2023 #60 with no refills     Next Appointment:   Future Appointments   Date Time Provider Sekou Busch   3/24/2023 12:30 PM María Brown DO [facility] AND [facility]         Requested Prescriptions     Pending Prescriptions Disp Refills    diclofenac (VOLTAREN) 75 MG EC tablet [Pharmacy Med Name: DICLOFENAC SOD EC 75 MG TAB] 60 tablet 0     Sig: TAKE 1 TABLET BY MOUTH TWICE A DAY

## 2023-02-28 ENCOUNTER — CARE COORDINATION (OUTPATIENT)
Dept: CARE COORDINATION | Age: 53
End: 2023-02-28

## 2023-02-28 NOTE — CARE COORDINATION
Remote Patient Monitoring Note      Date/Time:  2/28/2023 12:02 PM    CCSS reviewed patients reported daily Remote Patient Monitoring metrics. All reported metrics are within alert parameters.     Plan/Follow Up: Will continue to review, monitor and address alerts with follow up based on severity of symptoms and risk factors  ---- Current Patient Metrics ---- Blood Pressure: 119/81, 74bpm Glucose: 161mg/dl Pulseox: 97%, 71bpm Survey: - Weight: 227.5lbs Note Created at: 02/28/2023 12:03 PM ET ---- Time-Spent: 2 minutes 0 seconds

## 2023-03-01 ENCOUNTER — CARE COORDINATION (OUTPATIENT)
Dept: CARE COORDINATION | Age: 53
End: 2023-03-01

## 2023-03-01 NOTE — CARE COORDINATION
Remote Patient Monitoring Note      Date/Time:  3/1/2023 1:26 PM    CCSS reviewed patients reported daily Remote Patient Monitoring metrics. All reported metrics are within alert parameters. Plan/Follow Up:  Will continue to review, monitor and address alerts with follow up based on severity of symptoms and risk factors  - Current Patient Metrics ---- Blood Pressure: 113/89, 88bpm Glucose: 170mg/dl Pulseox: 96%, 87bpm Survey: - Weight: 226.5lbs Note Created at: 03/01/2023 01:26 PM ET ---- Time-Spent: 2 minutes 0 seconds

## 2023-03-02 ENCOUNTER — CARE COORDINATION (OUTPATIENT)
Dept: CARE COORDINATION | Age: 53
End: 2023-03-02

## 2023-03-02 NOTE — CARE COORDINATION
Remote Patient Monitoring Note      Date/Time:  3/2/2023 2:50 PM    CCSS reviewed patients reported daily Remote Patient Monitoring metrics. Patient has not updated daily metrics at this time. Plan/Follow Up:  Will continue to review, monitor and address alerts with follow up based on severity of symptoms and risk factors

## 2023-03-03 ENCOUNTER — CARE COORDINATION (OUTPATIENT)
Dept: CARE COORDINATION | Age: 53
End: 2023-03-03

## 2023-03-03 NOTE — CARE COORDINATION
Remote Patient Monitoring Note      Date/Time:  3/3/2023 12:33 PM    CCSS reviewed patients reported daily Remote Patient Monitoring metrics. All reported metrics are within alert parameters. Plan/Follow Up:  Will continue to review, monitor and address alerts with follow up based on severity of symptoms and risk factors  Current Patient Metrics ---- Blood Pressure: 128/85, 75bpm Glucose: 165mg/dl Pulseox: 98%, 73bpm Survey: - Weight: 224.5lbs Note Created at: 03/03/2023 12:33 PM ET ---- Time-Spent: 2 minutes 0 seconds

## 2023-03-06 ENCOUNTER — CARE COORDINATION (OUTPATIENT)
Dept: CARE COORDINATION | Age: 53
End: 2023-03-06

## 2023-03-06 NOTE — CARE COORDINATION
Remote Patient Monitoring Note      Date/Time:  3/6/2023 9:28 AM    CCSS reviewed patients reported daily Remote Patient Monitoring metrics. All reported metrics are within alert parameters. Plan/Follow Up:  Will continue to review, monitor and address alerts with follow up based on severity of symptoms and risk factors  Current Patient Metrics ---- Blood Pressure: 132/92, 73bpm Glucose: 160mg/dl Pulseox: 98%, 75bpm Survey: C Weight: 224.5lbs Note Created at: 03/06/2023 09:28 AM ET ---- Time-Spent: 2 minutes 0 seconds

## 2023-03-07 ENCOUNTER — CARE COORDINATION (OUTPATIENT)
Dept: CARE COORDINATION | Age: 53
End: 2023-03-07

## 2023-03-07 NOTE — TELEPHONE ENCOUNTER
metoprolol tartrate (LOPRESSOR) 25 MG tablet  [id number] [place] [facility] [address] [address] [phone]    Pt states that she is only taking this once ryann

## 2023-03-07 NOTE — TELEPHONE ENCOUNTER
Refill Request       Last Seen: Last Seen Department: 2/9/2023  Last Seen by PCP: 2/9/2023    Last Written: metoprolol tartrate (LOPRESSOR) 25 MG tablet-1/26/2023 60 with 0 refills    Next Appointment:   Future Appointments   Date Time Provider Sekou Busch   3/24/2023 12:30 PM María Vyas DO [facility]   4/7/2023  1:15 PM MD [place]             Requested Prescriptions     Pending Prescriptions Disp Refills    metoprolol tartrate (LOPRESSOR) 25 MG tablet 60 tablet 0     Sig: Take 1 tablet by mouth 2 times daily

## 2023-03-07 NOTE — CARE COORDINATION
Remote Patient Monitoring Note      Date/Time:  3/7/2023 1:58 PM    CCSS reviewed patients reported daily Remote Patient Monitoring metrics. All reported metrics are within alert parameters. Plan/Follow Up:  Will continue to review, monitor and address alerts with follow up based on severity of symptoms and risk factors   Current Patient Metrics ---- Blood Pressure: 124/87, 68bpm Glucose: 140mg/dl Pulseox: 96%, 69bpm Survey: - Weight: 225.0lbs Note Created at: 03/07/2023 01:58 PM ET ---- Time-Spent: 2 minutes 0 seconds

## 2023-03-08 ENCOUNTER — CARE COORDINATION (OUTPATIENT)
Dept: CARE COORDINATION | Age: 53
End: 2023-03-08

## 2023-03-08 NOTE — CARE COORDINATION
Remote Patient Monitoring Note      Date/Time:  3/8/2023 1:22 PM    CCSS reviewed patients reported daily Remote Patient Monitoring metrics. All reported metrics are within alert parameters. Plan/Follow Up:  Will continue to review, monitor and address alerts with follow up based on severity of symptoms and risk factors  Current Patient Metrics ---- Blood Pressure: 136/96, 70bpm Glucose: 150mg/dl Pulseox: 98%, 77bpm Survey: - Weight: 226.5lbs Note Created at: 03/08/2023 01:22 PM ET ---- Time-Spent: 2 minutes 0 seconds

## 2023-03-09 ENCOUNTER — CARE COORDINATION (OUTPATIENT)
Dept: CARE COORDINATION | Age: 53
End: 2023-03-09

## 2023-03-09 NOTE — CARE COORDINATION
Remote Patient Monitoring Note      Date/Time:  3/9/2023 2:07 PM    CCSS reviewed patients reported daily Remote Patient Monitoring metrics. All reported metrics are within alert parameters. Plan/Follow Up:  Will continue to review, monitor and address alerts with follow up based on severity of symptoms and risk factors  Current Patient Metrics ---- Blood Pressure: 117/88, 77bpm Glucose: 210mg/dl Pulseox: 98%, 76bpm Survey: - Weight: 226.5lbs Note Created at: 03/09/2023 02:07 PM ET ---- Time-Spent: 2 minutes 0 seconds

## 2023-03-10 ENCOUNTER — CARE COORDINATION (OUTPATIENT)
Dept: CARE COORDINATION | Age: 53
End: 2023-03-10

## 2023-03-10 NOTE — CARE COORDINATION
Remote Patient Monitoring Note      Date/Time:  3/10/2023 12:47 PM    CCSS reviewed patients reported daily Remote Patient Monitoring metrics. All reported metrics are within alert parameters. Plan/Follow Up:  Will continue to review, monitor and address alerts with follow up based on severity of symptoms and risk factors   Current Patient Metrics ---- Blood Pressure: 124/83, 71bpm Glucose: 160mg/dl Pulseox: 98%, 78bpm Survey: - Weight: 226.0lbs Note Created at: 03/10/2023 12:47 PM ET ---- Time-Spent: 2 minutes 0 seconds

## 2023-03-11 DIAGNOSIS — Z79.4 TYPE 2 DIABETES MELLITUS WITHOUT COMPLICATION, WITH LONG-TERM CURRENT USE OF INSULIN (HCC): ICD-10-CM

## 2023-03-11 DIAGNOSIS — E11.9 TYPE 2 DIABETES MELLITUS WITHOUT COMPLICATION, WITH LONG-TERM CURRENT USE OF INSULIN (HCC): ICD-10-CM

## 2023-03-13 ENCOUNTER — CARE COORDINATION (OUTPATIENT)
Dept: CARE COORDINATION | Age: 53
End: 2023-03-13

## 2023-03-13 RX ORDER — SEMAGLUTIDE 1.34 MG/ML
INJECTION, SOLUTION SUBCUTANEOUS
Qty: 1 ADJUSTABLE DOSE PRE-FILLED PEN SYRINGE | Refills: 1 | Status: SHIPPED | OUTPATIENT
Start: 2023-03-13

## 2023-03-13 NOTE — CARE COORDINATION
Remote Patient Monitoring Note      Date/Time:  3/13/2023 4:17 PM    LPN reviewed patients reported daily Remote Patient Monitoring metrics. All reported metrics are within alert parameters. Plan/Follow Up:  Will continue to review, monitor and address alerts with follow up based on severity of symptoms and risk factors   -- Current Patient Metrics ---- Blood Pressure: 102/79, 85bpm Glucose: 180mg/dl Pulseox: 96%, 85bpm Survey: - Weight: 227.0lbs Note Created at: 03/13/2023 04:17 PM ET ---- Time-Spent: 2 minutes 0 seconds

## 2023-03-13 NOTE — TELEPHONE ENCOUNTER
Refill Request       Last Seen: Last Seen Department: 2/9/2023  Last Seen by PCP: 2/9/2023    Last Written: 2/9/2023 4 with 0 refills     Next Appointment:   Future Appointments   Date Time Provider Sekou Jo-Ann   4/7/2023  1:15 PM MD Melita Rojas             Requested Prescriptions     Pending Prescriptions Disp Refills    OZEMPIC, 0.25 OR 0.5 MG/DOSE, 2 MG/1.5ML SOPN [Pharmacy Med Name: OZEMPIC 0.25-0.5 MG/DOSE PEN]       Sig: INJECT 0.25MG INTO THE SKIN ONE TIME PER WEEK

## 2023-03-13 NOTE — CARE COORDINATION
Remote Patient Monitoring Note      Date/Time:  3/13/2023 3:46 PM    CCSS reviewed patients reported daily Remote Patient Monitoring metrics. Patient has not updated daily metrics at this time. Plan/Follow Up:  Will continue to review, monitor and address alerts with follow up based on severity of symptoms and risk factors  --- Current Patient Metrics ---- Blood Pressure: -/-, -bpm Glucose: -mg/dl Pulseox: -%, -bpm Survey: - Weight: -lbs Note Created at: 03/13/2023 03:46 PM ET ---- Time-Spent: 2 minutes 0 seconds

## 2023-03-14 ENCOUNTER — CARE COORDINATION (OUTPATIENT)
Dept: CARE COORDINATION | Age: 53
End: 2023-03-14

## 2023-03-14 NOTE — CARE COORDINATION
Remote Patient Monitoring Note      Date/Time:  3/14/2023 2:19 PM    CCSS reviewed patients reported daily Remote Patient Monitoring metrics. Patient has not updated daily metrics at this time. Plan/Follow Up:  Will continue to review, monitor and address alerts with follow up based on severity of symptoms and risk factors

## 2023-03-15 ENCOUNTER — CARE COORDINATION (OUTPATIENT)
Dept: CARE COORDINATION | Age: 53
End: 2023-03-15

## 2023-03-15 DIAGNOSIS — G47.33 OSA (OBSTRUCTIVE SLEEP APNEA): Primary | ICD-10-CM

## 2023-03-15 SDOH — ECONOMIC STABILITY: INCOME INSECURITY: IN THE LAST 12 MONTHS, WAS THERE A TIME WHEN YOU WERE NOT ABLE TO PAY THE MORTGAGE OR RENT ON TIME?: NO

## 2023-03-15 SDOH — ECONOMIC STABILITY: TRANSPORTATION INSECURITY
IN THE PAST 12 MONTHS, HAS LACK OF TRANSPORTATION KEPT YOU FROM MEETINGS, WORK, OR FROM GETTING THINGS NEEDED FOR DAILY LIVING?: NO

## 2023-03-15 SDOH — ECONOMIC STABILITY: TRANSPORTATION INSECURITY
IN THE PAST 12 MONTHS, HAS THE LACK OF TRANSPORTATION KEPT YOU FROM MEDICAL APPOINTMENTS OR FROM GETTING MEDICATIONS?: NO

## 2023-03-15 SDOH — ECONOMIC STABILITY: HOUSING INSECURITY: IN THE LAST 12 MONTHS, HOW MANY PLACES HAVE YOU LIVED?: 1

## 2023-03-15 NOTE — CARE COORDINATION
Ambulatory Care Coordination Note  3/15/2023    Patient Current Location:  Home: [place] [id number]  [address] [facility] [address]    ACM contacted the patient by telephone. Verified name and  with patient as identifiers. Provided introduction to self, and explanation of the ACM role. ACM: Lesley Acevedo DO    ACM outreach to patient to [facility] and introduce her to care coordination and the role of the ACM. Patient has accepted follow up calls and resources. Patient is a very pleasant 50 y.o. female who lives at home with her  and son. Patient notes that they moved from [place] one year ago,and has slowly transitioned her care to [place]. Patient has a hx of uncontrolled DM, with previous BS in the 400's. She notes that now she is on a better medication schedule and her BS is ranging from 120-140's. Patient test daily and notes no s/s of hyper/hypoglycemia at this time. Patient is active with RPM program, and readings reviewed. Patient vitals are stable, with no concerns. Patient is the primary caregiver for her medically complex , she notes he is active with [employer] and they are coming to do an evaluation for hand rails in the bathroom. Patient currently does not drive and depends on family to provide transportation. ACM reminded patient that transportation though her insurance is an option. Patient noted she did not have any phone numbers or a card. ACM will outreach to get her the phone number to call for transportation. Patient had a CPAP while in [place], but left it by mistake, she notes shipping is to expensive to have it sent to [place]. She notes that her machine is three plus years old. ACM explained that she can follow with pulmonology here and discuss options to have her machine replaced, however it may be an out of pocket cost d/t the machine not being old. Patient noted understanding.      Patient notes she will be traveling to [place] from the  to April first and during that time she will not have RPM equipment with her. ACM will advise RPM team so it can be paused. Patient denies any other needs or concerns at that time. Plan   POC to PCP  Referral to Pulm for VANNESA evaluation   CCSS to assist in locating phone number for transportation via insurance   Zone for DM and HTN resources to be mailed to patient   Follow up two weeks    Offered patient enrollment in the Remote Patient Monitoring (RPM) program for in-home monitoring: Yes, patient already enrolled. Ambulatory Care Coordination Assessment    Care Coordination Protocol  Referral from Primary Care Provider: Yes  Week 1 - Initial Assessment     Do you have all of your prescriptions and are they filled?: Yes  Barriers to medication adherence: None  Are you able to afford your medications?: Yes  How often do you have trouble taking your medications the way you have been told to take them?: I always take them as prescribed. Do you have Home O2 Therapy?: No      Ability to seek help/take action for Emergent Urgent situations i.e. fire, crime, inclement weather or health crisis. : Independent  Ability to ambulate to restroom: Independent  Ability handle personal hygeine needs (bathing/dressing/grooming): Independent  Ability to manage Medications: Independent  Ability to prepare Food Preparation: Independent  Ability to maintain home (clean home, laundry): Independent  Ability to drive and/or has transportation: Independent  Ability to do shopping: Independent  Ability to manage finances:  Independent  Is patient able to live independently?: Yes     Current Housing: Apartment        Per the Fall Risk Screening, did the patient have 2 or more falls or 1 fall with injury in the past year?: No     Frequent urination at night?: No  Do you use rails/bars?: No  Do you have a non-slip tub mat?: No     Are you experiencing loss of meaning?: No  Are you experiencing loss of hope and peace?: No     Thinking about your patient's physical health needs, are there any symptoms or problems (risk indicators) you are unsure about that require further investigation?: No identified areas of uncertainly or problems already being investigated   Are the patients physical health problems impacting on their mental well-being?: No identified areas of concern   Are there any problems with your patients lifestyle behaviors (alcohol, drugs, diet, exercise) that are impacting on physical or mental well-being?: No identified areas of concern   Do you have any other concerns about your patients mental well-being? How would you rate their severity and impact on the patient?: No identified areas of concern   How would you rate their home environment in terms of safety and stability (including domestic violence, insecure housing, neighbor harassment)?: Safe, stable, but with some inconsistency   How do daily activities impact on the patient's well-being? (include current or anticipated unemployment, work, caregiving, access to transportation or other): No identified problems or perceived positive benefits   How would you rate their social network (family, work, friends)?: Good participation with social networks   How would you rate their financial resources (including ability to afford all required medical care)?: Financially secure, resources adequate, no identified problems   How wells does the patient now understand their health and well-being (symptoms, signs or risk factors) and what they need to do to manage their health?: Little understanding which impacts on their ability to undertake better management   How well do you think your patient can engage in healthcare discussions?  (Barriers include language, deafness, aphasia, alcohol or drug problems, learning difficulties, concentration): Clear and open communication, no identified barriers   Do other services need to be involved to help this patient?: Other care/services not in place and required   Are current services involved with this patient well-coordinated? (Include coordination with other services you are now recommendation): Required care/services missing and/or fragmented   Suggested Interventions and Community Resources  Diabetes Education: In Process    Zone Management Tools: In Process                  Future Appointments   Date Time Provider Sekou Jo-Ann   4/7/2023  1:15 PM MD Aida Barkley     ,   Diabetes Assessment    Medic Alert ID: No  Meal Planning: Avoidance of concentrated sweets   How often do you test your blood sugar?: Daily   Do you have barriers with adherence to non-pharmacologic self-management interventions?  (Nutrition/Exercise/Self-Monitoring): No   Have you ever had to go to the ED for symptoms of low blood sugar?: No       No patient-reported symptoms        , and   General Assessment    Do you have any symptoms that are causing concern?: No

## 2023-03-15 NOTE — CARE COORDINATION
I agree with the [address] DO, PGY-1   [address] [facility]

## 2023-03-15 NOTE — CARE COORDINATION
Ambulatory Care Coordination Note  3/15/2023    Patient Current Location:  Home: [place] [address]  [address] [facility] [address]    ACM contacted the patient by telephone. Verified name and  with patient as identifiers. Provided introduction to self, and explanation of the ACM role. ACM: Wilian Goodrich RN    ACM outreach to patient to [facility] and introduce her to care coordination and the role of the ACM. Patient has accepted follow up calls and resources. Patient is a very pleasant 50 y.o. female who lives at home with her  and son. Patient notes that they moved from [place] one year ago,and has slowly transitioned her care to [place]. Patient has a hx of uncontrolled DM, with previous BS in the 400's. She notes that now she is on a better medication schedule and her BS is ranging from 120-140's. Patient test daily and notes no s/s of hyper/hypoglycemia at this time. Patient is active with RPM program, and readings reviewed. Patient vitals are stable, with no concerns. Patient is the primary caregiver for her medically complex , she notes he is active with [employer] and they are coming to do an evaluation for hand rails in the bathroom. Patient currently does not drive and depends on family to provide transportation. ACM reminded patient that transportation though her insurance is an option. Patient noted she did not have any phone numbers or a card. ACM will outreach to get her the phone number to call for transportation. Patient had a CPAP while in [place], but left it by mistake, she notes shipping is to expensive to have it sent to [place]. She notes that her machine is three plus years old. ACM explained that she can follow with pulmonology here and discuss options to have her machine replaced, however it may be an out of pocket cost d/t the machine not being old. Patient noted understanding.      Patient notes she will be traveling to [place] from the  to April first and during that time she will not have RPM equipment with her. ACM will advise RPM team so it can be paused. Patient denies any other needs or concerns at that time. Plan   POC to PCP  Referral to Pulm for VANNESA evaluation   CCSS to assist in locating phone number for transportation via insurance   Zone for DM and HTN resources to be mailed to patient   Follow up two weeks    Offered patient enrollment in the Remote Patient Monitoring (RPM) program for in-home monitoring: Yes, patient already enrolled. Ambulatory Care Coordination Assessment    Care Coordination Protocol  Referral from Primary Care Provider: Yes  Week 1 - Initial Assessment     Do you have all of your prescriptions and are they filled?: Yes  Barriers to medication adherence: None  Are you able to afford your medications?: Yes  How often do you have trouble taking your medications the way you have been told to take them?: I always take them as prescribed. Do you have Home O2 Therapy?: No      Ability to seek help/take action for Emergent Urgent situations i.e. fire, crime, inclement weather or health crisis. : Independent  Ability to ambulate to restroom: Independent  Ability handle personal hygeine needs (bathing/dressing/grooming): Independent  Ability to manage Medications: Independent  Ability to prepare Food Preparation: Independent  Ability to maintain home (clean home, laundry): Independent  Ability to drive and/or has transportation: Independent  Ability to do shopping: Independent  Ability to manage finances:  Independent  Is patient able to live independently?: Yes     Current Housing: Apartment        Per the Fall Risk Screening, did the patient have 2 or more falls or 1 fall with injury in the past year?: No     Frequent urination at night?: No  Do you use rails/bars?: No  Do you have a non-slip tub mat?: No     Are you experiencing loss of meaning?: No  Are you experiencing loss of hope and peace?: No     Thinking about your patient's physical health needs, are there any symptoms or problems (risk indicators) you are unsure about that require further investigation?: No identified areas of uncertainly or problems already being investigated   Are the patients physical health problems impacting on their mental well-being?: No identified areas of concern   Are there any problems with your patients lifestyle behaviors (alcohol, drugs, diet, exercise) that are impacting on physical or mental well-being?: No identified areas of concern   Do you have any other concerns about your patients mental well-being? How would you rate their severity and impact on the patient?: No identified areas of concern   How would you rate their home environment in terms of safety and stability (including domestic violence, insecure housing, neighbor harassment)?: Safe, stable, but with some inconsistency   How do daily activities impact on the patient's well-being? (include current or anticipated unemployment, work, caregiving, access to transportation or other): No identified problems or perceived positive benefits   How would you rate their social network (family, work, friends)?: Good participation with social networks   How would you rate their financial resources (including ability to afford all required medical care)?: Financially secure, resources adequate, no identified problems   How wells does the patient now understand their health and well-being (symptoms, signs or risk factors) and what they need to do to manage their health?: Little understanding which impacts on their ability to undertake better management   How well do you think your patient can engage in healthcare discussions?  (Barriers include language, deafness, aphasia, alcohol or drug problems, learning difficulties, concentration): Clear and open communication, no identified barriers   Do other services need to be involved to help this patient?: Other care/services not in place and required   Are current services involved with this patient well-coordinated? (Include coordination with other services you are now recommendation): Required care/services missing and/or fragmented   Suggested Interventions and Community Resources  Diabetes Education: In Process    Zone Management Tools: In Process                  Future Appointments   Date Time Provider Sekou Busch   4/7/2023  1:15 PM MD Robb Dang     ,   Diabetes Assessment    Medic Alert ID: No  Meal Planning: Avoidance of concentrated sweets   How often do you test your blood sugar?: Daily   Do you have barriers with adherence to non-pharmacologic self-management interventions?  (Nutrition/Exercise/Self-Monitoring): No   Have you ever had to go to the ED for symptoms of low blood sugar?: No       No patient-reported symptoms        , and   General Assessment    Do you have any symptoms that are causing concern?: No

## 2023-03-15 NOTE — CARE COORDINATION
Per [facility] Request- patient needs replacement card and information on transportation benefits.    Called [employer] [place] Medicaid provider line:  [phone]    New card -Patient should Call [phone] to request new card. Will arrive in approximately a week.    Transportation- $0 copay. Patient would need to call [email or web address] [phone] to schedule transportation or can call local medicaid office as well and they can schedule too.  Need to give 24/48 hour notice. Medical transportation only.    Ref#[id number]       Routed to [facility]

## 2023-03-27 ENCOUNTER — CARE COORDINATION (OUTPATIENT)
Dept: CARE COORDINATION | Age: 53
End: 2023-03-27

## 2023-03-27 NOTE — CARE COORDINATION
Remote Patient Monitoring Note      Date/Time:  3/27/2023 3:15 PM  Patient Current Location: [facility]  LPN contacted patient by telephone regarding red alert received for glucose reading (368). Verified patients name and  as identifiers. Background: PT enrolled for DM and HTN     Clinical Interventions:  Spoke with pt, she reports she knows why her BG is elevated, she woke up today with a headache and thought it was lack of caffeine. She had some mt dew and also coffee prior to checking BG, she is in [place] for vacation and also notes other foods/drinks she usually doesn't have. She has her insulin and will monitor it, she feels well. Will cont to monitor     Plan/Follow Up: Will continue to review, monitor and address alerts with follow up based on severity of symptoms and risk factors.

## 2023-03-28 ENCOUNTER — CARE COORDINATION (OUTPATIENT)
Dept: CASE MANAGEMENT | Age: 53
End: 2023-03-28

## 2023-03-28 NOTE — CARE COORDINATION
Remote Patient Monitoring Note  Date/Time:  3/28/2023 1:52PM  Patient Current Location:   LPN contacted patient by telephone regarding yellow alert received for NO WEIGHT X 2 DAYS Verified patients name and  as identifiers. Background: ENROLLED IN RPM FOR HTN DM  Clinical Interventions: Reviewed and followed up on alerts and treatments-Spoke to [facility] regarding no weight metrics x8 days. Pt states she is in [place] at this time. She has been monitoring vitals but did not bring her scale. Explained to pt. Her tablet will be paused until return home. Pt states she will return on 3/29/23. Educated patient to call and update when she has returned. Verbalized understanding. Plan/Follow Up: Will continue to review, monitor and address alerts with follow up based on severity of symptoms and risk factors.      - Current Patient Metrics ---- Blood Pressure: 124/82, 86bpm Glucose: 255mg/dl Pulseox: 98%, 87bpm Survey: - Weight: -lbs Note Created at: 2023 01:50 PM ET ---- Time-Spent: 4 minutes 0 seconds

## 2023-03-29 ENCOUNTER — CARE COORDINATION (OUTPATIENT)
Dept: CARE COORDINATION | Age: 53
End: 2023-03-29

## 2023-03-30 NOTE — TELEPHONE ENCOUNTER
Refill Request       Last Seen: Last Seen Department: 2/9/2023  Last Seen by PCP: 2/9/2023  Last Written: 3/7/2023 60 with 0 refills     Next Appointment:   Future Appointments   Date Time Provider Sekou Busch   4/7/2023  1:15 PM MD Janiya Davis             Requested Prescriptions     Pending Prescriptions Disp Refills    metoprolol tartrate (LOPRESSOR) 25 MG tablet [Pharmacy Med Name: METOPROLOL TARTRATE 25 MG TAB] 60 tablet 0     Sig: TAKE 1 TABLET BY MOUTH TWICE A DAY

## 2023-04-05 ENCOUNTER — CARE COORDINATION (OUTPATIENT)
Dept: CARE COORDINATION | Age: 53
End: 2023-04-05

## 2023-04-05 NOTE — CARE COORDINATION
Ambulatory Care Coordination Note  2023    Patient Current Location:  Home: [place] [id number]  [address] [address] [place] [address]     ACM contacted the patient by telephone. Verified name and  with patient as identifiers. Provided introduction to self, and explanation of the ACM role. Challenges to be reviewed by the provider   Additional needs identified to be addressed with provider: Yes  ED Follow Up needed                Method of communication with provider: chart routing. ACM: Jackie Kerr RN    ACM outreach to patient to [facility]. Patient notes she has just arrived back from from a visit in [place]. Patient notes while traveling back to [place] she began to have chest pain and went to the ED in [place]. Patient notes all labs/test came back okay. She notes her BP was low, and BS was elevated at 222 she believes she states she had LOC and was taken by ambulance. She notes now that she is home, she continues to have chest pain on and off. Took aspirin and that helps for a little bit. Patient describes the pain as sharp, middle of her chest in location. Does not radiate. No sweating, palpations, headache, blurred vision, dizziness. ACM recommended ED d/t symptoms however patient declined. ACM explained the risk of not going to the ED to rule out cardiac or other complications from long flights. Patient verbalized understanding but again declined ED visit. ACM advised patient to call her cardiologist and she noted she will hang up and call them now. ACM will route note to PCP to have ED F/U scheduled.      Plan   Route note to PCP for ED F/U  F/U with patient tomorrow on symptoms and cardiology F/U        Offered patient enrollment in the Remote Patient Monitoring (RPM) program for in-home monitoring: Patient is not eligible for RPM program.    Lab Results       None            Care Coordination Interventions    Referral from Primary Care Provider: Yes  Suggested Interventions and Community

## 2023-04-06 ENCOUNTER — CARE COORDINATION (OUTPATIENT)
Dept: CARE COORDINATION | Age: 53
End: 2023-04-06

## 2023-04-06 NOTE — CARE COORDINATION
ACM attempted follow up on patient's symptoms she was having yesterday and to discuss any concerns. Patient did not answer and VM was left for patient to [facility]. Patient is scheduled with Cardio tomorrow and PCP on the 12th for an ED Follow up. ACM will attempt follow up at a later date.

## 2023-04-07 ENCOUNTER — OFFICE VISIT (OUTPATIENT)
Dept: CARDIOLOGY CLINIC | Age: 53
End: 2023-04-07

## 2023-04-07 ENCOUNTER — TELEPHONE (OUTPATIENT)
Dept: CARDIOLOGY CLINIC | Age: 53
End: 2023-04-07

## 2023-04-07 VITALS
SYSTOLIC BLOOD PRESSURE: 120 MMHG | HEIGHT: 61 IN | HEART RATE: 72 BPM | BODY MASS INDEX: 43.67 KG/M2 | DIASTOLIC BLOOD PRESSURE: 72 MMHG | WEIGHT: 231.31 LBS | OXYGEN SATURATION: 98 %

## 2023-04-07 DIAGNOSIS — R07.9 CHEST PAIN, UNSPECIFIED TYPE: ICD-10-CM

## 2023-04-07 DIAGNOSIS — E78.1 PURE HYPERTRIGLYCERIDEMIA: ICD-10-CM

## 2023-04-07 DIAGNOSIS — I20.8 OTHER FORMS OF ANGINA PECTORIS (HCC): Primary | ICD-10-CM

## 2023-04-07 DIAGNOSIS — I65.23 INTERNAL CAROTID ARTERY STENOSIS, BILATERAL: ICD-10-CM

## 2023-04-07 DIAGNOSIS — R55 SYNCOPE, UNSPECIFIED SYNCOPE TYPE: ICD-10-CM

## 2023-04-07 DIAGNOSIS — R00.0 TACHYCARDIA: ICD-10-CM

## 2023-04-07 DIAGNOSIS — G45.9 TIA (TRANSIENT ISCHEMIC ATTACK): ICD-10-CM

## 2023-04-07 DIAGNOSIS — I10 PRIMARY HYPERTENSION: ICD-10-CM

## 2023-04-07 NOTE — TELEPHONE ENCOUNTER
Monitor placed by Ama rosa [facility]  Length of monitor 2 WEEK  Monitor ordered by [facility]  Serial number [id number]  Kit ID [id number]  Activation successful prior to pt leaving office?  Yes

## 2023-04-07 NOTE — PROGRESS NOTES
Medications:  No current facility-administered medications for this visit. Allergies: Iv contrast [iodides] and Pineapple     Review of Systems:   A 14 point review of symptoms completed. Pertinent positives identified in the HPI, all other review of symptoms negative as below.       Objective   PHYSICAL EXAM:    Vitals:    04/07/23 1332   BP: 120/72   Pulse: 72   SpO2: 98%    Weight: 231 lb 5 oz (104.9 kg)         General Appearance:  Alert, cooperative, no distress, appears stated age   Head:  Normocephalic, without obvious abnormality, atraumatic   Eyes:  PERRL, conjunctiva/corneas clear   Nose: Nares normal, no drainage or sinus tenderness   Throat: Lips, mucosa, and tongue normal   Neck: Supple, symmetrical, trachea midline, no adenopathy, thyroid: not enlarged, symmetric, no tenderness/mass/nodules, no carotid bruit or JVD   Lungs:   Clear to auscultation bilaterally, respirations unlabored   Chest Wall:  No deformity or tenderness   Heart:  Regular rate and rhythm, S1, S2 normal, no murmur, rub or gallop   Abdomen:   Soft, non-tender, bowel sounds active all four quadrants,  no masses, no organomegaly   Extremities: Extremities normal, atraumatic, no cyanosis or edema   Pulses: 2+ and symmetric   Skin: Skin color, texture, turgor normal, no rashes or lesions   Pysch: Normal mood and affect   Neurologic: Normal gross motor and sensory exam.         Labs   CBC:   Lab Results   Component Value Date/Time    WBC 13.7 01/30/2023 07:40 PM    RBC 5.34 01/30/2023 07:40 PM    HGB 13.7 01/30/2023 07:40 PM    HCT 42.6 01/30/2023 07:40 PM    MCV 79.8 01/30/2023 07:40 PM    RDW 16.1 01/30/2023 07:40 PM     01/30/2023 07:40 PM     CMP:  Lab Results   Component Value Date/Time     01/30/2023 07:40 PM    K 3.3 01/30/2023 07:40 PM    K 4.3 01/26/2023 06:02 AM    CL 99 01/30/2023 07:40 PM    CO2 25 01/30/2023 07:40 PM    BUN 12 01/30/2023 07:40 PM    CREATININE 0.7 01/30/2023 07:40 PM    GFRAA >60 10/13/2022

## 2023-04-20 ENCOUNTER — CARE COORDINATION (OUTPATIENT)
Dept: CASE MANAGEMENT | Age: 53
End: 2023-04-20

## 2023-04-20 NOTE — CARE COORDINATION
Ambulatory Care Coordination Note  2023    [facility] contacted the patient by telephone. Verified name and  with patient as identifiers. Provided introduction to self, and explanation of the LPN CC role. Challenges to be reviewed by the provider   Additional needs identified to be addressed with provider: No  none               Method of communication with provider: none. ACM: Jose Lee LPN    Spoke with Sylvie Corona who reported that she is not really feeling to well. She stated that she is wearing a 14 day heart monitor which time is almost over but she had an episode of mild chest pain, dizziness, lightheadedness, and sob last night. She reported that she did note the episode with the heart monitor. She reported that she also had an episode yesterday of a great sharp cp, stated that it felt like she was hit by a donkey. She also logged it with her heart monitor. Patient reported that for the past day or so she has been feeling like she have been food poisoned or have a stomach bug. She reported diarrhea and abd pain. She stated that she took some Gas X and tylenol and it has taken the edge off. Writer advised that if it gets worse to please contact her doctor. Writer advised patient that yes she has a heart monitor on but if she gets chest pain that feel like a donkey hit her she needs to seek medical attention and patient v/u.   when should you call for help? Call 911 anytime you think you may need emergency care. For example, call if you have life-threatening  symptoms, such as: You have severe trouble breathing. (You can't talk at all.)  You have constant chest pain or pressure. You are severely dizzy or lightheaded. You are confused or can't think clearly. Your face and lips have a blue color. You pass out (lose consciousness) or are very hard to wake up.       Patient reported that she did not check BS this morning because she was running late

## 2023-04-24 ENCOUNTER — CARE COORDINATION (OUTPATIENT)
Dept: CARE COORDINATION | Age: 53
End: 2023-04-24

## 2023-04-24 NOTE — CARE COORDINATION
Patient contacted writer with holter monitor removal questions, she reports she was supposed to have monitor removed in 2 weeks, it was placed on 4/7, assisted pt with obtaining the correct number for her cardiology office.  Pt given contact information for Dr El Phillips

## 2023-04-25 ENCOUNTER — CARE COORDINATION (OUTPATIENT)
Dept: CASE MANAGEMENT | Age: 53
End: 2023-04-25

## 2023-04-25 NOTE — CARE COORDINATION
Remote Patient Monitoring Note      Date/Time:  2023 2:24 PM  Patient Current Location: [place] [address] contacted patient by telephone regarding yellow alert received for no weight entered in 2 days. Verified patients name and  as identifiers. Background: Enrolled in RPM for HTN and diabetes  Clinical Interventions:  Reminded pt to enter weight tomorrow morning. She agrees to do so. She asked about graduation. Forwarded to [email or web address] for consideration. Plan/Follow Up: Will continue to review, monitor and address alerts with follow up based on severity of symptoms and risk factors.

## 2023-04-27 ENCOUNTER — CARE COORDINATION (OUTPATIENT)
Dept: CARE COORDINATION | Age: 53
End: 2023-04-27

## 2023-04-27 NOTE — CARE COORDINATION
ACM called but patient did not answer. ACM left message for patient to call back and left call back number. Will follow up at a later date.    At follow up assess for needs  Patient ready for graduation

## 2023-05-03 ENCOUNTER — CARE COORDINATION (OUTPATIENT)
Dept: CARE COORDINATION | Age: 53
End: 2023-05-03

## 2023-05-03 NOTE — CARE COORDINATION
Pt has been monitoring for 78 days and has had stable vitals with little to no outreaches, she is eligible to graduate

## 2023-05-04 ENCOUNTER — CARE COORDINATION (OUTPATIENT)
Dept: CARE COORDINATION | Age: 53
End: 2023-05-04

## 2023-05-04 NOTE — CARE COORDINATION
Remote Patient Monitoring Note      Date/Time:  5/4/2023 3:53 PM  Patient Current Location: [facility]  LPN contacted patient by telephone regarding red alert received for glucose reading (352). Tried to call, no answer, left hipaa compliant VM    Background: Pt enrolled for DM, HTN       Plan/Follow Up: Will continue to review, monitor and address alerts with follow up based on severity of symptoms and risk factors.

## 2023-05-05 ENCOUNTER — CARE COORDINATION (OUTPATIENT)
Dept: CASE MANAGEMENT | Age: 53
End: 2023-05-05

## 2023-05-05 NOTE — CARE COORDINATION
Remote Patient Monitoring Note      Date/Time:  2023 12:10 PM  Patient Current Location: [place] [id number] contacted patient by telephone regarding red alert received for weight increase (5 lbs in 7 days). Verified patients name and  as identifiers. Background: Enrolled in RPM for HTN and diabetes  Clinical Interventions: Reviewed and followed up on alerts and treatments-   Education of patient/family/caregiver/guardian to support self-management-   Assessment and support for treatment adherence and medication management-     Pt states she weighed after eating breakfast this morning. Reviewed weighing consistently including same time each morning, before eating or drinking, after emptying bladder, in the same amount of clothes or no clothes. Pt verablized understanding and agrees to do this tomorrow. Denies SOB, CP, or palpitations. States she does have some swelling in her hands and feet which may be slightly more than usual. Reports taking all medications as prescribed. She agrees to call her doctor if she develops any new or worsening symptoms and continue to submit metrics consistently. ACM alerted. Plan/Follow Up: Will continue to review, monitor and address alerts with follow up based on severity of symptoms and risk factors.         Current Patient Metrics ---- Blood Pressure: 104/73, 65bpm Glucose: 146mg/dl Pulseox: 98%, 68bpm Survey: - Weight: 225.5lbs Note Created at: 2023 12:16 PM ET ---- Time-Spent: 7 minutes 0 seconds

## 2023-05-08 ENCOUNTER — CARE COORDINATION (OUTPATIENT)
Dept: CARE COORDINATION | Age: 53
End: 2023-05-08

## 2023-05-08 ENCOUNTER — TELEPHONE (OUTPATIENT)
Dept: CARDIOLOGY CLINIC | Age: 53
End: 2023-05-08

## 2023-05-08 NOTE — TELEPHONE ENCOUNTER
Called and spoke with pt. Relayed monitor results and recommendation made by SRJ. Pt verbalized understanding.

## 2023-05-08 NOTE — CARE COORDINATION
Remote Patient Monitoring Note      Date/Time:  2023 2:47 PM  Patient Current Location: [facility]  LPN contacted patient by telephone regarding red alert received for weight increase (5lb in 7 days). Verified patients name and  as identifiers. Background: Pt enrolled for HTN and DM    Clinical Interventions:  Spoke with patient, she reports she had on her pajamas while weighing in this morning, she did eat a small breakfast sandwich prior to weighing in. She reports mild edema to bilateral feet/ankles, mild JAY. Discussed lowering sodium content in diet and discussed watching for sodium amt in drinks such as sports drinks. She reports she quit drinking soda about a month ago and she isnt sure why her weight is going up. She does report headaches and dizziness off/on. Will send recent readings to PCP for review. Plan/Follow Up: Will continue to review, monitor and address alerts with follow up based on severity of symptoms and risk factors.

## 2023-05-09 ENCOUNTER — NURSE TRIAGE (OUTPATIENT)
Dept: OTHER | Facility: CLINIC | Age: 53
End: 2023-05-09

## 2023-05-09 ENCOUNTER — CARE COORDINATION (OUTPATIENT)
Dept: CARE COORDINATION | Age: 53
End: 2023-05-09

## 2023-05-09 NOTE — TELEPHONE ENCOUNTER
Location of patient: [facility]am [facility] call from [address] at [place] with [employer]. Subjective: Caller states \"Diarrhea, Emesis, abdominal pain\"     Current Symptoms:  abd pain- lower L quadrant   Hx hernia   Diarrhea 4 episodes today  Emesis x 2 today  HA  States has decreased fluid intake   Nausea   Lightheadedness   States last night had an episode of weakness when walking   Denies dry mouth   Denies blood in stool or vomit     Onset: a few days ago; gradual    Associated Symptoms: reduced activity, reduced appetite, reduced fluid intake, diarrhea    Pain Severity: 6/10; sharp, cramping; intermittent- abd pain  6/10; dull; constant- HA     Temperature: denies fever     What has been tried: hydration     LMP: NA Pregnant: NA    Recommended disposition: See PCP within 24 Hours  Referred to [facility] or walk in clinic due to pt being unestablished     Care advice provided, patient verbalizes understanding; denies any other questions or concerns; instructed to call back for any new or worsening symptoms. Patient/caller agrees to proceed to nearest [facility]   Pt already is scheduled for a new pt appt   Attention Provider: Thank you for allowing me to participate in the care of your patient. The patient was connected to triage in response to information provided to the ECC/PSC. Please do not respond through this encounter as the response is not directed to a shared pool.       Reason for Disposition   [1] MODERATE diarrhea (e.g., 4-6 times / day more than normal) AND [2] present > 48 hours (2 days)    Protocols used: Diarrhea-ADULT-

## 2023-05-09 NOTE — CARE COORDINATION
Remote Patient Monitoring Note      Date/Time:  5/9/2023 12:56 PM  Patient Current Location: [facility]  LPN noted  red alert received for weight increase (5lb in 7 days). Background: Pt enrolled for HTN and DM    Clinical Interventions: Wt alert noted, spoke with pt yesterday and sent to PCP, weight is actually down 2lbs from yesterday, other vitals are WNL      Plan/Follow Up: Will continue to review, monitor and address alerts with follow up based on severity of symptoms and risk factors.

## 2023-05-10 ENCOUNTER — CARE COORDINATION (OUTPATIENT)
Dept: CARE COORDINATION | Age: 53
End: 2023-05-10

## 2023-05-10 NOTE — CARE COORDINATION
Remote Patient Monitoring Note      Date/Time:  5/10/2023 12:53 PM  Patient Current Location: [facility]  LPN noted red alert received for weight increase (5lb in 7 days). Background: Pt enrolled for HTN and DM management    Clinical Interventions:  Weight alert noted- wt is still fluctuating between 1-3lbs the last few days, PCP aware, awaiting recommendation. Other vitals are WNL. 2 outreaches to pt regarding weight she had no sx other than mild swelling in bilat feet/hands      Plan/Follow Up: Will continue to review, monitor and address alerts with follow up based on severity of symptoms and risk factors.

## 2023-05-12 ENCOUNTER — CARE COORDINATION (OUTPATIENT)
Dept: CARE COORDINATION | Age: 53
End: 2023-05-12

## 2023-05-15 ENCOUNTER — OFFICE VISIT (OUTPATIENT)
Dept: PRIMARY CARE CLINIC | Age: 53
End: 2023-05-15
Payer: MEDICAID

## 2023-05-15 ENCOUNTER — TELEPHONE (OUTPATIENT)
Dept: CARDIOLOGY CLINIC | Age: 53
End: 2023-05-15

## 2023-05-15 VITALS
HEIGHT: 61 IN | WEIGHT: 230 LBS | RESPIRATION RATE: 16 BRPM | TEMPERATURE: 97.3 F | HEART RATE: 82 BPM | SYSTOLIC BLOOD PRESSURE: 102 MMHG | DIASTOLIC BLOOD PRESSURE: 60 MMHG | OXYGEN SATURATION: 97 % | BODY MASS INDEX: 43.43 KG/M2

## 2023-05-15 DIAGNOSIS — R11.0 NAUSEA: ICD-10-CM

## 2023-05-15 DIAGNOSIS — E11.9 TYPE 2 DIABETES MELLITUS WITHOUT COMPLICATION, WITH LONG-TERM CURRENT USE OF INSULIN (HCC): ICD-10-CM

## 2023-05-15 DIAGNOSIS — M79.89 SWELLING OF BOTH HANDS: Primary | ICD-10-CM

## 2023-05-15 DIAGNOSIS — Z79.4 TYPE 2 DIABETES MELLITUS WITHOUT COMPLICATION, WITH LONG-TERM CURRENT USE OF INSULIN (HCC): ICD-10-CM

## 2023-05-15 PROCEDURE — 3074F SYST BP LT 130 MM HG: CPT

## 2023-05-15 PROCEDURE — 3078F DIAST BP <80 MM HG: CPT

## 2023-05-15 PROCEDURE — 99214 OFFICE O/P EST MOD 30 MIN: CPT

## 2023-05-15 PROCEDURE — 3052F HG A1C>EQUAL 8.0%<EQUAL 9.0%: CPT

## 2023-05-15 RX ORDER — LOSARTAN POTASSIUM 25 MG/1
12.5 TABLET ORAL DAILY
Qty: 90 TABLET | Refills: 1
Start: 2023-05-15

## 2023-05-15 ASSESSMENT — ANXIETY QUESTIONNAIRES
6. BECOMING EASILY ANNOYED OR IRRITABLE: 2
7. FEELING AFRAID AS IF SOMETHING AWFUL MIGHT HAPPEN: 0
GAD7 TOTAL SCORE: 5
IF YOU CHECKED OFF ANY PROBLEMS ON THIS QUESTIONNAIRE, HOW DIFFICULT HAVE THESE PROBLEMS MADE IT FOR YOU TO DO YOUR WORK, TAKE CARE OF THINGS AT HOME, OR GET ALONG WITH OTHER PEOPLE: SOMEWHAT DIFFICULT
5. BEING SO RESTLESS THAT IT IS HARD TO SIT STILL: 0
3. WORRYING TOO MUCH ABOUT DIFFERENT THINGS: 1
4. TROUBLE RELAXING: 1
2. NOT BEING ABLE TO STOP OR CONTROL WORRYING: 0
1. FEELING NERVOUS, ANXIOUS, OR ON EDGE: 1

## 2023-05-15 ASSESSMENT — PATIENT HEALTH QUESTIONNAIRE - PHQ9
SUM OF ALL RESPONSES TO PHQ QUESTIONS 1-9: 5
10. IF YOU CHECKED OFF ANY PROBLEMS, HOW DIFFICULT HAVE THESE PROBLEMS MADE IT FOR YOU TO DO YOUR WORK, TAKE CARE OF THINGS AT HOME, OR GET ALONG WITH OTHER PEOPLE: 1
SUM OF ALL RESPONSES TO PHQ QUESTIONS 1-9: 5
1. LITTLE INTEREST OR PLEASURE IN DOING THINGS: 0
5. POOR APPETITE OR OVEREATING: 2
4. FEELING TIRED OR HAVING LITTLE ENERGY: 1
SUM OF ALL RESPONSES TO PHQ QUESTIONS 1-9: 5
7. TROUBLE CONCENTRATING ON THINGS, SUCH AS READING THE NEWSPAPER OR WATCHING TELEVISION: 1
SUM OF ALL RESPONSES TO PHQ9 QUESTIONS 1 & 2: 0
6. FEELING BAD ABOUT YOURSELF - OR THAT YOU ARE A FAILURE OR HAVE LET YOURSELF OR YOUR FAMILY DOWN: 0
9. THOUGHTS THAT YOU WOULD BE BETTER OFF DEAD, OR OF HURTING YOURSELF: 0
8. MOVING OR SPEAKING SO SLOWLY THAT OTHER PEOPLE COULD HAVE NOTICED. OR THE OPPOSITE, BEING SO FIGETY OR RESTLESS THAT YOU HAVE BEEN MOVING AROUND A LOT MORE THAN USUAL: 0
3. TROUBLE FALLING OR STAYING ASLEEP: 1
2. FEELING DOWN, DEPRESSED OR HOPELESS: 0
SUM OF ALL RESPONSES TO PHQ QUESTIONS 1-9: 5

## 2023-05-15 NOTE — TELEPHONE ENCOUNTER
Pt sts someone called her to move up her appt, based on monitor results. Only message is from [place] LPN stating she spoke with pt gave recommendation and pt VU. Please advise.  Pt has appt 7/7/23 with Np.

## 2023-05-15 NOTE — TELEPHONE ENCOUNTER
Lets check the event monitor report, there are likely some recs from that which may need to be implemented and lets get that scanned in. In case that paper copy has some recs about appts, lets implement those. Thank you.

## 2023-05-15 NOTE — PROGRESS NOTES
scale, lipitor 40 mg daily  - Refilled Ozempic for 0.5 mg SQ weekly to uptitrate back  - Continue checking fasting blood sugars daily  - Annual exams: urine proteinuria (normal in Nov 2022), foot exam (normal Nov 2022), eye exam (due). -     blood glucose test strips (ASCENSIA AUTODISC VI;ONE TOUCH ULTRA TEST VI) strip; TEST BLOOD SUGARS ONCE A DAY EVERY MORNING  -     Semaglutide, 1 MG/DOSE, 2 MG/1.5ML SOPN; Inject 0.5 mg into the skin once a week        Health Maintenance Due:  Health Maintenance Due   Topic Date Due    COVID-19 Vaccine (1) Never done    Pneumococcal 0-64 years Vaccine (1 - PCV) Never done    Diabetic retinal exam  Never done    Hepatitis B vaccine (1 of 3 - Risk 3-dose series) Never done    Cervical cancer screen  Never done    Colorectal Cancer Screen  Never done        Health care decision maker:  <72years old      Health Maintenance: (USPSTF Recommendations)  (F) Breast Cancer Screen: (40-49 (C), 50-74 biennial screening mammogram (B)):   (F) Cervical Cancer Screen: (21-29 q3yr cytology alone; 30-65 q3yr cytology alone, q5yr with hrHPV alone, or q5yr cytology+hrHPV (A)):  CRC/Colonoscopy Screening: (adults 45-49 (B), 50-75 (A)):  Lung Ca Screening: Annual LDCT (+smoker age 49-80, smoked within 15 years, total of 20 pack yr history (B)):   DEXA Screen: (women >65 and older, <65 if at risk/postmenopausal (B)): HIV Screen: (16-65 yr old, and all pregnant patients (A)): Hep C Screen: (18-79 yr old (B)):  HCC Screen: (all pts with cirrhosis and high risk Hep B (US q6 mo)):  Immunizations:       RTC:  Return in about 1 month (around 6/15/2023) for swelling and nausea f/u. EMR Dragon/transcription disclaimer:  Much of this encounter note is electronic transcription/translation of spoken language to printed texts. The electronic translation of spoken language may be erroneous, or at times, nonsensical words or phrases may be inadvertently transcribed.   Although I have reviewed the note for

## 2023-05-16 ASSESSMENT — ENCOUNTER SYMPTOMS
SHORTNESS OF BREATH: 0
VOMITING: 1
BACK PAIN: 0
DIARRHEA: 1
COUGH: 0
RHINORRHEA: 0
ABDOMINAL PAIN: 0
WHEEZING: 0
NAUSEA: 1
SINUS PRESSURE: 0
SORE THROAT: 0

## 2023-05-18 NOTE — TELEPHONE ENCOUNTER
Monitor results were given to pt on 5/8/23.     Per [facility]: NSR, PSVT noted; Continue current meds for this

## 2023-05-18 NOTE — TELEPHONE ENCOUNTER
Pt has follow up with NP DINESH that correlates from her OV with Dr. El Phillips on 4/7/23  PLAN:  2 week cardiac monitor-syncope, likely vagal event, consider loop recorder if recurrent/persistent sx. Decrease losartan to 12.5 mg, cut pill in half  Continue to monitor BP and heart rate at home. Keep a log  and report back readings to the office  Follow up with PCP-for possible noncardiac causes of cp  5. Low to intermediate cardiovascular risk. May proceed with colonoscopy  6.  Follow up with nurse practitioner in 3 months

## 2023-05-18 NOTE — TELEPHONE ENCOUNTER
Called and spoke with pt. Confirmed to her that her follow up OV in July correlates from her OV in April with [facility] and not due to monitor results. She verbalized understanding.

## 2023-05-19 ENCOUNTER — CARE COORDINATION (OUTPATIENT)
Dept: CARE COORDINATION | Age: 53
End: 2023-05-19

## 2023-05-19 VITALS
SYSTOLIC BLOOD PRESSURE: 124 MMHG | WEIGHT: 228.5 LBS | BODY MASS INDEX: 43.17 KG/M2 | OXYGEN SATURATION: 95 % | DIASTOLIC BLOOD PRESSURE: 77 MMHG | HEART RATE: 87 BPM

## (undated) DEVICE — GAUZE,SPONGE,4"X4",8PLY,STRL,LF,10/TRAY: Brand: MEDLINE

## (undated) DEVICE — PAD,ABDOMINAL,8"X10",ST,LF: Brand: MEDLINE

## (undated) DEVICE — GAUZE,PACKING STRIP,IODOFORM,1/2"X5YD,ST: Brand: CURAD

## (undated) DEVICE — BANDAGE,GAUZE,4.5"X4.1YD,STERILE,LF: Brand: MEDLINE

## (undated) DEVICE — Device

## (undated) DEVICE — GLOVE,SURG,SENSICARE SLT,LF,PF,7: Brand: MEDLINE

## (undated) DEVICE — TRAY PREP DRY W/ PREM GLV 2 APPL 6 SPNG 2 UNDPD 1 OVERWRAP